# Patient Record
Sex: MALE | Employment: OTHER | URBAN - METROPOLITAN AREA
[De-identification: names, ages, dates, MRNs, and addresses within clinical notes are randomized per-mention and may not be internally consistent; named-entity substitution may affect disease eponyms.]

---

## 2017-01-26 RX ORDER — PANTOPRAZOLE SODIUM 40 MG/1
40 TABLET, DELAYED RELEASE ORAL DAILY
Qty: 30 TAB | Refills: 5 | Status: SHIPPED | OUTPATIENT
Start: 2017-01-26 | End: 2017-01-27 | Stop reason: SDUPTHER

## 2017-01-26 NOTE — TELEPHONE ENCOUNTER
Requested Prescriptions     Pending Prescriptions Disp Refills    pantoprazole (PROTONIX) 40 mg tablet 30 Tab 5     Sig: Take 1 Tab by mouth daily.

## 2017-01-27 RX ORDER — PANTOPRAZOLE SODIUM 40 MG/1
TABLET, DELAYED RELEASE ORAL
Qty: 30 TAB | Refills: 3 | Status: SHIPPED | OUTPATIENT
Start: 2017-01-27 | End: 2017-05-30 | Stop reason: SDUPTHER

## 2017-01-31 RX ORDER — ALPRAZOLAM 0.25 MG/1
TABLET ORAL
Qty: 60 TAB | Refills: 2 | Status: SHIPPED | OUTPATIENT
Start: 2017-01-31 | End: 2017-02-07 | Stop reason: SDUPTHER

## 2017-02-01 RX ORDER — ALPRAZOLAM 0.25 MG/1
TABLET ORAL
Qty: 60 TAB | Refills: 2 | Status: CANCELLED | OUTPATIENT
Start: 2017-02-01

## 2017-02-01 NOTE — TELEPHONE ENCOUNTER
Requested Prescriptions     Pending Prescriptions Disp Refills    ALPRAZolam (XANAX) 0.25 mg tablet 60 Tab 2

## 2017-02-07 RX ORDER — ALPRAZOLAM 0.25 MG/1
TABLET ORAL
Qty: 60 TAB | Refills: 2 | Status: SHIPPED | OUTPATIENT
Start: 2017-02-07 | End: 2017-09-23 | Stop reason: SDUPTHER

## 2017-02-13 RX ORDER — MEMANTINE HYDROCHLORIDE 5 MG/1
TABLET ORAL
Qty: 60 TAB | Refills: 5 | Status: SHIPPED | OUTPATIENT
Start: 2017-02-13 | End: 2017-10-30 | Stop reason: SDUPTHER

## 2017-03-29 RX ORDER — PROPRANOLOL HYDROCHLORIDE 10 MG/1
TABLET ORAL
Qty: 60 TAB | Refills: 5 | Status: SHIPPED | OUTPATIENT
Start: 2017-03-29 | End: 2017-09-24 | Stop reason: SDUPTHER

## 2017-03-29 NOTE — TELEPHONE ENCOUNTER
Requested Prescriptions     Pending Prescriptions Disp Refills    propranolol (INDERAL) 10 mg tablet 60 Tab 5

## 2017-05-30 RX ORDER — PANTOPRAZOLE SODIUM 40 MG/1
TABLET, DELAYED RELEASE ORAL
Qty: 30 TAB | Refills: 3 | Status: SHIPPED | OUTPATIENT
Start: 2017-05-30 | End: 2018-02-26 | Stop reason: SDUPTHER

## 2017-06-26 RX ORDER — RISPERIDONE 0.5 MG/1
TABLET, FILM COATED ORAL
Qty: 30 TAB | Refills: 3 | Status: SHIPPED | OUTPATIENT
Start: 2017-06-26 | End: 2017-12-23 | Stop reason: SDUPTHER

## 2017-09-20 ENCOUNTER — TELEPHONE (OUTPATIENT)
Dept: INTERNAL MEDICINE CLINIC | Age: 82
End: 2017-09-20

## 2017-09-20 NOTE — TELEPHONE ENCOUNTER
Ms Hetal Candelaria contacted office and received authorization information and expressed understanding

## 2017-09-20 NOTE — TELEPHONE ENCOUNTER
Prior Auth request received from Pharmacy for Protonix. PA initiated on [email or web address]. Approval received from insurance. Pharmacy faxed insurance decision. I have attempted to contact this patient by phone with the following results: left message to return my call on answering machine.

## 2017-09-23 RX ORDER — ALPRAZOLAM 0.25 MG/1
TABLET ORAL
Qty: 60 TAB | Refills: 2 | Status: SHIPPED | OUTPATIENT
Start: 2017-09-23 | End: 2018-03-20 | Stop reason: SDUPTHER

## 2017-09-24 RX ORDER — PROPRANOLOL HYDROCHLORIDE 10 MG/1
TABLET ORAL
Qty: 60 TAB | Refills: 5 | Status: SHIPPED | OUTPATIENT
Start: 2017-09-24 | End: 2019-02-27 | Stop reason: SDUPTHER

## 2017-10-17 ENCOUNTER — CLINICAL SUPPORT (OUTPATIENT)
Dept: INTERNAL MEDICINE CLINIC | Age: 82
End: 2017-10-17

## 2017-10-17 DIAGNOSIS — Z23 ENCOUNTER FOR IMMUNIZATION: ICD-10-CM

## 2017-10-17 NOTE — PROGRESS NOTES
Patient presented to office for flu shot. Allergies noted. Patient well and consenting to injection. Vaccine given intramuscular in right deltoid. Patient tolerated injection well and left office ambulatory.

## 2017-10-31 RX ORDER — MEMANTINE HYDROCHLORIDE 5 MG/1
TABLET ORAL
Qty: 60 TAB | Refills: 5 | Status: SHIPPED | OUTPATIENT
Start: 2017-10-31 | End: 2018-05-03 | Stop reason: SDUPTHER

## 2017-12-26 RX ORDER — RISPERIDONE 0.5 MG/1
TABLET, FILM COATED ORAL
Qty: 30 TAB | Refills: 3 | Status: SHIPPED | OUTPATIENT
Start: 2017-12-26 | End: 2018-04-03 | Stop reason: SDUPTHER

## 2018-02-13 RX ORDER — PROPRANOLOL HYDROCHLORIDE 10 MG/1
TABLET ORAL
Qty: 60 TAB | Refills: 5 | Status: SHIPPED | OUTPATIENT
Start: 2018-02-13 | End: 2018-07-27 | Stop reason: SDUPTHER

## 2018-02-13 NOTE — TELEPHONE ENCOUNTER
Requested Prescriptions     Pending Prescriptions Disp Refills    propranolol (INDERAL) 10 mg tablet 60 Tab 5     Sig: take 1 tablet by mouth twice a day

## 2018-02-26 RX ORDER — PANTOPRAZOLE SODIUM 40 MG/1
TABLET, DELAYED RELEASE ORAL
Qty: 30 TAB | Refills: 3 | Status: SHIPPED | OUTPATIENT
Start: 2018-02-26 | End: 2018-06-26 | Stop reason: SDUPTHER

## 2018-03-20 DIAGNOSIS — F41.1 ANXIETY STATES: Primary | ICD-10-CM

## 2018-03-22 RX ORDER — ALPRAZOLAM 0.25 MG/1
TABLET ORAL
Qty: 60 TAB | Refills: 2 | Status: SHIPPED | OUTPATIENT
Start: 2018-03-22 | End: 2018-07-04 | Stop reason: SDUPTHER

## 2018-03-23 ENCOUNTER — TELEPHONE (OUTPATIENT)
Dept: INTERNAL MEDICINE CLINIC | Age: 83
End: 2018-03-23

## 2018-04-03 RX ORDER — RISPERIDONE 0.5 MG/1
TABLET, FILM COATED ORAL
Qty: 30 TAB | Refills: 3 | Status: SHIPPED | OUTPATIENT
Start: 2018-04-03 | End: 2018-08-28 | Stop reason: SDUPTHER

## 2018-05-03 RX ORDER — MEMANTINE HYDROCHLORIDE 5 MG/1
TABLET ORAL
Qty: 60 TAB | Refills: 5 | Status: SHIPPED | OUTPATIENT
Start: 2018-05-03 | End: 2018-11-06 | Stop reason: SDUPTHER

## 2018-06-26 RX ORDER — PANTOPRAZOLE SODIUM 40 MG/1
TABLET, DELAYED RELEASE ORAL
Qty: 30 TAB | Refills: 3 | Status: SHIPPED | OUTPATIENT
Start: 2018-06-26 | End: 2018-11-07 | Stop reason: SDUPTHER

## 2018-07-04 DIAGNOSIS — F41.1 ANXIETY STATES: ICD-10-CM

## 2018-07-05 RX ORDER — ALPRAZOLAM 0.25 MG/1
TABLET ORAL
Qty: 60 TAB | Refills: 2 | Status: SHIPPED | OUTPATIENT
Start: 2018-07-05 | End: 2018-10-10 | Stop reason: SDUPTHER

## 2018-07-27 RX ORDER — PROPRANOLOL HYDROCHLORIDE 10 MG/1
TABLET ORAL
Qty: 60 TAB | Refills: 5 | Status: SHIPPED | OUTPATIENT
Start: 2018-07-27 | End: 2019-08-30 | Stop reason: SDUPTHER

## 2018-08-28 RX ORDER — RISPERIDONE 0.5 MG/1
TABLET, FILM COATED ORAL
Qty: 30 TAB | Refills: 3 | Status: SHIPPED | OUTPATIENT
Start: 2018-08-28 | End: 2019-04-22 | Stop reason: SDUPTHER

## 2018-08-28 RX ORDER — RISPERIDONE 0.5 MG/1
TABLET, FILM COATED ORAL
Qty: 30 TAB | Refills: 3 | Status: SHIPPED | OUTPATIENT
Start: 2018-08-28 | End: 2018-10-03 | Stop reason: SDUPTHER

## 2018-08-28 NOTE — TELEPHONE ENCOUNTER
Requested Prescriptions     Pending Prescriptions Disp Refills    risperiDONE (RISPERDAL) 0.5 mg tablet 30 Tab 3

## 2018-09-16 RX ORDER — PROPRANOLOL HYDROCHLORIDE 10 MG/1
TABLET ORAL
Qty: 60 TAB | Refills: 5 | Status: SHIPPED | OUTPATIENT
Start: 2018-09-16 | End: 2018-10-03 | Stop reason: SDUPTHER

## 2018-10-03 ENCOUNTER — HOSPITAL ENCOUNTER (OUTPATIENT)
Dept: LAB | Age: 83
Discharge: HOME | End: 2018-10-03
Payer: MEDICARE

## 2018-10-03 ENCOUNTER — OFFICE VISIT (OUTPATIENT)
Dept: INTERNAL MEDICINE CLINIC | Age: 83
End: 2018-10-03

## 2018-10-03 ENCOUNTER — HOME HEALTH ADMISSION (OUTPATIENT)
Dept: HOME HEALTH SERVICES | Facility: HOME HEALTH | Age: 83
End: 2018-10-03
Payer: MEDICARE

## 2018-10-03 VITALS
DIASTOLIC BLOOD PRESSURE: 70 MMHG | TEMPERATURE: 98.4 F | OXYGEN SATURATION: 99 % | SYSTOLIC BLOOD PRESSURE: 140 MMHG | HEART RATE: 73 BPM | HEIGHT: 70 IN

## 2018-10-03 DIAGNOSIS — R23.1 PALE: ICD-10-CM

## 2018-10-03 DIAGNOSIS — Z23 ENCOUNTER FOR IMMUNIZATION: ICD-10-CM

## 2018-10-03 DIAGNOSIS — R41.3 MEMORY LOSS: ICD-10-CM

## 2018-10-03 DIAGNOSIS — M15.9 PRIMARY OSTEOARTHRITIS INVOLVING MULTIPLE JOINTS: ICD-10-CM

## 2018-10-03 DIAGNOSIS — G20 PARKINSON DISEASE (HCC): Primary | ICD-10-CM

## 2018-10-03 LAB
ALBUMIN SERPL-MCNC: 3.5 G/DL (ref 3.4–5)
ALBUMIN/GLOB SERPL: 0.8 {RATIO} (ref 0.8–1.7)
ALP SERPL-CCNC: 112 U/L (ref 45–117)
ALT SERPL-CCNC: 19 U/L (ref 16–61)
ANION GAP SERPL CALC-SCNC: 10 MMOL/L (ref 3–18)
AST SERPL-CCNC: 21 U/L (ref 15–37)
BASOPHILS # BLD: 0 K/UL (ref 0–0.1)
BASOPHILS NFR BLD: 1 % (ref 0–2)
BILIRUB SERPL-MCNC: 0.6 MG/DL (ref 0.2–1)
BUN SERPL-MCNC: 27 MG/DL (ref 7–18)
BUN/CREAT SERPL: 22 (ref 12–20)
CALCIUM SERPL-MCNC: 8.4 MG/DL (ref 8.5–10.1)
CHLORIDE SERPL-SCNC: 105 MMOL/L (ref 100–108)
CO2 SERPL-SCNC: 25 MMOL/L (ref 21–32)
CREAT SERPL-MCNC: 1.23 MG/DL (ref 0.6–1.3)
DIFFERENTIAL METHOD BLD: ABNORMAL
EOSINOPHIL # BLD: 0.3 K/UL (ref 0–0.4)
EOSINOPHIL NFR BLD: 5 % (ref 0–5)
ERYTHROCYTE [DISTWIDTH] IN BLOOD BY AUTOMATED COUNT: 14 % (ref 11.6–14.5)
GLOBULIN SER CALC-MCNC: 4.2 G/DL (ref 2–4)
GLUCOSE SERPL-MCNC: 107 MG/DL (ref 74–99)
HCT VFR BLD AUTO: 36.4 % (ref 36–48)
HGB BLD-MCNC: 11.7 G/DL (ref 13–16)
LYMPHOCYTES # BLD: 1.4 K/UL (ref 0.9–3.6)
LYMPHOCYTES NFR BLD: 23 % (ref 21–52)
MCH RBC QN AUTO: 30.2 PG (ref 24–34)
MCHC RBC AUTO-ENTMCNC: 32.1 G/DL (ref 31–37)
MCV RBC AUTO: 94.1 FL (ref 74–97)
MONOCYTES # BLD: 0.5 K/UL (ref 0.05–1.2)
MONOCYTES NFR BLD: 8 % (ref 3–10)
NEUTS SEG # BLD: 3.8 K/UL (ref 1.8–8)
NEUTS SEG NFR BLD: 63 % (ref 40–73)
PLATELET # BLD AUTO: 234 K/UL (ref 135–420)
PMV BLD AUTO: 11.9 FL (ref 9.2–11.8)
POTASSIUM SERPL-SCNC: 4.5 MMOL/L (ref 3.5–5.5)
PROT SERPL-MCNC: 7.7 G/DL (ref 6.4–8.2)
RBC # BLD AUTO: 3.87 M/UL (ref 4.7–5.5)
SODIUM SERPL-SCNC: 140 MMOL/L (ref 136–145)
WBC # BLD AUTO: 5.9 K/UL (ref 4.6–13.2)

## 2018-10-03 PROCEDURE — 36415 COLL VENOUS BLD VENIPUNCTURE: CPT | Performed by: INTERNAL MEDICINE

## 2018-10-03 PROCEDURE — 83921 ORGANIC ACID SINGLE QUANT: CPT | Performed by: INTERNAL MEDICINE

## 2018-10-03 PROCEDURE — 85025 COMPLETE CBC W/AUTO DIFF WBC: CPT | Performed by: INTERNAL MEDICINE

## 2018-10-03 PROCEDURE — 80053 COMPREHEN METABOLIC PANEL: CPT | Performed by: INTERNAL MEDICINE

## 2018-10-03 NOTE — PROGRESS NOTES
Savana Mott presents today for   Chief Complaint   Patient presents with    Well Male       Savana Mott preferred language for health care discussion is english/other. Is someone accompanying this pt? Yes wife    Is the patient using any DME equipment during [address]? Yes wheelchair    Depression Screening:  PHQ over the last two weeks 10/3/2018   Little interest or pleasure in doing things Not at all   Feeling down, depressed, irritable, or hopeless Not at all   Total Score PHQ 2 0       Learning Assessment:  No flowsheet data found. Abuse Screening:  Abuse Screening Questionnaire 10/3/2018   Do you ever feel afraid of your partner? N   Are you in a relationship with someone who physically or mentally threatens you? N   Is it safe for you to go home? Y       Fall Risk  No flowsheet data found. Health Maintenance reviewed and discussed and ordered per Provider. Health Maintenance Due   Topic Date Due    DTaP/Tdap/Td series (1 - Tdap) 09/16/1943    Shingrix Vaccine Age 50> (1 of 2) 09/16/1972    GLAUCOMA SCREENING Q2Y  09/16/1987    MEDICARE YEARLY EXAM  03/14/2018    Influenza Age 9 to Adult  08/01/2018   . Savana Mix is updated on all     Pt currently taking Antiplatelet therapy? no    Coordination of Care:  1. Have you been to the ER, urgent care clinic since your last visit? no Hospitalized since your last visit? no    2. Have you seen or consulted any other health care providers outside of the [address] [place] [address] since your last visit? no Include any pap smears or colon screening. no          Patient presented to office for Influenza 0.5 ml injection. Allergies noted. Patient well and consenting to injection. Injection given intramuscular in left deltoid. Patient tolerated Influenza injection well and left office ambulatory.

## 2018-10-04 NOTE — PROGRESS NOTES
The patient presents to the office today with the chief complaint of weakness    HPI    The patient was brought to the office today by his long time  for a flu shot. Upon arrival she stated that she was concerned about the patient's health and that he needed medical attention. The patient was promptly brought back to an exam room for me to evaluate. The patient has memoy loss but he conversed with me today. His complaint was of weakness and difficulty walking. The patient's  stated that he is unable to ambulate without her assistance. She is elderly and now she finds that it is quite difficult to aid him. He has not showered in several months because she cannot help help to the bath. The patient has been prescribed medications for memory loss and Parkinson's Disease. The patient's  states that the patient is taking some medications but she is unsure as to which medications. The patient continues with stiffness in multiple joints - worse in the hands. Review of Systems   Respiratory: Negative for shortness of breath. Cardiovascular: Negative for chest pain and leg swelling. Musculoskeletal: Positive for joint pain. Neurological: Positive for weakness.        Allergies   Allergen Reactions    Sulfa (Sulfonamide Antibiotics) Other (comments)     Dizziness/ passed out       Current Outpatient Prescriptions   Medication Sig Dispense Refill    risperiDONE (RISPERDAL) 0.5 mg tablet take 1 tablet by mouth every evening 30 Tab 3    propranolol (INDERAL) 10 mg tablet take 1 tablet by mouth twice a day 60 Tab 5    ALPRAZolam (XANAX) 0.25 mg tablet take 1 tablet by mouth twice a day if needed 60 Tab 2    pantoprazole (PROTONIX) 40 mg tablet take 1 tablet by mouth once daily 30 Tab 3    memantine (NAMENDA) 5 mg tablet take 1 tablet by mouth twice a day 60 Tab 5    propranolol (INDERAL) 10 mg tablet take 1 tablet by mouth twice a day 60 Tab 5    pantoprazole (PROTONIX) 40 mg tablet take 1 tablet by mouth once daily 30 Tab 3    traZODone (DESYREL) 100 mg tablet take 1/2 to 1 tablet by mouth at bedtime 30 Tab 0    ascorbic acid (VITAMIN C) 250 mg tablet Take 1 tablet by mouth daily. 30 tablet 0    psyllium husk-aspartame (METAMUCIL FIBER) 3.4 gram pwpk packet Take 1 packet by mouth daily (after dinner). 27 packet 0       Past Medical History:   Diagnosis Date    Allergy     Anxiety states     Arthritis     Benign essential hypertension     Depressive disorder     Diverticulitis     Dizziness and giddiness     Elevated prostate specific antigen (PSA)     Glaucoma     Gross hematuria     Hypertrophy of prostate with urinary obstruction and other lower urinary tract symptoms (LUTS)     Nocturia     Osteoarthrosis involving multiple sites     Tremor        Past Surgical History:   Procedure Laterality Date    HX APPENDECTOMY      HX HERNIA REPAIR      HX OTHER SURGICAL      rectal fissure surgery    HX OTHER SURGICAL Bilateral     eye surgery       Social History     Social History    Marital status: SINGLE     Spouse name: N/A    Number of children: N/A    Years of education: N/A     Occupational History    Not on file. Social History Main Topics    Smoking status: Former Smoker    Smokeless tobacco: Never Used    Alcohol use Yes      Comment: occasional    Drug use: Not on file    Sexual activity: Not on file     Other Topics Concern    Not on file     Social History Narrative       Patient does not have an advanced directive on file    Visit Vitals    /70 (BP 1 Location: Left arm, BP Patient Position: Sitting)    Pulse 73    Temp 98.4 °F (36.9 °C) (Tympanic)    Ht 5' 10\" (1.778 m)    SpO2 99%       Physical Exam   Constitutional:   Patient appears frail, pale and somewhat ill kept   Neck: Carotid bruit is not present. Cardiovascular: Normal rate and regular rhythm. Exam reveals no gallop. No murmur heard.   Pulmonary/Chest: He has no wheezes. He has no rales. Abdominal: Soft. He exhibits no distension. There is no tenderness. Musculoskeletal: He exhibits no edema. Osteoarthritis of both hands and knees   Neurological:   Slight pill rolling tremor of hands  Bradykinesia with cogwheel rigidity   Skin:   Dry and crusting with thickened dirt deposits over his scalp           Hospital Outpatient Visit on 10/03/2018   Component Date Value Ref Range Status    WBC 10/03/2018 5.9  4.6 - 13.2 K/uL Final    RBC 10/03/2018 3.87* 4.70 - 5.50 M/uL Final    HGB 10/03/2018 11.7* 13.0 - 16.0 g/dL Final    HCT 10/03/2018 36.4  36.0 - 48.0 % Final    MCV 10/03/2018 94.1  74.0 - 97.0 FL Final    MCH 10/03/2018 30.2  24.0 - 34.0 PG Final    MCHC 10/03/2018 32.1  31.0 - 37.0 g/dL Final    RDW 10/03/2018 14.0  11.6 - 14.5 % Final    PLATELET 50/32/2568 983  135 - 420 K/uL Final    MPV 10/03/2018 11.9* 9.2 - 11.8 FL Final    NEUTROPHILS 10/03/2018 63  40 - 73 % Final    LYMPHOCYTES 10/03/2018 23  21 - 52 % Final    MONOCYTES 10/03/2018 8  3 - 10 % Final    EOSINOPHILS 10/03/2018 5  0 - 5 % Final    BASOPHILS 10/03/2018 1  0 - 2 % Final    ABS. NEUTROPHILS 10/03/2018 3.8  1.8 - 8.0 K/UL Final    ABS. LYMPHOCYTES 10/03/2018 1.4  0.9 - 3.6 K/UL Final    ABS. MONOCYTES 10/03/2018 0.5  0.05 - 1.2 K/UL Final    ABS. EOSINOPHILS 10/03/2018 0.3  0.0 - 0.4 K/UL Final    ABS.  BASOPHILS 10/03/2018 0.0  0.0 - 0.1 K/UL Final    DF 10/03/2018 AUTOMATED    Final       .  Results for orders placed or performed during the hospital encounter of 10/03/18   CBC WITH AUTOMATED DIFF   Result Value Ref Range    WBC 5.9 4.6 - 13.2 K/uL    RBC 3.87 (L) 4.70 - 5.50 M/uL    HGB 11.7 (L) 13.0 - 16.0 g/dL    HCT 36.4 36.0 - 48.0 %    MCV 94.1 74.0 - 97.0 FL    MCH 30.2 24.0 - 34.0 PG    MCHC 32.1 31.0 - 37.0 g/dL    RDW 14.0 11.6 - 14.5 %    PLATELET 767 085 - 734 K/uL    MPV 11.9 (H) 9.2 - 11.8 FL    NEUTROPHILS 63 40 - 73 %    LYMPHOCYTES 23 21 - 52 %    MONOCYTES 8 3 - 10 %    EOSINOPHILS 5 0 - 5 %    BASOPHILS 1 0 - 2 %    ABS. NEUTROPHILS 3.8 1.8 - 8.0 K/UL    ABS. LYMPHOCYTES 1.4 0.9 - 3.6 K/UL    ABS. MONOCYTES 0.5 0.05 - 1.2 K/UL    ABS. EOSINOPHILS 0.3 0.0 - 0.4 K/UL    ABS. BASOPHILS 0.0 0.0 - 0.1 K/UL    DF AUTOMATED         Assessment / Plan      ICD-10-CM ICD-9-CM    1. Parkinson disease ([facility] [address] G20 332.0 REFERRAL TO HOME HEALTH   2. Primary osteoarthritis involving multiple joints M15.0 715.09 CBC WITH AUTOMATED DIFF      METABOLIC PANEL, COMPREHENSIVE      REFERRAL TO HOME HEALTH   3. Pale R23.1 782.61 CBC WITH AUTOMATED DIFF      REFERRAL TO HOME HEALTH   4. Memory loss R41.3 780.93 METHYLMALONIC ACID      REFERRAL TO HOME HEALTH   5. Encounter for immunization Z23 V03.89 ADMIN INFLUENZA VIRUS VAC      INFLUENZA VACCINE INACTIVATED (IIV), SUBUNIT, ADJUVANTED, IM      REFERRAL TO [address] [place] [address] drawn  Flu shot given  Home Health was contacted for nursing care to aid with med education and compliance, physical therapy, occupational therapy, bath aid, social service  Medications will be reassessed once the home nurse can review with the patient    Follow-up Disposition:  Return in about 6 months (around 4/3/2019). I asked the patient and his  for any questions and I answered their questions.   They state that they understand the treatment plan and agree with the treatment plan

## 2018-10-05 ENCOUNTER — HOME CARE VISIT (OUTPATIENT)
Dept: SCHEDULING | Facility: HOME HEALTH | Age: 83
End: 2018-10-05
Payer: MEDICARE

## 2018-10-05 VITALS
TEMPERATURE: 98.8 F | RESPIRATION RATE: 16 BRPM | DIASTOLIC BLOOD PRESSURE: 76 MMHG | OXYGEN SATURATION: 98 % | SYSTOLIC BLOOD PRESSURE: 159 MMHG | HEART RATE: 58 BPM

## 2018-10-05 PROCEDURE — 400013 HH SOC

## 2018-10-05 PROCEDURE — 3331090001 HH PPS REVENUE CREDIT

## 2018-10-05 PROCEDURE — G0299 HHS/HOSPICE OF RN EA 15 MIN: HCPCS

## 2018-10-05 PROCEDURE — 3331090002 HH PPS REVENUE DEBIT

## 2018-10-06 PROCEDURE — 3331090001 HH PPS REVENUE CREDIT

## 2018-10-06 PROCEDURE — 3331090002 HH PPS REVENUE DEBIT

## 2018-10-07 PROCEDURE — 3331090002 HH PPS REVENUE DEBIT

## 2018-10-07 PROCEDURE — 3331090001 HH PPS REVENUE CREDIT

## 2018-10-08 ENCOUNTER — HOME CARE VISIT (OUTPATIENT)
Dept: HOME HEALTH SERVICES | Facility: HOME HEALTH | Age: 83
End: 2018-10-08
Payer: MEDICARE

## 2018-10-08 LAB
Lab: NORMAL
METHYLMALONATE SERPL-SCNC: 203 NMOL/L (ref 0–378)

## 2018-10-08 PROCEDURE — 3331090002 HH PPS REVENUE DEBIT

## 2018-10-08 PROCEDURE — 3331090001 HH PPS REVENUE CREDIT

## 2018-10-09 ENCOUNTER — HOME CARE VISIT (OUTPATIENT)
Dept: SCHEDULING | Facility: HOME HEALTH | Age: 83
End: 2018-10-09
Payer: MEDICARE

## 2018-10-09 PROCEDURE — 3331090001 HH PPS REVENUE CREDIT

## 2018-10-09 PROCEDURE — G0300 HHS/HOSPICE OF LPN EA 15 MIN: HCPCS

## 2018-10-09 PROCEDURE — 3331090002 HH PPS REVENUE DEBIT

## 2018-10-10 ENCOUNTER — HOME CARE VISIT (OUTPATIENT)
Dept: SCHEDULING | Facility: HOME HEALTH | Age: 83
End: 2018-10-10
Payer: MEDICARE

## 2018-10-10 VITALS
OXYGEN SATURATION: 98 % | TEMPERATURE: 99.3 F | HEART RATE: 66 BPM | DIASTOLIC BLOOD PRESSURE: 82 MMHG | SYSTOLIC BLOOD PRESSURE: 154 MMHG

## 2018-10-10 DIAGNOSIS — F41.1 ANXIETY STATES: ICD-10-CM

## 2018-10-10 PROCEDURE — 3331090002 HH PPS REVENUE DEBIT

## 2018-10-10 PROCEDURE — G0151 HHCP-SERV OF PT,EA 15 MIN: HCPCS

## 2018-10-10 PROCEDURE — 3331090001 HH PPS REVENUE CREDIT

## 2018-10-10 RX ORDER — ALPRAZOLAM 0.25 MG/1
TABLET ORAL
Qty: 60 TAB | Refills: 2 | Status: SHIPPED | OUTPATIENT
Start: 2018-10-10 | End: 2019-02-19 | Stop reason: SDUPTHER

## 2018-10-11 ENCOUNTER — HOME CARE VISIT (OUTPATIENT)
Dept: SCHEDULING | Facility: HOME HEALTH | Age: 83
End: 2018-10-11
Payer: MEDICARE

## 2018-10-11 ENCOUNTER — HOME CARE VISIT (OUTPATIENT)
Dept: HOME HEALTH SERVICES | Facility: HOME HEALTH | Age: 83
End: 2018-10-11
Payer: MEDICARE

## 2018-10-11 PROCEDURE — G0155 HHCP-SVS OF CSW,EA 15 MIN: HCPCS

## 2018-10-11 PROCEDURE — 3331090001 HH PPS REVENUE CREDIT

## 2018-10-11 PROCEDURE — G0300 HHS/HOSPICE OF LPN EA 15 MIN: HCPCS

## 2018-10-11 PROCEDURE — 3331090002 HH PPS REVENUE DEBIT

## 2018-10-12 PROCEDURE — 3331090001 HH PPS REVENUE CREDIT

## 2018-10-12 PROCEDURE — 3331090002 HH PPS REVENUE DEBIT

## 2018-10-13 ENCOUNTER — HOME CARE VISIT (OUTPATIENT)
Dept: SCHEDULING | Facility: HOME HEALTH | Age: 83
End: 2018-10-13
Payer: MEDICARE

## 2018-10-13 VITALS
SYSTOLIC BLOOD PRESSURE: 100 MMHG | HEART RATE: 63 BPM | OXYGEN SATURATION: 98 % | DIASTOLIC BLOOD PRESSURE: 74 MMHG | TEMPERATURE: 97.8 F

## 2018-10-13 PROCEDURE — 3331090002 HH PPS REVENUE DEBIT

## 2018-10-13 PROCEDURE — G0157 HHC PT ASSISTANT EA 15: HCPCS

## 2018-10-13 PROCEDURE — 3331090001 HH PPS REVENUE CREDIT

## 2018-10-14 PROCEDURE — 3331090001 HH PPS REVENUE CREDIT

## 2018-10-14 PROCEDURE — 3331090002 HH PPS REVENUE DEBIT

## 2018-10-15 ENCOUNTER — HOME CARE VISIT (OUTPATIENT)
Dept: HOME HEALTH SERVICES | Facility: HOME HEALTH | Age: 83
End: 2018-10-15
Payer: MEDICARE

## 2018-10-15 ENCOUNTER — HOME CARE VISIT (OUTPATIENT)
Dept: SCHEDULING | Facility: HOME HEALTH | Age: 83
End: 2018-10-15
Payer: MEDICARE

## 2018-10-15 VITALS
DIASTOLIC BLOOD PRESSURE: 65 MMHG | HEART RATE: 63 BPM | RESPIRATION RATE: 16 BRPM | HEART RATE: 67 BPM | SYSTOLIC BLOOD PRESSURE: 142 MMHG | DIASTOLIC BLOOD PRESSURE: 77 MMHG | OXYGEN SATURATION: 98 % | RESPIRATION RATE: 18 BRPM | SYSTOLIC BLOOD PRESSURE: 146 MMHG | OXYGEN SATURATION: 98 %

## 2018-10-15 PROCEDURE — G0157 HHC PT ASSISTANT EA 15: HCPCS

## 2018-10-15 PROCEDURE — 3331090002 HH PPS REVENUE DEBIT

## 2018-10-15 PROCEDURE — 3331090001 HH PPS REVENUE CREDIT

## 2018-10-16 ENCOUNTER — HOME CARE VISIT (OUTPATIENT)
Dept: HOME HEALTH SERVICES | Facility: HOME HEALTH | Age: 83
End: 2018-10-16
Payer: MEDICARE

## 2018-10-16 VITALS
RESPIRATION RATE: 18 BRPM | TEMPERATURE: 97.5 F | OXYGEN SATURATION: 97 % | HEART RATE: 61 BPM | SYSTOLIC BLOOD PRESSURE: 119 MMHG | DIASTOLIC BLOOD PRESSURE: 80 MMHG

## 2018-10-16 PROCEDURE — 3331090001 HH PPS REVENUE CREDIT

## 2018-10-16 PROCEDURE — 3331090002 HH PPS REVENUE DEBIT

## 2018-10-17 ENCOUNTER — HOME CARE VISIT (OUTPATIENT)
Dept: SCHEDULING | Facility: HOME HEALTH | Age: 83
End: 2018-10-17
Payer: MEDICARE

## 2018-10-17 VITALS
RESPIRATION RATE: 18 BRPM | HEART RATE: 74 BPM | TEMPERATURE: 97.7 F | OXYGEN SATURATION: 98 % | SYSTOLIC BLOOD PRESSURE: 119 MMHG | DIASTOLIC BLOOD PRESSURE: 78 MMHG

## 2018-10-17 PROCEDURE — 3331090001 HH PPS REVENUE CREDIT

## 2018-10-17 PROCEDURE — 3331090002 HH PPS REVENUE DEBIT

## 2018-10-17 PROCEDURE — G0157 HHC PT ASSISTANT EA 15: HCPCS

## 2018-10-18 ENCOUNTER — HOME CARE VISIT (OUTPATIENT)
Dept: SCHEDULING | Facility: HOME HEALTH | Age: 83
End: 2018-10-18
Payer: MEDICARE

## 2018-10-18 PROCEDURE — G0157 HHC PT ASSISTANT EA 15: HCPCS

## 2018-10-18 PROCEDURE — 3331090002 HH PPS REVENUE DEBIT

## 2018-10-18 PROCEDURE — 3331090001 HH PPS REVENUE CREDIT

## 2018-10-19 PROCEDURE — 3331090001 HH PPS REVENUE CREDIT

## 2018-10-19 PROCEDURE — 3331090002 HH PPS REVENUE DEBIT

## 2018-10-20 PROCEDURE — 3331090002 HH PPS REVENUE DEBIT

## 2018-10-20 PROCEDURE — 3331090001 HH PPS REVENUE CREDIT

## 2018-10-21 PROCEDURE — 3331090002 HH PPS REVENUE DEBIT

## 2018-10-21 PROCEDURE — 3331090001 HH PPS REVENUE CREDIT

## 2018-10-22 ENCOUNTER — HOME CARE VISIT (OUTPATIENT)
Dept: SCHEDULING | Facility: HOME HEALTH | Age: 83
End: 2018-10-22
Payer: MEDICARE

## 2018-10-22 VITALS
HEART RATE: 60 BPM | DIASTOLIC BLOOD PRESSURE: 60 MMHG | TEMPERATURE: 97.9 F | OXYGEN SATURATION: 98 % | RESPIRATION RATE: 16 BRPM | RESPIRATION RATE: 17 BRPM | HEART RATE: 98 BPM | SYSTOLIC BLOOD PRESSURE: 124 MMHG | OXYGEN SATURATION: 98 % | DIASTOLIC BLOOD PRESSURE: 64 MMHG | SYSTOLIC BLOOD PRESSURE: 116 MMHG | TEMPERATURE: 98.5 F

## 2018-10-22 PROCEDURE — 3331090001 HH PPS REVENUE CREDIT

## 2018-10-22 PROCEDURE — 3331090002 HH PPS REVENUE DEBIT

## 2018-10-22 PROCEDURE — G0157 HHC PT ASSISTANT EA 15: HCPCS

## 2018-10-23 PROCEDURE — 3331090002 HH PPS REVENUE DEBIT

## 2018-10-23 PROCEDURE — 3331090001 HH PPS REVENUE CREDIT

## 2018-10-24 ENCOUNTER — HOME CARE VISIT (OUTPATIENT)
Dept: SCHEDULING | Facility: HOME HEALTH | Age: 83
End: 2018-10-24
Payer: MEDICARE

## 2018-10-24 PROCEDURE — 3331090001 HH PPS REVENUE CREDIT

## 2018-10-24 PROCEDURE — 3331090002 HH PPS REVENUE DEBIT

## 2018-10-24 PROCEDURE — G0151 HHCP-SERV OF PT,EA 15 MIN: HCPCS

## 2018-10-25 VITALS
TEMPERATURE: 99.3 F | HEART RATE: 61 BPM | DIASTOLIC BLOOD PRESSURE: 55 MMHG | SYSTOLIC BLOOD PRESSURE: 147 MMHG | OXYGEN SATURATION: 98 %

## 2018-10-25 PROCEDURE — 3331090002 HH PPS REVENUE DEBIT

## 2018-10-25 PROCEDURE — 3331090001 HH PPS REVENUE CREDIT

## 2018-10-26 ENCOUNTER — HOME CARE VISIT (OUTPATIENT)
Dept: SCHEDULING | Facility: HOME HEALTH | Age: 83
End: 2018-10-26
Payer: MEDICARE

## 2018-10-26 PROCEDURE — 3331090002 HH PPS REVENUE DEBIT

## 2018-10-26 PROCEDURE — 3331090001 HH PPS REVENUE CREDIT

## 2018-10-26 PROCEDURE — G0157 HHC PT ASSISTANT EA 15: HCPCS

## 2018-10-27 PROCEDURE — 3331090002 HH PPS REVENUE DEBIT

## 2018-10-27 PROCEDURE — 3331090001 HH PPS REVENUE CREDIT

## 2018-10-28 VITALS
RESPIRATION RATE: 18 BRPM | TEMPERATURE: 97 F | OXYGEN SATURATION: 97 % | DIASTOLIC BLOOD PRESSURE: 70 MMHG | HEART RATE: 70 BPM | SYSTOLIC BLOOD PRESSURE: 126 MMHG

## 2018-10-28 PROCEDURE — 3331090002 HH PPS REVENUE DEBIT

## 2018-10-28 PROCEDURE — 3331090001 HH PPS REVENUE CREDIT

## 2018-10-29 ENCOUNTER — HOME CARE VISIT (OUTPATIENT)
Dept: SCHEDULING | Facility: HOME HEALTH | Age: 83
End: 2018-10-29
Payer: MEDICARE

## 2018-10-29 PROCEDURE — G0157 HHC PT ASSISTANT EA 15: HCPCS

## 2018-10-29 PROCEDURE — 3331090001 HH PPS REVENUE CREDIT

## 2018-10-29 PROCEDURE — 3331090002 HH PPS REVENUE DEBIT

## 2018-10-30 PROCEDURE — 3331090001 HH PPS REVENUE CREDIT

## 2018-10-30 PROCEDURE — 3331090002 HH PPS REVENUE DEBIT

## 2018-10-31 ENCOUNTER — HOME CARE VISIT (OUTPATIENT)
Dept: SCHEDULING | Facility: HOME HEALTH | Age: 83
End: 2018-10-31
Payer: MEDICARE

## 2018-10-31 VITALS
SYSTOLIC BLOOD PRESSURE: 131 MMHG | OXYGEN SATURATION: 98 % | HEART RATE: 65 BPM | RESPIRATION RATE: 18 BRPM | DIASTOLIC BLOOD PRESSURE: 81 MMHG | TEMPERATURE: 98.1 F

## 2018-10-31 VITALS
DIASTOLIC BLOOD PRESSURE: 75 MMHG | HEART RATE: 67 BPM | RESPIRATION RATE: 17 BRPM | TEMPERATURE: 98.3 F | SYSTOLIC BLOOD PRESSURE: 124 MMHG | OXYGEN SATURATION: 97 %

## 2018-10-31 PROCEDURE — 3331090001 HH PPS REVENUE CREDIT

## 2018-10-31 PROCEDURE — 3331090002 HH PPS REVENUE DEBIT

## 2018-10-31 PROCEDURE — G0157 HHC PT ASSISTANT EA 15: HCPCS

## 2018-11-01 PROCEDURE — 3331090002 HH PPS REVENUE DEBIT

## 2018-11-01 PROCEDURE — 3331090001 HH PPS REVENUE CREDIT

## 2018-11-02 ENCOUNTER — HOME CARE VISIT (OUTPATIENT)
Dept: SCHEDULING | Facility: HOME HEALTH | Age: 83
End: 2018-11-02
Payer: MEDICARE

## 2018-11-02 PROCEDURE — 3331090001 HH PPS REVENUE CREDIT

## 2018-11-02 PROCEDURE — G0157 HHC PT ASSISTANT EA 15: HCPCS

## 2018-11-02 PROCEDURE — 3331090002 HH PPS REVENUE DEBIT

## 2018-11-03 PROCEDURE — 3331090002 HH PPS REVENUE DEBIT

## 2018-11-03 PROCEDURE — 3331090001 HH PPS REVENUE CREDIT

## 2018-11-04 PROCEDURE — 3331090002 HH PPS REVENUE DEBIT

## 2018-11-04 PROCEDURE — 3331090001 HH PPS REVENUE CREDIT

## 2018-11-05 PROCEDURE — 3331090001 HH PPS REVENUE CREDIT

## 2018-11-05 PROCEDURE — 3331090002 HH PPS REVENUE DEBIT

## 2018-11-06 VITALS
OXYGEN SATURATION: 98 % | HEART RATE: 60 BPM | SYSTOLIC BLOOD PRESSURE: 120 MMHG | RESPIRATION RATE: 19 BRPM | DIASTOLIC BLOOD PRESSURE: 61 MMHG | TEMPERATURE: 97.8 F

## 2018-11-06 PROCEDURE — 3331090001 HH PPS REVENUE CREDIT

## 2018-11-06 PROCEDURE — 3331090002 HH PPS REVENUE DEBIT

## 2018-11-07 ENCOUNTER — HOME CARE VISIT (OUTPATIENT)
Dept: SCHEDULING | Facility: HOME HEALTH | Age: 83
End: 2018-11-07
Payer: MEDICARE

## 2018-11-07 PROCEDURE — G0157 HHC PT ASSISTANT EA 15: HCPCS

## 2018-11-07 PROCEDURE — 3331090002 HH PPS REVENUE DEBIT

## 2018-11-07 PROCEDURE — 3331090001 HH PPS REVENUE CREDIT

## 2018-11-07 RX ORDER — PANTOPRAZOLE SODIUM 40 MG/1
TABLET, DELAYED RELEASE ORAL
Qty: 30 TAB | Refills: 3 | Status: SHIPPED | OUTPATIENT
Start: 2018-11-07 | End: 2019-04-11 | Stop reason: SDUPTHER

## 2018-11-07 RX ORDER — MEMANTINE HYDROCHLORIDE 5 MG/1
TABLET ORAL
Qty: 60 TAB | Refills: 5 | Status: SHIPPED | OUTPATIENT
Start: 2018-11-07 | End: 2019-01-23 | Stop reason: SDUPTHER

## 2018-11-07 RX ORDER — PANTOPRAZOLE SODIUM 40 MG/1
TABLET, DELAYED RELEASE ORAL
Qty: 30 TAB | Refills: 3 | Status: SHIPPED | OUTPATIENT
Start: 2018-11-07 | End: 2020-05-15 | Stop reason: SDUPTHER

## 2018-11-07 RX ORDER — MEMANTINE HYDROCHLORIDE 5 MG/1
TABLET ORAL
Qty: 60 TAB | Refills: 5 | Status: SHIPPED | OUTPATIENT
Start: 2018-11-07 | End: 2018-11-07 | Stop reason: SDUPTHER

## 2018-11-07 NOTE — TELEPHONE ENCOUNTER
Requested Prescriptions     Pending Prescriptions Disp Refills    memantine (NAMENDA) 5 mg tablet 60 Tab 5    pantoprazole (PROTONIX) 40 mg tablet 30 Tab 3     Sig: take 1 tablet by mouth once daily

## 2018-11-08 ENCOUNTER — HOME CARE VISIT (OUTPATIENT)
Dept: SCHEDULING | Facility: HOME HEALTH | Age: 83
End: 2018-11-08
Payer: MEDICARE

## 2018-11-08 PROCEDURE — G0151 HHCP-SERV OF PT,EA 15 MIN: HCPCS

## 2018-11-08 PROCEDURE — 3331090002 HH PPS REVENUE DEBIT

## 2018-11-08 PROCEDURE — 3331090001 HH PPS REVENUE CREDIT

## 2018-11-08 PROCEDURE — 3331090003 HH PPS REVENUE ADJ

## 2018-11-09 VITALS
OXYGEN SATURATION: 97 % | RESPIRATION RATE: 19 BRPM | TEMPERATURE: 97.8 F | SYSTOLIC BLOOD PRESSURE: 124 MMHG | HEART RATE: 67 BPM | DIASTOLIC BLOOD PRESSURE: 80 MMHG

## 2018-11-09 VITALS
DIASTOLIC BLOOD PRESSURE: 61 MMHG | OXYGEN SATURATION: 96 % | SYSTOLIC BLOOD PRESSURE: 144 MMHG | TEMPERATURE: 99.2 F | HEART RATE: 57 BPM

## 2018-11-09 PROCEDURE — 3331090002 HH PPS REVENUE DEBIT

## 2018-11-09 PROCEDURE — 3331090001 HH PPS REVENUE CREDIT

## 2018-11-10 PROCEDURE — 3331090001 HH PPS REVENUE CREDIT

## 2018-11-10 PROCEDURE — 3331090002 HH PPS REVENUE DEBIT

## 2018-11-11 PROCEDURE — 3331090002 HH PPS REVENUE DEBIT

## 2018-11-11 PROCEDURE — 3331090001 HH PPS REVENUE CREDIT

## 2019-01-23 RX ORDER — MEMANTINE HYDROCHLORIDE 5 MG/1
TABLET ORAL
Qty: 60 TAB | Refills: 5 | Status: SHIPPED | OUTPATIENT
Start: 2019-01-23 | End: 2021-01-01

## 2019-01-23 NOTE — TELEPHONE ENCOUNTER
Requested Prescriptions     Pending Prescriptions Disp Refills    memantine (NAMENDA) 5 mg tablet 60 Tab 5     Sig: take 1 tablet by mouth twice a day

## 2019-02-19 DIAGNOSIS — F41.1 ANXIETY STATES: ICD-10-CM

## 2019-02-19 RX ORDER — ALPRAZOLAM 0.25 MG/1
TABLET ORAL
Qty: 60 TAB | Refills: 2 | Status: SHIPPED | OUTPATIENT
Start: 2019-02-19 | End: 2019-02-26 | Stop reason: SDUPTHER

## 2019-02-26 ENCOUNTER — TELEPHONE (OUTPATIENT)
Dept: INTERNAL MEDICINE CLINIC | Age: 84
End: 2019-02-26

## 2019-02-26 DIAGNOSIS — F41.1 ANXIETY STATES: ICD-10-CM

## 2019-02-26 RX ORDER — ALPRAZOLAM 0.25 MG/1
TABLET ORAL
Qty: 60 TAB | Refills: 2 | Status: SHIPPED | OUTPATIENT
Start: 2019-02-26 | End: 2019-05-28 | Stop reason: SDUPTHER

## 2019-02-26 NOTE — TELEPHONE ENCOUNTER
[facility] called and stated that the Propranolol 10 mg is on back order. They can do 20 mg and patient can cut in half. Please call in to pharmacy or send in new prescription to [facility].

## 2019-02-27 RX ORDER — PROPRANOLOL HYDROCHLORIDE 20 MG/1
TABLET ORAL
Qty: 30 TAB | Refills: 0 | Status: SHIPPED | OUTPATIENT
Start: 2019-02-27 | End: 2020-05-16 | Stop reason: DRUGHIGH

## 2019-02-27 NOTE — TELEPHONE ENCOUNTER
Prescription called to pharmacy--xanax--  [id number] - [address] [place] [address] Drive, [facility] (Pharmacy) [phone]

## 2019-04-11 RX ORDER — PANTOPRAZOLE SODIUM 40 MG/1
TABLET, DELAYED RELEASE ORAL
Qty: 30 TAB | Refills: 3 | Status: SHIPPED | OUTPATIENT
Start: 2019-04-11 | End: 2019-08-30 | Stop reason: SDUPTHER

## 2019-04-22 RX ORDER — RISPERIDONE 0.5 MG/1
TABLET, FILM COATED ORAL
Qty: 30 TAB | Refills: 3 | Status: SHIPPED | OUTPATIENT
Start: 2019-04-22 | End: 2019-08-30 | Stop reason: SDUPTHER

## 2019-05-28 DIAGNOSIS — F41.1 ANXIETY STATES: ICD-10-CM

## 2019-05-29 RX ORDER — ALPRAZOLAM 0.25 MG/1
TABLET ORAL
Qty: 60 TAB | Refills: 2 | Status: SHIPPED | OUTPATIENT
Start: 2019-05-29 | End: 2019-05-31 | Stop reason: SDUPTHER

## 2019-05-31 DIAGNOSIS — F41.1 ANXIETY STATES: ICD-10-CM

## 2019-05-31 RX ORDER — ALPRAZOLAM 0.25 MG/1
TABLET ORAL
Qty: 60 TAB | Refills: 2 | Status: SHIPPED | OUTPATIENT
Start: 2019-05-31 | End: 2019-06-05 | Stop reason: SDUPTHER

## 2019-06-03 DIAGNOSIS — F41.1 ANXIETY STATES: ICD-10-CM

## 2019-06-05 DIAGNOSIS — F41.1 ANXIETY STATES: ICD-10-CM

## 2019-06-05 RX ORDER — ALPRAZOLAM 0.25 MG/1
TABLET ORAL
Qty: 60 TAB | Refills: 2 | Status: CANCELLED | OUTPATIENT
Start: 2019-06-05

## 2019-06-05 RX ORDER — ALPRAZOLAM 0.25 MG/1
TABLET ORAL
Qty: 60 TAB | Refills: 2 | Status: SHIPPED | OUTPATIENT
Start: 2019-06-05 | End: 2021-01-01

## 2019-08-30 RX ORDER — PANTOPRAZOLE SODIUM 40 MG/1
TABLET, DELAYED RELEASE ORAL
Qty: 30 TAB | Refills: 3 | Status: SHIPPED | OUTPATIENT
Start: 2019-08-30 | End: 2019-09-21 | Stop reason: SDUPTHER

## 2019-08-30 RX ORDER — PROPRANOLOL HYDROCHLORIDE 10 MG/1
TABLET ORAL
Qty: 60 TAB | Refills: 5 | Status: SHIPPED | OUTPATIENT
Start: 2019-08-30 | End: 2020-05-15 | Stop reason: SDUPTHER

## 2019-08-30 RX ORDER — RISPERIDONE 0.5 MG/1
TABLET, FILM COATED ORAL
Qty: 30 TAB | Refills: 3 | Status: SHIPPED | OUTPATIENT
Start: 2019-08-30 | End: 2020-05-16

## 2019-09-22 RX ORDER — PANTOPRAZOLE SODIUM 40 MG/1
TABLET, DELAYED RELEASE ORAL
Qty: 120 TAB | Refills: 0 | Status: SHIPPED | OUTPATIENT
Start: 2019-09-22 | End: 2020-05-16

## 2019-12-30 ENCOUNTER — DOCUMENTATION ONLY (OUTPATIENT)
Dept: FAMILY MEDICINE CLINIC | Facility: CLINIC | Age: 84
End: 2019-12-30

## 2019-12-31 ENCOUNTER — DOCUMENTATION ONLY (OUTPATIENT)
Dept: FAMILY MEDICINE CLINIC | Facility: CLINIC | Age: 84
End: 2019-12-31

## 2019-12-31 NOTE — PROGRESS NOTES
Approval letter for the patients pantoprazole was received and faxed to the [facility] aid and to scan. Good from 11/30/19 to 12/29/2020.

## 2020-05-12 NOTE — PROGRESS NOTES
Consent: Nany Thomson, who was seen by synchronous (real-time) audio-video technology, and/or his healthcare decision maker, is aware that this patient-initiated, Telehealth encounter on 5/13/2020 is a billable service, with coverage as determined by his insurance carrier. He is aware that he may receive a bill and has provided verbal consent to proceed: Yes. The patient was at his residence and I was at the [place] office,and family members participated in the service. The encounter diagnosis was Anxiety states. MDM The patient presents with {DIFF DX:86758} The patient presents with {DIFF DX:63070} {vaavitamincdeaids (Optional):68160} 1. Anxiety states *** ASSESSMENT and PLAN 
{ASSESSMENT/PLAN:75737} Health Maintenance Due Topic Date Due  
 DTaP/Tdap/Td series (1 - Tdap) 09/16/1943  Shingrix Vaccine Age 50> (1 of 2) 09/16/1972  GLAUCOMA SCREENING Q2Y  09/16/1987  Medicare Yearly Exam  03/14/2018 {Time-based coding - will disappear if no selections made (Optional):57143::\"I spent at least 15 minutes with this established patient, and >50% of the time was spent counseling and/or coordinating care regarding ***\":0} 
712 Subjective:  
Nany Thomson is a 80 y.o. male who was seen for *** 
 
Prior to Admission medications Medication Sig Start Date End Date Taking?  Authorizing Provider  
pantoprazole (PROTONIX) 40 mg tablet take 1 tablet by mouth once daily 9/22/19   Adwoa Richards MD  
propranolol (INDERAL) 10 mg tablet take 1 tablet by mouth twice a day 8/30/19   Adwoa Richards MD  
risperiDONE (RISPERDAL) 0.5 mg tablet take 1 tablet by mouth every evening 8/30/19   Adwoa Richards MD  
ALPRAZolam [facility]) 0.25 mg tablet take 1 tablet twice a day if needed for STRESS 6/5/19   Adwoa Richards MD  
propranolol (INDERAL) 20 mg tablet take 1/2 (half) tablet by mouth twice a day 2/27/19   Adwoa Richards MD  
 memantine (NAMENDA) 5 mg tablet take 1 tablet by mouth twice a day 1/23/19   Jes Salgado MD  
pantoprazole (PROTONIX) 40 mg tablet take 1 tablet by mouth once daily 11/7/18   Jes Salgado MD  
ubidecarenone (NATURAL CO-Q10 PO) Take 120 mg by mouth daily. Provider, Historical  
psyllium seed, with dextrose, (FIBER PO) Take 1 Tab by mouth daily. Provider, Historical  
traZODone (DESYREL) 100 mg tablet take 1/2 to 1 tablet by mouth at bedtime 1/3/16   Jes Salgado MD  
ascorbic acid (VITAMIN C) 250 mg tablet Take 1 tablet by mouth daily. 10/2/14   Laura Kelly MD  
psyllium husk-aspartame (METAMUCIL FIBER) 3.4 gram pwpk packet Take 1 packet by mouth daily (after dinner). 10/1/14   Laura Kelly MD  
 
Allergies Allergen Reactions  Sulfa (Sulfonamide Antibiotics) Other (comments) Dizziness/ passed out  
 
has Hypertrophy of prostate with urinary obstruction and other lower urinary tract symptoms (LUTS), Elevated prostate specific antigen (PSA), Tremor, Dizziness and giddiness, Benign essential hypertension, Anxiety states, Osteoarthrosis involving multiple sites, Depressive disorder, and Memory loss on their problem list. 
Past Surgical History:  
Procedure Laterality Date  HX APPENDECTOMY  HX HERNIA REPAIR    
 HX OTHER SURGICAL    
 rectal fissure surgery  HX OTHER SURGICAL Bilateral   
 eye surgery Raissa Billing {History SmartLink choices - disappears if left unselected (Optional):38573} ROS Objective: There were no vitals taken for this visit. General: alert, cooperative, no distress Mental  status: normal mood, behavior, speech, dress, motor activity, and thought processes, able to follow commands HENT: NCAT Neck: no visualized mass Musculoskeletal:   
Resp: no respiratory distress Neuro: no gross deficits Skin: no discoloration or lesions of concern on visible areas Psychiatric: normal affect, consistent with stated mood, no evidence of hallucinations Additional exam findings:  
Results for orders placed or performed during the hospital encounter of 10/03/18 CBC WITH AUTOMATED DIFF Result Value Ref Range WBC 5.9 4.6 - 13.2 K/uL  
 RBC 3.87 (L) 4.70 - 5.50 M/uL  
 HGB 11.7 (L) 13.0 - 16.0 g/dL HCT 36.4 36.0 - 48.0 % MCV 94.1 74.0 - 97.0 FL  
 MCH 30.2 24.0 - 34.0 PG  
 MCHC 32.1 31.0 - 37.0 g/dL  
 RDW 14.0 11.6 - 14.5 % PLATELET 150 692 - 463 K/uL MPV 11.9 (H) 9.2 - 11.8 FL  
 NEUTROPHILS 63 40 - 73 % LYMPHOCYTES 23 21 - 52 % MONOCYTES 8 3 - 10 % EOSINOPHILS 5 0 - 5 % BASOPHILS 1 0 - 2 %  
 ABS. NEUTROPHILS 3.8 1.8 - 8.0 K/UL  
 ABS. LYMPHOCYTES 1.4 0.9 - 3.6 K/UL  
 ABS. MONOCYTES 0.5 0.05 - 1.2 K/UL  
 ABS. EOSINOPHILS 0.3 0.0 - 0.4 K/UL  
 ABS. BASOPHILS 0.0 0.0 - 0.1 K/UL  
 DF AUTOMATED METABOLIC PANEL, COMPREHENSIVE Result Value Ref Range Sodium 140 136 - 145 mmol/L Potassium 4.5 3.5 - 5.5 mmol/L Chloride 105 100 - 108 mmol/L  
 CO2 25 21 - 32 mmol/L Anion gap 10 3.0 - 18 mmol/L Glucose 107 (H) 74 - 99 mg/dL BUN 27 (H) 7.0 - 18 MG/DL Creatinine 1.23 0.6 - 1.3 MG/DL  
 BUN/Creatinine ratio 22 (H) 12 - 20 GFR est AA >60 >60 ml/min/1.73m2 GFR est non-AA 55 (L) >60 ml/min/1.73m2 Calcium 8.4 (L) 8.5 - 10.1 MG/DL Bilirubin, total 0.6 0.2 - 1.0 MG/DL  
 ALT (SGPT) 19 16 - 61 U/L  
 AST (SGOT) 21 15 - 37 U/L Alk. phosphatase 112 45 - 117 U/L Protein, total 7.7 6.4 - 8.2 g/dL Albumin 3.5 3.4 - 5.0 g/dL Globulin 4.2 (H) 2.0 - 4.0 g/dL A-G Ratio 0.8 0.8 - 1.7 METHYLMALONIC ACID Result Value Ref Range Methylmalonic acid 203 0 - 378 nmol/L Disclaimer Comment We discussed the expected course, resolution and complications of the diagnosis(es) in detail. Medication risks, benefits, costs, interactions, and alternatives were discussed as indicated.   I advised him to contact the office if his condition worsens, changes or fails to improve as anticipated. He expressed understanding with the diagnosis(es) and plan. Kamilla Maya is a 80 y.o. male being evaluated by a video visit encounter for concerns as above. A caregiver was present when appropriate. Due to this being a TeleHealth encounter ([facility]), evaluation of the following organ systems was limited: Vitals/Constitutional/EENT/Resp/CV/GI//MS/Neuro/Skin/Heme-Lymph-Imm. Pursuant to the emergency declaration under the [facility], [facility]5 waiver authority and the [employer] Act, this Virtual  Visit was conducted, with patient's (and/or legal guardian's) consent, to reduce the patient's risk of exposure to COVID-19 and provide necessary medical care. Irvin Messina MD 
 
 
This note was done with the assistance of dragon speech software. Some inadvertent errors or omissions may be present

## 2020-05-13 ENCOUNTER — VIRTUAL VISIT (OUTPATIENT)
Dept: FAMILY MEDICINE CLINIC | Facility: CLINIC | Age: 85
End: 2020-05-13

## 2020-05-13 DIAGNOSIS — Z23 ENCOUNTER FOR IMMUNIZATION: ICD-10-CM

## 2020-05-13 DIAGNOSIS — Z13.31 SCREENING FOR DEPRESSION: ICD-10-CM

## 2020-05-13 DIAGNOSIS — Z00.00 MEDICARE ANNUAL WELLNESS VISIT, SUBSEQUENT: Primary | ICD-10-CM

## 2020-05-13 DIAGNOSIS — Z13.39 SCREENING FOR ALCOHOLISM: ICD-10-CM

## 2020-05-13 DIAGNOSIS — F41.1 ANXIETY STATES: ICD-10-CM

## 2020-05-13 RX ORDER — ZOSTER VACCINE RECOMBINANT, ADJUVANTED 50 MCG/0.5
KIT INTRAMUSCULAR
Qty: 0.5 ML | Refills: 1 | Status: CANCELLED | OUTPATIENT
Start: 2020-05-13

## 2020-05-13 NOTE — PROGRESS NOTES
Consent: Kelly Tsai, who was seen by synchronous (real-time) audio-video technology, and/or his healthcare decision maker, is aware that this patient-initiated, Telehealth encounter on 5/13/2020 is a billable service, with coverage as determined by his insurance carrier. He is aware that he may receive a bill and has provided verbal consent to proceed: Yes. The patient was at his residence and I was at [address] [place] [address] participated in the service. ASSESSMENT and PLAN    ICD-10-CM ICD-9-CM    1. Medicare annual wellness visit, subsequent Z00.00 V70.0    2. Screening for alcoholism Z13.39 V79.1 [place] ANNUAL ALCOHOL SCREEN 15 MIN   3. Screening for depression Z13.31 V79.0 DEPRESSION SCREEN ANNUAL   4. Anxiety states F41.1 300.00     This is a chronic problem which is unchanged continue present medications. 5. Encounter for immunization Z23 V03.89 [address]    Patient decided to decline all immunizations. Follow-up and Dispositions    · Return in about 1 year (around 5/13/2021), or if symptoms worsen or fail to improve. Health Maintenance Due   Topic Date Due    DTaP/Tdap/Td series (1 - Tdap) 09/16/1943    Shingrix Vaccine Age 50> (1 of 2) 09/16/1972    GLAUCOMA SCREENING Q2Y  09/16/1987     Declines eye appointment. He is aware of the risks of not being screened for growth,  Declines all immunizations. He does not want me to send a prescription for them to be done. Osmani Barker, age  80, manages his bills. He is unsure but thinks he may have an advance care directive and will ask her when she wakes up. The home health aide states that if it is not present she will notify us and we can send in information about in care advance care directives. The patient has run out of meds and really needs refills.     I spent at least 15 minutes with this established patient, and >50% of the time was spent counseling and/or coordinating care regarding Anxiety, evaluation for dementia, discussing medications and the side effects, evaluate the patient for activities of daily living. We counseled him about the need for vaccination which she subsequently did not declined. 712  Subjective:   Alva Larry is a 80 y.o. male who was seen for establishment of care. When asked what he is being treated for by his prior PCP the patient stated he did not know. He did not admit to any headaches no vision changes, no shortness of breath, or chest pain. There was no history of abdominal pain or bowel movement changes. He moves around his residence with a shuffling gait but also uses a 4 pronged walker periodically. He states that he never leaves the house. He declines any follow-up with any specialist.  He declines any immunization catch-up's. The patient is a non-smoker and nondrinker. He formally smoked. The patient does not remember his prior occupation. He did know where he was but did not know the day or month. He did not know who the president was. The patient states that he is unaware of whether or not he has any power of  or advanced directive. He gives the name of a Ms. Lula Tsai as the person who pays all of his bills. She is apparently about his age, approximately 80years of age. He does not know if he has any written directives. He plans to call her when she wakens and ask her. I offered to send material over to the house by mail and he declines at this time. His previous PCP has retired. He had been going to the patient's home for evaluation and treatment. The last visit the patient had in the office was in November 2018 at that time he was seen for primary osteoarthritis there was generalized, unspecified glaucoma, major depressive disorder, single episode.   He was also seen for essential hypertension, Parkinson's disease, obstructive and reflux uropathy, personal history of nicotine dependence, anxiety disorder, history of amnesia, BPH        Prior to Admission medications    Medication Sig Start Date End Date Taking? Authorizing Provider   pantoprazole (PROTONIX) 40 mg tablet take 1 tablet by mouth once daily 9/22/19   Carlos Alberto Tubbs MD   propranolol (INDERAL) 10 mg tablet take 1 tablet by mouth twice a day 8/30/19   Carlos Alberto Tubbs MD   risperiDONE (RISPERDAL) 0.5 mg tablet take 1 tablet by mouth every evening 8/30/19   Carlos Alberto Tubbs MD   ALPRAZolam [place]) 0.25 mg tablet take 1 tablet twice a day if needed for STRESS 6/5/19   Carlos Alberto Tubbs MD   propranolol (INDERAL) 20 mg tablet take 1/2 (half) tablet by mouth twice a day 2/27/19   Carlos Alberto Tubbs MD   memantine [facility]) 5 mg tablet take 1 tablet by mouth twice a day 1/23/19   Carlos Alberto Tubbs MD   pantoprazole (PROTONIX) 40 mg tablet take 1 tablet by mouth once daily 11/7/18   Carlos Alberto Tubbs MD   ubidecarenone (NATURAL CO-Q10 PO) Take 120 mg by mouth daily. Provider, Historical   psyllium seed, with dextrose, (FIBER PO) Take 1 Tab by mouth daily. Provider, Historical   traZODone (DESYREL) 100 mg tablet take 1/2 to 1 tablet by mouth at bedtime 1/3/16   Carlos Alberto Tubbs MD   ascorbic acid (VITAMIN C) 250 mg tablet Take 1 tablet by mouth daily. 10/2/14   Prashant Esparza MD   psyllium husk-aspartame (METAMUCIL FIBER) 3.4 gram pwpk packet Take 1 packet by mouth daily (after dinner).  10/1/14   Prashant Esparza MD     Allergies   Allergen Reactions    Sulfa (Sulfonamide Antibiotics) Other (comments)     Dizziness/ passed out     has Hypertrophy of prostate with urinary obstruction and other lower urinary tract symptoms (LUTS), Elevated prostate specific antigen (PSA), Tremor, Dizziness and giddiness, Benign essential hypertension, Anxiety states, Osteoarthrosis involving multiple sites, Depressive disorder, and Memory loss on their problem list.  Past Surgical History:   Procedure Laterality Date    HX APPENDECTOMY      HX HERNIA REPAIR      HX OTHER SURGICAL rectal fissure surgery    HX OTHER SURGICAL Bilateral     eye surgery       . Review of Systems   Constitutional: Negative for fever. HENT: Positive for hearing loss (He states that he hears fairly well but has difficulty with an interpretation and understanding. ). Respiratory: Negative. Negative for shortness of breath and wheezing. Cardiovascular: Negative. Gastrointestinal: Negative for constipation, diarrhea and heartburn. Genitourinary: Negative for dysuria. History of BPH    Musculoskeletal: Positive for joint pain (Mild, not significant per patient). Negative for myalgias. Neurological: Negative for dizziness, sensory change, speech change and focal weakness. Psychiatric/Behavioral: Positive for memory loss. Negative for depression. The patient is nervous/anxious. Objective: There were no vitals taken for this visit. General: alert, cooperative, no distress   Mental  status:  Patient is seated on a couch. He has difficulty hearing well. He is thought processes are somewhat slow. The questions have to be repeated. He is cooperative. He is able to follow directions very well. HENT: NCAT. Pupils are equal.   Neck: no visualized mass   Musculoskeletal:  The patient is kyphotic. He is able to arise from a couch without assistance and take a few steps. His gait is broad-based and shuffling.    Resp: no respiratory distress   Neuro: no gross deficits   Skin: no discoloration or lesions of concern on visible areas   Psychiatric: normal affect, consistent with stated mood, no evidence of hallucinations        Results for orders placed or performed during the hospital encounter of 10/03/18   CBC WITH AUTOMATED DIFF   Result Value Ref Range    WBC 5.9 4.6 - 13.2 K/uL    RBC 3.87 (L) 4.70 - 5.50 M/uL    HGB 11.7 (L) 13.0 - 16.0 g/dL    HCT 36.4 36.0 - 48.0 %    MCV 94.1 74.0 - 97.0 FL    MCH 30.2 24.0 - 34.0 PG    MCHC 32.1 31.0 - 37.0 g/dL    RDW 14.0 11.6 - 14.5 % PLATELET 011 006 - 253 K/uL    MPV 11.9 (H) 9.2 - 11.8 FL    NEUTROPHILS 63 40 - 73 %    LYMPHOCYTES 23 21 - 52 %    MONOCYTES 8 3 - 10 %    EOSINOPHILS 5 0 - 5 %    BASOPHILS 1 0 - 2 %    ABS. NEUTROPHILS 3.8 1.8 - 8.0 K/UL    ABS. LYMPHOCYTES 1.4 0.9 - 3.6 K/UL    ABS. MONOCYTES 0.5 0.05 - 1.2 K/UL    ABS. EOSINOPHILS 0.3 0.0 - 0.4 K/UL    ABS. BASOPHILS 0.0 0.0 - 0.1 K/UL    DF AUTOMATED     METABOLIC PANEL, COMPREHENSIVE   Result Value Ref Range    Sodium 140 136 - 145 mmol/L    Potassium 4.5 3.5 - 5.5 mmol/L    Chloride 105 100 - 108 mmol/L    CO2 25 21 - 32 mmol/L    Anion gap 10 3.0 - 18 mmol/L    Glucose 107 (H) 74 - 99 mg/dL    BUN 27 (H) 7.0 - 18 MG/DL    Creatinine 1.23 0.6 - 1.3 MG/DL    BUN/Creatinine ratio 22 (H) 12 - 20      GFR est AA >60 >60 ml/min/1.73m2    GFR est non-AA 55 (L) >60 ml/min/1.73m2    Calcium 8.4 (L) 8.5 - 10.1 MG/DL    Bilirubin, total 0.6 0.2 - 1.0 MG/DL    ALT (SGPT) 19 16 - 61 U/L    AST (SGOT) 21 15 - 37 U/L    Alk. phosphatase 112 45 - 117 U/L    Protein, total 7.7 6.4 - 8.2 g/dL    Albumin 3.5 3.4 - 5.0 g/dL    Globulin 4.2 (H) 2.0 - 4.0 g/dL    A-G Ratio 0.8 0.8 - 1.7     METHYLMALONIC ACID   Result Value Ref Range    Methylmalonic acid 203 0 - 378 nmol/L    Disclaimer Comment           We discussed the expected course, resolution and complications of the diagnosis(es) in detail. Medication risks, benefits, costs, interactions, and alternatives were discussed as indicated. I advised him to contact the office if his condition worsens, changes or fails to improve as anticipated. He expressed understanding with the diagnosis(es) and plan. Severo Gavel is a 80 y.o. male being evaluated by a video visit encounter for concerns as above. A caregiver was present when appropriate.  Due to this being a TeleHealth encounter ([facility]), evaluation of the following organ systems was limited: Vitals/Constitutional/EENT/Resp/CV/GI//MS/Neuro/Skin/Heme-Lymph-Imm. Pursuant to the emergency declaration under the [facility], [facility] waiver authority and the [employer] Act, this Virtual  Visit was conducted, with patient's (and/or legal guardian's) consent, to reduce the patient's risk of exposure to COVID-19 and provide necessary medical care. Gladys Sandoval MD      This note was done with the assistance of dragon speech software.   Some inadvertent errors or omissions may be present

## 2020-05-13 NOTE — PATIENT INSTRUCTIONS
Medicare Wellness Visit, Male The best way to live healthy is to have a lifestyle where you eat a well-balanced diet, exercise regularly, limit alcohol use, and quit all forms of tobacco/nicotine, if applicable. Regular preventive services are another way to keep healthy. Preventive services (vaccines, screening tests, monitoring & exams) can help personalize your care plan, which helps you manage your own care. Screening tests can find health problems at the earliest stages, when they are easiest to treat. Sylwiajose armando follows the current, evidence-based guidelines published by the [facility] ([facility]) when recommending preventive services for our patients. Because we follow these guidelines, sometimes recommendations change over time as research supports it. (For example, a prostate screening blood test is no longer routinely recommended for men with no symptoms). Of course, you and your doctor may decide to screen more often for some diseases, based on your risk and co-morbidities (chronic disease you are already diagnosed with). Preventive services for you include: - Medicare offers their members a free annual wellness visit, which is time for you and your primary care provider to discuss and plan for your preventive service needs. Take advantage of this benefit every year! 
-All adults over age 72 should receive the recommended pneumonia vaccines. Current USPSTF guidelines recommend a series of two vaccines for the best pneumonia protection.  
-All adults should have a flu vaccine yearly and tetanus vaccine every 10 years. 
-All adults age 48 and older should receive the shingles vaccines (series of two vaccines).       
-All adults age 38-68 who are overweight should have a diabetes screening test once every three years.  
-Other screening tests & preventive services for persons with diabetes include: an eye exam to screen for diabetic retinopathy, a kidney function test, a foot exam, and stricter control over your cholesterol.  
-Cardiovascular screening for adults with routine risk involves an electrocardiogram (ECG) at intervals determined by the provider.  
-Colorectal cancer screening should be done for adults age 54-65 with no increased risk factors for colorectal cancer. There are a number of acceptable methods of screening for this type of cancer. Each test has its own benefits and drawbacks. Discuss with your provider what is most appropriate for you during your annual wellness visit. The different tests include: colonoscopy (considered the best screening method), a fecal occult blood test, a fecal DNA test, and sigmoidoscopy. 
-All adults born between [facility] should be screened once for Hepatitis C. 
-An Abdominal Aortic Aneurysm (AAA) Screening is recommended for men age 73-68 who has ever smoked in their lifetime. Here is a list of your current Health Maintenance items (your personalized list of preventive services) with a due date: 
Health Maintenance Due Topic Date Due  
 DTaP/Tdap/Td  (1 - Tdap) 09/16/1943  Shingles Vaccine (1 of 2) 09/16/1972  Glaucoma Screening   09/16/1987 [facility] Annual Well Visit  03/14/2018 Medicare Wellness Visit, Male The best way to live healthy is to have a lifestyle where you eat a well-balanced diet, exercise regularly, limit alcohol use, and quit all forms of tobacco/nicotine, if applicable. Regular preventive services are another way to keep healthy. Preventive services (vaccines, screening tests, monitoring & exams) can help personalize your care plan, which helps you manage your own care. Screening tests can find health problems at the earliest stages, when they are easiest to treat.   
[facility] follows the current, evidence-based guidelines published by the [employer] (USPSTF) when recommending preventive services for our patients. Because we follow these guidelines, sometimes recommendations change over time as research supports it. (For example, a prostate screening blood test is no longer routinely recommended for men with no symptoms). Of course, you and your doctor may decide to screen more often for some diseases, based on your risk and co-morbidities (chronic disease you are already diagnosed with). Preventive services for you include: - Medicare offers their members a free annual wellness visit, which is time for you and your primary care provider to discuss and plan for your preventive service needs. Take advantage of this benefit every year! 
-All adults over age 72 should receive the recommended pneumonia vaccines. Current USPSTF guidelines recommend a series of two vaccines for the best pneumonia protection.  
-All adults should have a flu vaccine yearly and tetanus vaccine every 10 years. 
-All adults age 48 and older should receive the shingles vaccines (series of two vaccines). -All adults age 38-68 who are overweight should have a diabetes screening test once every three years.  
-Other screening tests & preventive services for persons with diabetes include: an eye exam to screen for diabetic retinopathy, a kidney function test, a foot exam, and stricter control over your cholesterol.  
-Cardiovascular screening for adults with routine risk involves an electrocardiogram (ECG) at intervals determined by the provider.  
-Colorectal cancer screening should be done for adults age 54-65 with no increased risk factors for colorectal cancer. There are a number of acceptable methods of screening for this type of cancer. Each test has its own benefits and drawbacks. Discuss with your provider what is most appropriate for you during your annual wellness visit.  The different tests include: colonoscopy (considered the best screening method), a fecal occult blood test, a fecal DNA test, and sigmoidoscopy. 
-All adults born between [facility] should be screened once for Hepatitis C. 
-An Abdominal Aortic Aneurysm (AAA) Screening is recommended for men age 73-68 who has ever smoked in their lifetime. Here is a list of your current Health Maintenance items (your personalized list of preventive services) with a due date: 
Health Maintenance Due Topic Date Due  
 DTaP/Tdap/Td  (1 - Tdap) 09/16/1943  Shingles Vaccine (1 of 2) 09/16/1972  Glaucoma Screening   09/16/1987 [facility] Annual Well Visit  03/14/2018 Prostate Cancer Screening: Care Instructions Your Care Instructions The prostate gland is an organ found just below a man's bladder. It is the size and shape of a walnut. It surrounds the tube that carries urine from the bladder out of the body through the penis. This tube is called the urethra. Prostate cancer is the abnormal growth of cells in the prostate. It is the second most common type of cancer in men. (Skin cancer is the most common.) Most cases of prostate cancer occur in men older than 72. The disease runs in families. And it's more common in -American men. When it's found and treated early, prostate cancer may be cured. But it is not always treated. This is because prostate cancer may not shorten your life, especially if you are older and the cancer is growing slowly. Follow-up care is a key part of your treatment and safety. Be sure to make and go to all appointments, and call your doctor if you are having problems. It's also a good idea to know your test results and keep a list of the medicines you take. What are the screening tests for prostate cancer? The main screening test for prostate cancer is the prostate-specific antigen (PSA) test. This is a blood test that measures how much PSA is in your blood. A high level may mean that you have an enlargement, an infection, or cancer. Along with the PSA test, you may have a digital rectal exam. The digital (finger) rectal exam checks for anything abnormal in your prostate. To do the exam, the doctor puts a lubricated, gloved finger into your rectum. If these tests suggest cancer, you may need a prostate biopsy. How is prostate cancer diagnosed? In a biopsy, the doctor takes small tissue samples from your prostate gland. Another doctor then looks at the tissue under a microscope to see if there are cancer cells, signs of infection, or other problems. The results help diagnose prostate cancer. What are the pros and cons of screening? Neither a PSA test nor a digital rectal exam can tell you for sure that you do or do not have cancer. But they can help you decide if you need more tests, such as a prostate biopsy. Screening tests may be useful because most men with prostate cancer don't have symptoms. It can be hard to know if you have cancer until it is more advanced. And then it's harder to treat. But having a PSA test can also cause harm. The test may show high levels of PSA that aren't caused by cancer. So you could have a prostate biopsy you didn't need. Or the PSA test might be normal when there is cancer, so a cancer might not be found early. The test can also find cancers that would never have caused a problem during your lifetime. So you might have treatment that was not needed. Prostate cancer usually develops late in life and grows slowly. For many men, it does not shorten their lives. Some experts advise screening only for men who are at high risk. Talk with your doctor to see if screening is right for you. Where can you learn more? Go to http://gabriel-duane.info/ Enter R550 in the search box to learn more about \"Prostate Cancer Screening: Care Instructions. \" Current as of: August 21, 2019Content Version: 12.4 © 5924-9726 Healthwise, Incorporated. Care instructions adapted under license by [employer] (which disclaims liability or warranty for this information). If you have questions about a medical condition or this instruction, always ask your healthcare professional. [address] any warranty or liability for your use of this information. Colon Cancer Screening: Care Instructions Your Care Instructions Colorectal cancer occurs in the colon or rectum. That's the lower part of your digestive system. It is the second-leading cause of cancer deaths in the [place]. It often starts with small growths called polyps in the colon or rectum. Polyps are usually found with screening tests. Depending on the type of test, any polyps found may be removed during the tests. Colorectal cancer usually does not cause symptoms at first. But regular tests can help find it early, before it spreads and becomes harder to treat. Experts advise routine tests for colon cancer for people starting at age 48. And they advise people with a higher risk of colon cancer to get tested sooner. Talk with your doctor about when you should start testing. Discuss which tests you need. Follow-up care is a key part of your treatment and safety. Be sure to make and go to all appointments, and call your doctor if you are having problems. It's also a good idea to know your test results and keep a list of the medicines you take. What are the main screening tests for colon cancer? · Stool tests. These include the fecal immunochemical test (FIT) and the fecal occult blood test (FOBT). These tests check stool samples for signs of cancer. If your test is positive, you will need to have a colonoscopy. · Sigmoidoscopy. This test lets your doctor look at the lining of your rectum and the lowest part of your colon. Your doctor uses a lighted tube called a sigmoidoscope.  This test can't find cancers or polyps in the upper part of your colon. In some cases, polyps that are found can be removed. But if your doctor finds polyps, you will need to have a colonoscopy to check the upper part of your colon. · Colonoscopy. This test lets your doctor look at the lining of your rectum and your entire colon. The doctor uses a thin, flexible tool called a colonoscope. It can also be used to remove polyps or get a tissue sample (biopsy). What tests do you need? The following guidelines are for people age 48 and over who are not at high risk for colorectal cancer. You may have at least one of these tests as directed by your doctor. · Fecal immunochemical test (FIT) or fecal occult blood test (FOBT) every year · Sigmoidoscopy every 5 years · Colonoscopy every 10 years If you are age 68 to 80, you can work with your doctor to decide if screening is a good option. If you are age 80 or older, your doctor will likely advise that screening is not helpful. Talk with your doctor about when you need to be tested. And discuss which tests are right for you. Your doctor may recommend earlier or more frequent testing if you: 
· Have had colorectal cancer before. · Have had colon polyps. · Have symptoms of colorectal cancer. These include blood in your stool and changes in your bowel habits. · Have a parent, brother or sister, or child with colon polyps or colorectal cancer. · Have a bowel disease. This includes ulcerative colitis and Crohn's disease. · Have a rare polyp syndrome that runs in families, such as familial adenomatous polyposis (FAP). · Have had radiation treatments to the belly or pelvis. When should you call for help? Watch closely for changes in your health, and be sure to contact your doctor if: 
  · You have any changes in your bowel habits.  
  · You have any problems. Where can you learn more? Go to http://gabriel-duane.info/. Enter M541 in the search box to learn more about \"Colon Cancer Screening: Care Instructions. \" Current as of: March 28, 2018 Content Version: 11.8 © 1573-8938 [employer]. Care instructions adapted under license by [employer] (which disclaims liability or warranty for this information). If you have questions about a medical condition or this instruction, always ask your healthcare professional. [place] [address] any warranty or liability for your use of this information. Advance Care Planning: Care Instructions Your Care Instructions It can be hard to live with an illness that cannot be cured. But if your health is getting worse, you may want to make decisions about end-of-life care. Planning for the end of your life does not mean that you are giving up. It is a way to make sure that your wishes are met. Clearly stating your wishes can make it easier for your loved ones. Making plans while you are still able may also ease your mind and make your final days less stressful and more meaningful. Follow-up care is a key part of your treatment and safety. Be sure to make and go to all appointments, and call your doctor if you are having problems. It's also a good idea to know your test results and keep a list of the medicines you take. What can you do to plan for the end of life? · You can bring these issues up with your doctor. You do not need to wait until your doctor starts the conversation. You might start with \"I would not be willing to live with . Catrachito Confer Catrachito Confer Catrachito Confer \" When you complete this sentence it helps your doctor understand your wishes. · Talk openly and honestly with your doctor. This is the best way to understand the decisions you will need to make as your health changes. Know that you can always change your mind. · Ask your doctor about commonly used life-support measures. These include tube feedings, breathing machines, and fluids given through a vein (IV). Understanding these treatments will help you decide whether you want them. · You may choose to have these life-supporting treatments for a limited time. This allows a trial period to see whether they will help you. You may also decide that you want your doctor to take only certain measures to keep you alive. It is important to spell out these conditions so that your doctor and family understand them. · Talk to your doctor about how long you are likely to live. He or she may be able to give you an idea of what usually happens with your specific illness. · Think about preparing papers that state your wishes. This way there will not be any confusion about what you want. You can change your instructions at any time. Which papers should you prepare? Advance directives are legal papers that tell doctors how you want to be cared for at the end of your life. You do not need a  to write these papers. Ask your doctor or your state health department for information on how to write your advance directives. They may have the forms for each of these types of papers. Make sure your doctor has a copy of these on file, and give a copy to a family member or close friend. · Consider a do-not-resuscitate order (DNR). This order asks that no extra treatments be done if your heart stops or you stop breathing. Extra treatments may include cardiopulmonary resuscitation (CPR), electrical shock to restart your heart, or a machine to breathe for you. If you decide to have a DNR order, ask your doctor to explain and write it. Place the order in your home where everyone can easily see it. · Consider a living will. A living will explains your wishes about life support and other treatments at the end of your life if you become unable to speak for yourself. Living khoury tell doctors to use or not use treatments that would keep you alive. You must have one or two witnesses or a notary present when you sign this form. · Consider a durable power of  for health care. This allows you to name a person to make decisions about your care if you are not able to. Most people ask a close friend or family member. Talk to this person about the kinds of treatments you want and those that you do not want. Make sure this person understands your wishes. These legal papers are simple to change. Tell your doctor what you want to change, and ask him or her to make a note in your medical file. Give your family updated copies of the papers. Where can you learn more? Go to http://gabriel-duane.info/. Enter P184 in the search box to learn more about \"Advance Care Planning: Care Instructions. \" Current as of: November 17, 2016 Content Version: 11.3 © 0571-1959 [employer]. Care instructions adapted under license by [employer] (which disclaims liability or warranty for this information). If you have questions about a medical condition or this instruction, always ask your healthcare professional. [place] [address] any warranty or liability for your use of this information. [address] What is a living will? A living will is a legal form you use to write down the kind of care you want at the end of your life. It is used by the health professionals who will treat you if you aren't able to decide for yourself. If you put your wishes in writing, your loved ones and others will know what kind of care you want. They won't need to guess. This can ease your mind and be helpful to others. A living will is not the same as an estate or property will. An estate will explains what you want to happen with your money and property after you die. Is a living will a legal document? A living will is a legal document. Each state has its own laws about living khoury.  If you move to another state, make sure that your living will is legal in the state where you now live. Or you might use a universal form that has been approved by many states. This kind of form can sometimes be completed and stored online. Your electronic copy will then be available wherever you have a connection to the Internet. In most cases, doctors will respect your wishes even if you have a form from a different state. · You don't need an  to complete a living will. But legal advice can be helpful if your state's laws are unclear, your health history is complicated, or your family can't agree on what should be in your living will. · You can change your living will at any time. Some people find that their wishes about end-of-life care change as their health changes. · In addition to making a living will, think about completing a medical power of  form. This form lets you name the person you want to make end-of-life treatment decisions for you (your \"health care agent\") if you're not able to. Many hospitals and nursing homes will give you the forms you need to complete a living will and a medical power of . · Your living will is used only if you can't make or communicate decisions for yourself anymore. If you become able to make decisions again, you can accept or refuse any treatment, no matter what you wrote in your living will. · Your state may offer an online registry. This is a place where you can store your living will online so the doctors and nurses who need to treat you can find it right away. What should you think about when creating a living will? Talk about your end-of-life wishes with your family members and your doctor. Let them know what you want. That way the people making decisions for you won't be surprised by your choices. Think about these questions as you make your living will: · Do you know enough about life support methods that might be used?  If not, talk to your doctor so you know what might be done if you can't breathe on your own, your heart stops, or you're unable to swallow. · What things would you still want to be able to do after you receive life-support methods? Would you want to be able to walk? To speak? To eat on your own? To live without the help of machines? · If you have a choice, where do you want to be cared for? In your home? At a hospital or nursing home? · Do you want certain Uatsdin practices performed if you become very ill? · If you have a choice at the end of your life, where would you prefer to die? At home? In a hospital or nursing home? Somewhere else? · Would you prefer to be buried or cremated? · Do you want your organs to be donated after you die? What should you do with your living will? · Make sure that your family members and your health care agent have copies of your living will. · Give your doctor a copy of your living will to keep in your medical record. If you have more than one doctor, make sure that each one has a copy. · You may want to put a copy of your living will where it can be easily found. Where can you learn more? Go to http://gabriel-duane.info/. Enter R514 in the search box to learn more about \"Learning About Living Perroy. \" Current as of: August 8, 2016 Content Version: 11.3 © 7283-0587 [employer]. Care instructions adapted under license by [employer] (which disclaims liability or warranty for this information). If you have questions about a medical condition or this instruction, always ask your healthcare professional. [place] [address] any warranty or liability for your use of this information. Preventing Falls: Care Instructions Your Care Instructions Getting around your home safely can be a challenge if you have injuries or health problems that make it easy for you to fall.  Loose rugs and furniture in walkways are among the dangers for many older people who have problems walking or who have poor eyesight. People who have conditions such as arthritis, osteoporosis, or dementia also have to be careful not to fall. You can make your home safer with a few simple measures. Follow-up care is a key part of your treatment and safety. Be sure to make and go to all appointments, and call your doctor if you are having problems. It's also a good idea to know your test results and keep a list of the medicines you take. How can you care for yourself at home? Taking care of yourself · You may get dizzy if you do not drink enough water. To prevent dehydration, drink plenty of fluids, enough so that your urine is light yellow or clear like water. Choose water and other caffeine-free clear liquids. If you have kidney, heart, or liver disease and have to limit fluids, talk with your doctor before you increase the amount of fluids you drink. · Exercise regularly to improve your strength, muscle tone, and balance. Walk if you can. Swimming may be a good choice if you cannot walk easily. · Have your vision and hearing checked each year or any time you notice a change. If you have trouble seeing and hearing, you might not be able to avoid objects and could lose your balance. · Know the side effects of the medicines you take. Ask your doctor or pharmacist whether the medicines you take can affect your balance. Sleeping pills or sedatives can affect your balance. · Limit the amount of alcohol you drink. Alcohol can impair your balance and other senses. · Ask your doctor whether calluses or corns on your feet need to be removed. If you wear loose-fitting shoes because of calluses or corns, you can lose your balance and fall. · Talk to your doctor if you have numbness in your feet. Preventing falls at home · Remove raised doorway thresholds, throw rugs, and clutter. Repair loose carpet or raised areas in the floor. · Move furniture and electrical cords to keep them out of walking paths. · Use nonskid floor wax, and wipe up spills right away, especially on ceramic tile floors. · If you use a walker or cane, put rubber tips on it. If you use crutches, clean the bottoms of them regularly with an abrasive pad, such as steel wool. · Keep your house well lit, especially [place], and outside walkways. Use night-lights in areas such as hallways and bathrooms. Add extra light switches or use remote switches (such as switches that go on or off when you clap your hands) to make it easier to turn lights on if you have to get up during the night. · Install sturdy handrails on stairways. · Move items in your cabinets so that the things you use a lot are on the lower shelves (about waist level). · Keep a cordless phone and a flashlight with new batteries by your bed. If possible, put a phone in each of the main rooms of your house, or carry a cell phone in case you fall and cannot reach a phone. Or, you can wear a device around your neck or wrist. You push a button that sends a signal for help. · Wear low-heeled shoes that fit well and give your feet good support. Use footwear with nonskid soles. Check the heels and soles of your shoes for wear. Repair or replace worn heels or soles. · Do not wear socks without shoes on wood floors. · Walk on the grass when the sidewalks are slippery. If you live in an area that gets snow and ice in the winter, sprinkle salt on slippery steps and sidewalks. Preventing falls in the bath · Install grab bars and nonskid mats inside and outside your shower or tub and near the toilet and sinks. · Use shower chairs and bath benches. · Use a hand-held shower head that will allow you to sit while showering.  
· Get into a tub or shower by putting the weaker leg in first. Get out of a tub or shower with your strong side first. 
 · Repair loose toilet seats and consider installing a raised toilet seat to make getting on and off the toilet easier. · Keep your bathroom door unlocked while you are in the shower. Where can you learn more? Go to http://gabriel-duane.info/. Enter [id number] [id number] in the search box to learn more about \"Preventing Falls: Care Instructions. \" Current as of: March 16, 2018 Content Version: 11.8 © 6913-0279 [employer] Cab. Care instructions adapted under license by [email or web address] (which disclaims liability or warranty for this information). If you have questions about a medical condition or this instruction, always ask your healthcare professional. [place] [address] any warranty or liability for your use of this information. Medicare Wellness Visit, Male The best way to live healthy is to have a lifestyle where you eat a well-balanced diet, exercise regularly, limit alcohol use, and quit all forms of tobacco/nicotine, if applicable. Regular preventive services are another way to keep healthy. Preventive services (vaccines, screening tests, monitoring & exams) can help personalize your care plan, which helps you manage your own care. Screening tests can find health problems at the earliest stages, when they are easiest to treat. [facility] follows the current, evidence-based guidelines published by the [place] (USPSTF) when recommending preventive services for our patients. Because we follow these guidelines, sometimes recommendations change over time as research supports it. (For example, a prostate screening blood test is no longer routinely recommended for men with no symptoms). Of course, you and your doctor may decide to screen more often for some diseases, based on your risk and co-morbidities (chronic disease you are already diagnosed with). Preventive services for you include: - Medicare offers their members a free annual wellness visit, which is time for you and your primary care provider to discuss and plan for your preventive service needs. Take advantage of this benefit every year! 
-All adults over age 72 should receive the recommended pneumonia vaccines. Current USPSTF guidelines recommend a series of two vaccines for the best pneumonia protection.  
-All adults should have a flu vaccine yearly and tetanus vaccine every 10 years. 
-All adults age 48 and older should receive the shingles vaccines (series of two vaccines). -All adults age 38-68 who are overweight should have a diabetes screening test once every three years.  
-Other screening tests & preventive services for persons with diabetes include: an eye exam to screen for diabetic retinopathy, a kidney function test, a foot exam, and stricter control over your cholesterol.  
-Cardiovascular screening for adults with routine risk involves an electrocardiogram (ECG) at intervals determined by the provider.  
-Colorectal cancer screening should be done for adults age 54-65 with no increased risk factors for colorectal cancer. There are a number of acceptable methods of screening for this type of cancer. Each test has its own benefits and drawbacks. Discuss with your provider what is most appropriate for you during your annual wellness visit. The different tests include: colonoscopy (considered the best screening method), a fecal occult blood test, a fecal DNA test, and sigmoidoscopy. 
-All adults born between [facility] should be screened once for Hepatitis C. 
-An Abdominal Aortic Aneurysm (AAA) Screening is recommended for men age 73-68 who has ever smoked in their lifetime. Here is a list of your current Health Maintenance items (your personalized list of preventive services) with a due date: 
Health Maintenance Due Topic Date Due  
 DTaP/Tdap/Td  (1 - Tdap) 09[birth year]/1943  Shingles Vaccine (1 of 2) 09/16/1972  Glaucoma Screening   09/16/1987 Advance Care Planning: Care Instructions Your Care Instructions It can be hard to live with an illness that cannot be cured. But if your health is getting worse, you may want to make decisions about end-of-life care. Planning for the end of your life does not mean that you are giving up. It is a way to make sure that your wishes are met. Clearly stating your wishes can make it easier for your loved ones. Making plans while you are still able may also ease your mind and make your final days less stressful and more meaningful. Follow-up care is a key part of your treatment and safety. Be sure to make and go to all appointments, and call your doctor if you are having problems. It's also a good idea to know your test results and keep a list of the medicines you take. What can you do to plan for the end of life? · You can bring these issues up with your doctor. You do not need to wait until your doctor starts the conversation. You might start with \"I would not be willing to live with . [place] Sunny[place] Hilt [place] Hilt \" When you complete this sentence it helps your doctor understand your wishes. · Talk openly and honestly with your doctor. This is the best way to understand the decisions you will need to make as your health changes. Know that you can always change your mind. · Ask your doctor about commonly used life-support measures. These include tube feedings, breathing machines, and fluids given through a vein (IV). Understanding these treatments will help you decide whether you want them. · You may choose to have these life-supporting treatments for a limited time. This allows a trial period to see whether they will help you. You may also decide that you want your doctor to take only certain measures to keep you alive. It is important to spell out these conditions so that your doctor and family understand them. · Talk to your doctor about how long you are likely to live. He or she may be able to give you an idea of what usually happens with your specific illness. · Think about preparing papers that state your wishes. This way there will not be any confusion about what you want. You can change your instructions at any time. Which papers should you prepare? Advance directives are legal papers that tell doctors how you want to be cared for at the end of your life. You do not need a  to write these papers. Ask your doctor or your [facility] department for information on how to write your advance directives. They may have the forms for each of these types of papers. Make sure your doctor has a copy of these on file, and give a copy to a family member or close friend. · Consider a do-not-resuscitate order (DNR). This order asks that no extra treatments be done if your heart stops or you stop breathing. Extra treatments may include cardiopulmonary resuscitation (CPR), electrical shock to restart your heart, or a machine to breathe for you. If you decide to have a DNR order, ask your doctor to explain and write it. Place the order in your home where everyone can easily see it. · Consider a living will. A living will explains your wishes about life support and other treatments at the end of your life if you become unable to speak for yourself. Living khoury tell doctors to use or not use treatments that would keep you alive. You must have one or two witnesses or a notary present when you sign this form. · Consider a durable power of  for health care. This allows you to name a person to make decisions about your care if you are not able to. Most people ask a close friend or family member. Talk to this person about the kinds of treatments you want and those that you do not want. Make sure this person understands your wishes. These legal papers are simple to change.  Tell your doctor what you want to change, and ask him or her to make a note in your medical file. Give your family updated copies of the papers. Where can you learn more? Go to http://gabriel-duane.info/. Enter P184 in the search box to learn more about \"Advance Care Planning: Care Instructions. \" Current as of: November 17, 2016 Content Version: 11.3 © 9335-1403 FlixChip. Care instructions adapted under license by [employer] (which disclaims liability or warranty for this information). If you have questions about a medical condition or this instruction, always ask your healthcare professional. [place] [address] any warranty or liability for your use of this information. Kelly Clint Reyes [address] What is a living will? A living will is a legal form you use to write down the kind of care you want at the end of your life. It is used by the health professionals who will treat you if you aren't able to decide for yourself. If you put your wishes in writing, your loved ones and others will know what kind of care you want. They won't need to guess. This can ease your mind and be helpful to others. A living will is not the same as an estate or property will. An estate will explains what you want to happen with your money and property after you die. Is a living will a legal document? A living will is a legal document. Each state has its own laws about living khoury. If you move to another state, make sure that your living will is legal in the state where you now live. Or you might use a universal form that has been approved by many states. This kind of form can sometimes be completed and stored online. Your electronic copy will then be available wherever you have a connection to the Internet. In most cases, doctors will respect your wishes even if you have a form from a different state. · You don't need an  to complete a living will.  But legal advice can be helpful if your state's laws are unclear, your health history is complicated, or your family can't agree on what should be in your living will. · You can change your living will at any time. Some people find that their wishes about end-of-life care change as their health changes. · In addition to making a living will, think about completing a medical power of  form. This form lets you name the person you want to make end-of-life treatment decisions for you (your \"health care agent\") if you're not able to. Many hospitals and nursing homes will give you the forms you need to complete a living will and a medical power of . · Your living will is used only if you can't make or communicate decisions for yourself anymore. If you become able to make decisions again, you can accept or refuse any treatment, no matter what you wrote in your living will. · Your state may offer an online registry. This is a place where you can store your living will online so the doctors and nurses who need to treat you can find it right away. What should you think about when creating a living will? Talk about your end-of-life wishes with your family members and your doctor. Let them know what you want. That way the people making decisions for you won't be surprised by your choices. Think about these questions as you make your living will: · Do you know enough about life support methods that might be used? If not, talk to your doctor so you know what might be done if you can't breathe on your own, your heart stops, or you're unable to swallow. · What things would you still want to be able to do after you receive life-support methods? Would you want to be able to walk? To speak? To eat on your own? To live without the help of machines? · If you have a choice, where do you want to be cared for? In your home? At a hospital or nursing home? · Do you want certain Shinto practices performed if you become very ill? · If you have a choice at the end of your life, where would you prefer to die? At home? In a hospital or nursing home? Somewhere else? · Would you prefer to be buried or cremated? · Do you want your organs to be donated after you die? What should you do with your living will? · Make sure that your family members and your health care agent have copies of your living will. · Give your doctor a copy of your living will to keep in your medical record. If you have more than one doctor, make sure that each one has a copy. · You may want to put a copy of your living will where it can be easily found. Where can you learn more? Go to http://gabriel-duane.info/. Enter H060 in the search box to learn more about \"Learning About Living Perroy. \" Current as of: August 8, 2016 Content Version: 11.3 © 6883-4191 Literably. Care instructions adapted under license by [employer] (which disclaims liability or warranty for this information). If you have questions about a medical condition or this instruction, always ask your healthcare professional. [place] [address] any warranty or liability for your use of this information. Preventing Falls: Care Instructions Your Care Instructions Getting around your home safely can be a challenge if you have injuries or health problems that make it easy for you to fall. Loose rugs and furniture in walkways are among the dangers for many older people who have problems walking or who have poor eyesight. People who have conditions such as arthritis, osteoporosis, or dementia also have to be careful not to fall. You can make your home safer with a few simple measures. Follow-up care is a key part of your treatment and safety. Be sure to make and go to all appointments, and call your doctor if you are having problems. It's also a good idea to know your test results and keep a list of the medicines you take. How can you care for yourself at home? Taking care of yourself · You may get dizzy if you do not drink enough water. To prevent dehydration, drink plenty of fluids, enough so that your urine is light yellow or clear like water. Choose water and other caffeine-free clear liquids. If you have kidney, heart, or liver disease and have to limit fluids, talk with your doctor before you increase the amount of fluids you drink. · Exercise regularly to improve your strength, muscle tone, and balance. Walk if you can. Swimming may be a good choice if you cannot walk easily. · Have your vision and hearing checked each year or any time you notice a change. If you have trouble seeing and hearing, you might not be able to avoid objects and could lose your balance. · Know the side effects of the medicines you take. Ask your doctor or pharmacist whether the medicines you take can affect your balance. Sleeping pills or sedatives can affect your balance. · Limit the amount of alcohol you drink. Alcohol can impair your balance and other senses. · Ask your doctor whether calluses or corns on your feet need to be removed. If you wear loose-fitting shoes because of calluses or corns, you can lose your balance and fall. · Talk to your doctor if you have numbness in your feet. Preventing falls at home · Remove raised doorway thresholds, throw rugs, and clutter. Repair loose carpet or raised areas in the floor. · Move furniture and electrical cords to keep them out of walking paths. · Use nonskid floor wax, and wipe up spills right away, especially on ceramic tile floors. · If you use a walker or cane, put rubber tips on it. If you use crutches, clean the bottoms of them regularly with an abrasive pad, such as steel wool. · Keep your house well lit, especially [place], and outside walkways. Use night-lights in areas such as hallways and bathrooms.  Add extra light switches or use remote switches (such as switches that go on or off when you clap your hands) to make it easier to turn lights on if you have to get up during the night. · Install sturdy handrails on stairways. · Move items in your cabinets so that the things you use a lot are on the lower shelves (about waist level). · Keep a cordless phone and a flashlight with new batteries by your bed. If possible, put a phone in each of the main rooms of your house, or carry a cell phone in case you fall and cannot reach a phone. Or, you can wear a device around your neck or wrist. You push a button that sends a signal for help. · Wear low-heeled shoes that fit well and give your feet good support. Use footwear with nonskid soles. Check the heels and soles of your shoes for wear. Repair or replace worn heels or soles. · Do not wear socks without shoes on wood floors. · Walk on the grass when the sidewalks are slippery. If you live in an area that gets snow and ice in the winter, sprinkle salt on slippery steps and sidewalks. Preventing falls in the bath · Install grab bars and nonskid mats inside and outside your shower or tub and near the toilet and sinks. · Use shower chairs and bath benches. · Use a hand-held shower head that will allow you to sit while showering. · Get into a tub or shower by putting the weaker leg in first. Get out of a tub or shower with your strong side first. 
· Repair loose toilet seats and consider installing a raised toilet seat to make getting on and off the toilet easier. · Keep your bathroom door unlocked while you are in the shower. Where can you learn more? Go to http://gabriel-duane.info/. Enter [id number] in the search box to learn more about \"Preventing Falls: Care Instructions. \" Current as of: March 16, 2018 Content Version: 11.8 © 9438-8136 Healthwise, SKURA.  Care instructions adapted under license by [address] (which disclaims liability or warranty for this information). If you have questions about a medical condition or this instruction, always ask your healthcare professional. [address] any warranty or liability for your use of this information.

## 2020-05-13 NOTE — PROGRESS NOTES
Medicare Annual Wellness Visit Progress Note  Initial () Name: Pedro Pablo Ramirez EHOQRK Date: 2020 Ethnicity: PATIENT REFUSED Race: PATIENT REFUSED  MRN: [id number] Age: 80 y.o. 
: 1922 Sex: Male Pedro Pablo Ramirez is a 80y.o. year old male who presents today for No chief complaint on file. . 
 
Vitals/BMI There were no vitals taken for this visit. Health Risk Assessment How does the patient rate their overall health? {Health Ratin} Diet/Lifestyle How many servings of fruits and vegetable does the patient eat per day? {NUMBERS 2-13:04647} How many servings of fried or high fat foods does the patient eat per day? {NUMBERS 6-80:73931} Does the patient see the dentist regularly? {YES/NO (MULTI-RESPONSE):99567852} How many days a week does the patient exercise? {NUMBERS 5-64:57396} How intense is the exercise? {Physical Activity :54453} Patient reported tobacco use: reports that he has quit smoking. He has never used smokeless tobacco. 
Patient reported alcohol use:  reports current alcohol use. Alcohol Risk Factor Screening: 
During the past 3 months has the patient reported consuming more than 3 drinks containing alcohol? {YES/NO:71369924} Does the patient an average of more than 7 drinks per week? {YES/NO:28693939} Functional Ability and Level of Safety Patients home has rugs in the hallway or poor lighting: {Yes/No Caps:23647} Patients home has grab bars in bathroom or handrails on the stairs: {Yes/No Caps:72114} Patient wears a seat belt while in a motor vehicle: {Yes/No Caps:24196} Activities of Daily Living {ADL:60832::\"Self-care\"}. Patient has difficulties driving a car: {Yes/No Caps:81007} Patient can shop for groceries without help: {Yes/No Caps:99211} Patient prepares own meals: {Yes/No Caps:35917} Patient performs housework without help: {Yes/No Caps:20370} Patient can handle money without help: {Yes/No Caps:92050} Does patient take medicines as prescribed: {Yes/No Caps:01805} Patient has been given information on hazards in their home that may hurt them: {Yes/No Caps:85122} Patient has been given information on keeping track of medications: {Yes/No Caps:35446} Cognitive Function Screening Orientation: {ORIENTATION:63953::\"oriented to time, place, person and situation\"} Mood/Affect: {Exam; neuro mood:64332} Appearance: {APPEARANCE:69303::\"age appropriate\"} Attention/Concentration: {Attention/Concentration :07123} Memory: {GEHEVE:26079693} Intelligence: {Intelligence :31118} Family Member/Caregiver Input: *** Depression Risk Factor Screening 3 most recent PHQ Screens 10/3/2018 Little interest or pleasure in doing things Not at all Feeling down, depressed, irritable, or hopeless Not at all Total Score PHQ 2 0 Depression screening reviewed. Fall Risk Screening No flowsheet data found. Fall screening reviewed. Hearing and Vision Screening No exam data present Abuse Screen Patient reports: {Abuse QYCJGQ:73357::\"CKKMPWM is not abused\"} Patient Care Team  
Patient Care Team: 
Adwoa Richards MD as PCP - General (Internal Medicine) History Past Medical History:  
Diagnosis Date  Allergy  Anxiety states  Arthritis  Benign essential hypertension  Depressive disorder  Diverticulitis  Dizziness and giddiness  Elevated prostate specific antigen (PSA)  Glaucoma  Gross hematuria  Hypertrophy of prostate with urinary obstruction and other lower urinary tract symptoms (LUTS)  Nocturia  Osteoarthrosis involving multiple sites  Tremor Past Surgical History:  
Procedure Laterality Date  HX APPENDECTOMY  HX HERNIA REPAIR    
 HX OTHER SURGICAL    
 rectal fissure surgery  HX OTHER SURGICAL Bilateral   
 eye surgery Allergies Allergen Reactions  Sulfa (Sulfonamide Antibiotics) Other (comments) Dizziness/ passed out Family History Problem Relation Age of Onset  No Known Problems Mother  No Known Problems Father  Hypertension Maternal Grandfather  Stroke Maternal Grandfather Social History Tobacco Use  Smoking status: Former Smoker  Smokeless tobacco: Never Used Substance Use Topics  Alcohol use: Yes Comment: occasional  
 
 
Patient Active Problem List  
 Diagnosis Date Noted  Memory loss 11/08/2015  Tremor  Dizziness and giddiness  Benign essential hypertension  Anxiety states  Osteoarthrosis involving multiple sites  Depressive disorder  Hypertrophy of prostate with urinary obstruction and other lower urinary tract symptoms (LUTS)  Elevated prostate specific antigen (PSA) Current Medications Current Outpatient Medications Medication Sig Dispense Refill  pantoprazole (PROTONIX) 40 mg tablet take 1 tablet by mouth once daily 120 Tab 0  propranolol (INDERAL) 10 mg tablet take 1 tablet by mouth twice a day 60 Tab 5  
 risperiDONE (RISPERDAL) 0.5 mg tablet take 1 tablet by mouth every evening 30 Tab 3  ALPRAZolam (XANAX) 0.25 mg tablet take 1 tablet twice a day if needed for STRESS 60 Tab 2  propranolol (INDERAL) 20 mg tablet take 1/2 (half) tablet by mouth twice a day 30 Tab 0  
 memantine (NAMENDA) 5 mg tablet take 1 tablet by mouth twice a day 60 Tab 5  pantoprazole (PROTONIX) 40 mg tablet take 1 tablet by mouth once daily 30 Tab 3  
 ubidecarenone (NATURAL CO-Q10 PO) Take 120 mg by mouth daily.  psyllium seed, with dextrose, (FIBER PO) Take 1 Tab by mouth daily.  traZODone (DESYREL) 100 mg tablet take 1/2 to 1 tablet by mouth at bedtime 30 Tab 0  
 ascorbic acid (VITAMIN C) 250 mg tablet Take 1 tablet by mouth daily. 30 tablet 0  
 psyllium husk-aspartame (METAMUCIL FIBER) 3.4 gram pwpk packet Take 1 packet by mouth daily (after dinner).  30 packet 0  
 
 
 Prevention and Maintenance Last Lipids: 
No results found for: CHOL, CHOLPOCT, CHOLX, CHLST, CHOLV, HDL, HDLPOC, HDLP, LDL, LDLCPOC, LDLC, DLDLP, VLDLC, VLDL, TGLX, TRIGL, TRIGP, TGLPOCT, CHHD, CHHDX Last Glucose: 
Lab Results Component Value Date/Time Glucose 107 (H) 10/03/2018 03:44 PM  
 
 
Last PSA: 
Lab Results Component Value Date/Time  
 Prostate Specific Ag 9.9 (H) 11/25/2011 03:24 PM  
 Prostate Specific Ag 9.79 ng/mL 07/01/2011 Recent Vaccinations/Immunization History Most Recent Immunizations Administered Date(s) Administered  Influenza High Dose Vaccine PF 10/17/2017  Influenza Vaccine (Quad) PF 10/22/2015  Influenza Vaccine (Tri) Adjuvanted 10/03/2018  Pneumococcal Vaccine (Unspecified Type) 12/01/2006 Health Maintenance Topic Date Due  
 DTaP/Tdap/Td series (1 - Tdap) 09/16/1943  Shingrix Vaccine Age 50> (1 of 2) 09/16/1972  GLAUCOMA SCREENING Q2Y  09/16/1987  Medicare Yearly Exam  03/14/2018  Influenza Age 5 to Adult  08/01/2020  Pneumococcal 65+ years  Completed End of Life Planning/Advanced Directives {[facility] AMB END OF LIFE EYLGEMNJ:43735} Advice/Referrals/Counseling {Education List, choose as appropriate:78802::\"Are appropriate based on today's review and evaluation\"} Assessment/Plan  
{Assessment and DHEK:75886} Marily Clinton MD 
5/13/2020  10:18 AM 
This is an Initial Medicare Annual Wellness Exam (AWV) (Performed 12 months after IPPE or effective date of Medicare Part B enrollment, Once in a lifetime) Consent: Princess Kumar, who was seen by synchronous (real-time) {virtual platform audio-video vs audio only:03141::\"audio-video\"} technology, and/or his healthcare decision maker, is aware that this patient-initiated, Telehealth encounter on 5/13/2020 is a billable service.  While AWVs are fully covered by Medicare, any services rendered on this date that are not included in an AWV are subject to additional billing, with coverage as determined by his insurance carrier. He is aware that he may receive a bill for any such additional services and has provided verbal consent to proceed: {YES/NO/NA-Consent obtained within past 12 months:76204}. I have reviewed the patient's medical history in detail and updated the computerized patient record. History Patient Active Problem List  
Diagnosis Code  Hypertrophy of prostate with urinary obstruction and other lower urinary tract symptoms (LUTS) N40.1  Elevated prostate specific antigen (PSA) R97.20  Tremor R25.1  Dizziness and giddiness R42  Benign essential hypertension I10  
 Anxiety states F41.1  Osteoarthrosis involving multiple sites M15.9  Depressive disorder F32.9  Memory loss R41.3 Past Medical History:  
Diagnosis Date  Allergy  Anxiety states  Arthritis  Benign essential hypertension  Depressive disorder  Diverticulitis  Dizziness and giddiness  Elevated prostate specific antigen (PSA)  Glaucoma  Gross hematuria  Hypertrophy of prostate with urinary obstruction and other lower urinary tract symptoms (LUTS)  Nocturia  Osteoarthrosis involving multiple sites  Tremor Past Surgical History:  
Procedure Laterality Date  HX APPENDECTOMY  HX HERNIA REPAIR    
 HX OTHER SURGICAL    
 rectal fissure surgery  HX OTHER SURGICAL Bilateral   
 eye surgery Current Outpatient Medications Medication Sig Dispense Refill  pantoprazole (PROTONIX) 40 mg tablet take 1 tablet by mouth once daily 120 Tab 0  propranolol (INDERAL) 10 mg tablet take 1 tablet by mouth twice a day 60 Tab 5  
 risperiDONE (RISPERDAL) 0.5 mg tablet take 1 tablet by mouth every evening 30 Tab 3  ALPRAZolam (XANAX) 0.25 mg tablet take 1 tablet twice a day if needed for STRESS 60 Tab 2  propranolol (INDERAL) 20 mg tablet take 1/2 (half) tablet by mouth twice a day 30 Tab 0  
  memantine (NAMENDA) 5 mg tablet take 1 tablet by mouth twice a day 60 Tab 5  pantoprazole (PROTONIX) 40 mg tablet take 1 tablet by mouth once daily 30 Tab 3  
 ubidecarenone (NATURAL CO-Q10 PO) Take 120 mg by mouth daily.  psyllium seed, with dextrose, (FIBER PO) Take 1 Tab by mouth daily.  traZODone (DESYREL) 100 mg tablet take 1/2 to 1 tablet by mouth at bedtime 30 Tab 0  
 ascorbic acid (VITAMIN C) 250 mg tablet Take 1 tablet by mouth daily. 30 tablet 0  
 psyllium husk-aspartame (METAMUCIL FIBER) 3.4 gram pwpk packet Take 1 packet by mouth daily (after dinner). 30 packet 0 Allergies Allergen Reactions  Sulfa (Sulfonamide Antibiotics) Other (comments) Dizziness/ passed out Family History Problem Relation Age of Onset  No Known Problems Mother  No Known Problems Father  Hypertension Maternal Grandfather  Stroke Maternal Grandfather Social History Tobacco Use  Smoking status: Former Smoker  Smokeless tobacco: Never Used Substance Use Topics  Alcohol use: Yes Comment: occasional  
 
 
Depression Risk Factor Screening:  
 
3 most recent PHQ Screens 10/3/2018 Little interest or pleasure in doing things Not at all Feeling down, depressed, irritable, or hopeless Not at all Total Score PHQ 2 0 Alcohol Risk Factor Screening (MALE > 65): Do you average more 1 drink per night or more than 7 drinks a week: {Yes/No:566955::\"No\"} In the past three months have you have had more than 4 drinks containing alcohol on one occasion: {Yes/No:450304::\"No\"} Functional Ability and Level of Safety:  
Hearing: {Desc; hearing loss:11823::\"Hearing is good. \"} Activities of Daily Living: The home contains: {AWV Home LAGOQN:16791::\"TI safety equipment. \"} 
{Functional ADL's:73340::\"Patient does total self care\"} Ambulation: {Patient ambulates:79991::\"with no difficulty\"} Fall Risk: 
No flowsheet data found. Abuse Screen: {Abuse CVKOXZ:15419::\"HSCYNDI is not abused\"} Cognitive Screening Has your family/caregiver stated any concerns about your memory: {AWV no/yes:32542::\"no\"} {Cognitive Screenin} Patient Care Team  
Patient Care Team: 
Barbara Gotti MD as PCP - General (Internal Medicine) Assessment/Plan Education and counseling provided: 
{Education List, choose as appropriate:::\"Are appropriate based on today's review and evaluation\"} Diagnoses and all orders for this visit: 
 
1. Anxiety states 2. Screening for alcoholism Other orders 
-     varicella-zoster recombinant, PF, (Shingrix, PF,) 50 mcg/0.5 mL susr injection; 0.5mL by IntraMUSCular route once now and then repeat in 2-6 months 
-     tetanus-diphtheria toxids-td 5-2 Lf unit/0.5 mL injection; 0.5 mL by IntraMUSCular route once for 1 dose. Health Maintenance Due Topic Date Due  
 DTaP/Tdap/Td series (1 - Tdap) 1943  Shingrix Vaccine Age 50> (1 of 2) 1972  GLAUCOMA SCREENING Q2Y  1987  Medicare Yearly Exam  2018 Evert Knapp is a 80 y.o. male who was evaluated by an {virtual platform audio-video vs audio only:01254::\"audio-video\"} encounter for concerns as above. Patient identification was verified prior to start of the visit. A caregiver was present when appropriate. Due to this being a TeleHealth encounter (During [facility]-49 public health emergency), evaluation of the following organ systems was limited: Vitals/Constitutional/EENT/Resp/CV/GI//MS/Neuro/Skin/Heme-Lymph-Imm. Pursuant to the emergency declaration under the [facility], 1135 waiver authority and the [employer] and [employer] Act, this Virtual Visit was conducted, with patient's (and/or legal guardian's) consent, to reduce the patient's risk of exposure to COVID-19 and provide necessary medical care. Services were provided through a synchronous discussion virtually to substitute for in-person clinic visit. I was {location home office other:52052::\"at home\"}. The patient was {location home office other:49326::\"at home\"}.  
 
 
 
Gibran Jerome MD

## 2020-05-14 NOTE — PROGRESS NOTES
This is the Subsequent Medicare Annual Wellness Exam, performed 12 months or more after the Initial AWV or the last Subsequent AWV    Consent: Juliette Bethea, who was seen by synchronous (real-time) audio-video technology, and/or his healthcare decision maker, is aware that this patient-initiated, Telehealth encounter on 5/13/2020 is a billable service. While AWVs are fully covered by Medicare, any services rendered on this date that are not included in an AWV are subject to additional billing, with coverage as determined by his insurance carrier. He is aware that he may receive a bill for any such additional services and has provided verbal consent to proceed: Yes. I have reviewed the patient's medical history in detail and updated the computerized patient record.      History     Patient Active Problem List   Diagnosis Code    Hypertrophy of prostate with urinary obstruction and other lower urinary tract symptoms (LUTS) N40.1    Elevated prostate specific antigen (PSA) R97.20    Tremor R25.1    Dizziness and giddiness R42    Benign essential hypertension I10    Anxiety states F41.1    Osteoarthrosis involving multiple sites M15.9    Depressive disorder F32.9    Memory loss R41.3     Past Medical History:   Diagnosis Date    Allergy     Anxiety states     Arthritis     Benign essential hypertension     Depressive disorder     Diverticulitis     Dizziness and giddiness     Elevated prostate specific antigen (PSA)     Glaucoma     Gross hematuria     Hypertrophy of prostate with urinary obstruction and other lower urinary tract symptoms (LUTS)     Nocturia     Osteoarthrosis involving multiple sites     Tremor       Past Surgical History:   Procedure Laterality Date    HX APPENDECTOMY      HX HERNIA REPAIR      HX OTHER SURGICAL      rectal fissure surgery    HX OTHER SURGICAL Bilateral     eye surgery     Current Outpatient Medications   Medication Sig Dispense Refill    pantoprazole (PROTONIX) 40 mg tablet take 1 tablet by mouth once daily 120 Tab 0    propranolol (INDERAL) 10 mg tablet take 1 tablet by mouth twice a day 60 Tab 5    risperiDONE (RISPERDAL) 0.5 mg tablet take 1 tablet by mouth every evening 30 Tab 3    ALPRAZolam (XANAX) 0.25 mg tablet take 1 tablet twice a day if needed for STRESS 60 Tab 2    propranolol (INDERAL) 20 mg tablet take 1/2 (half) tablet by mouth twice a day 30 Tab 0    memantine (NAMENDA) 5 mg tablet take 1 tablet by mouth twice a day 60 Tab 5    pantoprazole (PROTONIX) 40 mg tablet take 1 tablet by mouth once daily 30 Tab 3    ubidecarenone (NATURAL CO-Q10 PO) Take 120 mg by mouth daily.  psyllium seed, with dextrose, (FIBER PO) Take 1 Tab by mouth daily.  traZODone (DESYREL) 100 mg tablet take 1/2 to 1 tablet by mouth at bedtime 30 Tab 0    ascorbic acid (VITAMIN C) 250 mg tablet Take 1 tablet by mouth daily. 30 tablet 0    psyllium husk-aspartame (METAMUCIL FIBER) 3.4 gram pwpk packet Take 1 packet by mouth daily (after dinner). 30 packet 0     Allergies   Allergen Reactions    Sulfa (Sulfonamide Antibiotics) Other (comments)     Dizziness/ passed out       Family History   Problem Relation Age of Onset    No Known Problems Mother     No Known Problems Father     Hypertension Maternal Grandfather     Stroke Maternal Grandfather      Social History     Tobacco Use    Smoking status: Former Smoker    Smokeless tobacco: Never Used   Substance Use Topics    Alcohol use: Yes     Comment: occasional       Depression Risk Factor Screening:     3 most recent PHQ Screens 5/13/2020   Little interest or pleasure in doing things Not at all   Feeling down, depressed, irritable, or hopeless Not at all   Total Score PHQ 2 0       Alcohol Risk Factor Screening (MALE > 65):    Do you average more 1 drink per night or more than 7 drinks a week: No    In the past three months have you have had more than 4 drinks containing alcohol on one occasion: No      Functional Ability and Level of Safety:   Hearing: Hearing is good. The patient states that his hearing is good, however he has a reception problem and that he does not understand what is spoken at times. Activities of Daily Living: The home contains: no safety equipment. The patient occasionally uses a 4 pronged walker but is usually able to get around the house without it. There is a walk in shower. No other equipment is in the house. Patient does total self care there is a home health aide that assists him and minor problems. He is usually able to take care of himself. Ambulation: with no difficulty and does occasionally use a 4 pronged walker. He has had no recent falls. Fall Risk:  Fall Risk Assessment, last 12 mths 5/13/2020   Able to walk? Yes   Fall in past 12 months? No       Abuse Screen:  Patient is not abused    Cognitive Screening   Has your family/caregiver stated any concerns about your memory: yes -the patient has poor memory or insight as to what his illnesses are. He does not go out of the house. Cognitive Screening: See previous screen. Patient Care Team   Patient Care Team:  Marilee Camejo. Emerson Brooke MD as PCP - General (Internal Medicine)  He declines a sending him for glaucoma checking. Assessment/Plan   Education and counseling provided:  Are appropriate based on today's review and evaluation  The patient knows his name and where he is and the purpose for our visit. He does not know the date or the month. He did get the question about who the president is correctly. He has no memory of what his actual medical problems are or his medications. A significant other helps him with his medications and with paying bills. There is no advanced directive as far as he knows but he is unsure and will ask his significant other when she arises. Diagnoses and all orders for this visit:    1. Medicare annual wellness visit, subsequent    2.  Screening for alcoholism  - [place] ANNUAL ALCOHOL SCREEN 15 MIN    3. Screening for depression  -     DEPRESSION SCREEN ANNUAL    4. Anxiety states  Comments: This is a chronic problem which is unchanged continue present medications. 5. Encounter for immunization  Comments:  Patient decided to decline all immunizations. Orders:  -     [facility] Maintenance Due   Topic Date Due    DTaP/Tdap/Td series (1 - Tdap) 09/16/1943    Shingrix Vaccine Age 50> (1 of 2) 09/16/1972    GLAUCOMA SCREENING Q2Y  09/16/1987       Forest Ugalde is a 80 y.o. male who was evaluated by an audio-video encounter for concerns as above. Patient identification was verified prior to start of the visit. A caregiver was present when appropriate. Due to this being a TeleHealth encounter (During [facility]-98 public health emergency), evaluation of the following organ systems was limited: Vitals/Constitutional/EENT/Resp/CV/GI//MS/Neuro/Skin/Heme-Lymph-Imm. Pursuant to the emergency declaration under the [facility], 1135 waiver authority and the [employer] and [employer] Act, this Virtual Visit was conducted, with patient's (and/or legal guardian's) consent, to reduce the patient's risk of exposure to COVID-19 and provide necessary medical care. Services were provided through a synchronous discussion virtually to substitute for in-person clinic visit. I was at home. The patient was at home.       Tray Baptiste MD

## 2020-05-15 DIAGNOSIS — F41.1 ANXIETY STATES: ICD-10-CM

## 2020-05-15 NOTE — TELEPHONE ENCOUNTER
Last seen 05/13/20  Next appt  None    Requested Prescriptions     Pending Prescriptions Disp Refills    pantoprazole (PROTONIX) 40 mg tablet 30 Tab 3    propranoloL (INDERAL) 10 mg tablet 60 Tab 5    ALPRAZolam (XANAX) 0.25 mg tablet 60 Tab 2     Sig: take 1 tablet twice a day if needed for STRESS

## 2020-05-16 RX ORDER — PANTOPRAZOLE SODIUM 40 MG/1
TABLET, DELAYED RELEASE ORAL
Qty: 90 TAB | Refills: 3 | Status: SHIPPED | OUTPATIENT
Start: 2020-05-16 | End: 2020-05-16 | Stop reason: ALTCHOICE

## 2020-05-16 RX ORDER — PROPRANOLOL HYDROCHLORIDE 10 MG/1
TABLET ORAL
Qty: 180 TAB | Refills: 3 | Status: SHIPPED | OUTPATIENT
Start: 2020-05-16 | End: 2020-05-16 | Stop reason: ALTCHOICE

## 2020-05-16 RX ORDER — PANTOPRAZOLE SODIUM 40 MG/1
TABLET, DELAYED RELEASE ORAL
Qty: 120 TAB | Refills: 0 | Status: SHIPPED | OUTPATIENT
Start: 2020-05-16 | End: 2021-01-01

## 2020-05-16 RX ORDER — ALPRAZOLAM 0.25 MG/1
TABLET ORAL
Qty: 60 TAB | Refills: 2 | OUTPATIENT
Start: 2020-05-16

## 2020-05-16 RX ORDER — PROPRANOLOL HYDROCHLORIDE 10 MG/1
TABLET ORAL
Qty: 180 TAB | Refills: 3 | Status: SHIPPED | OUTPATIENT
Start: 2020-05-16 | End: 2021-01-01

## 2020-05-16 RX ORDER — RISPERIDONE 0.5 MG/1
TABLET, FILM COATED ORAL
Qty: 30 TAB | Refills: 3 | Status: SHIPPED | OUTPATIENT
Start: 2020-05-16 | End: 2020-08-15

## 2020-05-16 NOTE — TELEPHONE ENCOUNTER
The Prescription Monitoring Program registry was checked prior to the issuing of this prescription for a controlled substance  Approved and refill ordered  No components found for: Woodard Krabbe

## 2021-01-01 ENCOUNTER — IMMUNIZATION (OUTPATIENT)
Dept: INTERNAL MEDICINE CLINIC | Age: 86
End: 2021-01-01
Payer: MEDICARE

## 2021-01-01 ENCOUNTER — HOME CARE VISIT (OUTPATIENT)
Dept: SCHEDULING | Facility: HOME HEALTH | Age: 86
End: 2021-01-01
Payer: MEDICARE

## 2021-01-01 ENCOUNTER — HOSPITAL ENCOUNTER (EMERGENCY)
Age: 86
Discharge: HOME | End: 2021-09-11
Attending: STUDENT IN AN ORGANIZED HEALTH CARE EDUCATION/TRAINING PROGRAM
Payer: MEDICARE

## 2021-01-01 ENCOUNTER — DOCUMENTATION ONLY (OUTPATIENT)
Dept: INTERNAL MEDICINE CLINIC | Age: 86
End: 2021-01-01

## 2021-01-01 ENCOUNTER — HOSPICE ADMISSION (OUTPATIENT)
Dept: HOSPICE | Facility: HOSPICE | Age: 86
End: 2021-01-01
Payer: MEDICARE

## 2021-01-01 ENCOUNTER — HOME CARE VISIT (OUTPATIENT)
Dept: HOSPICE | Facility: HOSPICE | Age: 86
End: 2021-01-01
Payer: MEDICARE

## 2021-01-01 ENCOUNTER — HOME HEALTH ADMISSION (OUTPATIENT)
Dept: HOME HEALTH SERVICES | Facility: HOME HEALTH | Age: 86
End: 2021-01-01
Payer: MEDICARE

## 2021-01-01 ENCOUNTER — HOME CARE VISIT (OUTPATIENT)
Dept: HOME HEALTH SERVICES | Facility: HOME HEALTH | Age: 86
End: 2021-01-01
Payer: MEDICARE

## 2021-01-01 ENCOUNTER — DOCUMENTATION ONLY (OUTPATIENT)
Dept: FAMILY MEDICINE CLINIC | Age: 86
End: 2021-01-01

## 2021-01-01 ENCOUNTER — APPOINTMENT (OUTPATIENT)
Dept: CT IMAGING | Age: 86
DRG: 684 | End: 2021-01-01
Attending: STUDENT IN AN ORGANIZED HEALTH CARE EDUCATION/TRAINING PROGRAM
Payer: MEDICARE

## 2021-01-01 ENCOUNTER — APPOINTMENT (OUTPATIENT)
Dept: GENERAL RADIOLOGY | Age: 86
DRG: 699 | End: 2021-01-01
Attending: PHYSICIAN ASSISTANT
Payer: MEDICARE

## 2021-01-01 ENCOUNTER — HOSPITAL ENCOUNTER (INPATIENT)
Age: 86
LOS: 4 days | Discharge: HOME HEALTH | DRG: 699 | End: 2021-09-21
Attending: EMERGENCY MEDICINE | Admitting: INTERNAL MEDICINE
Payer: MEDICARE

## 2021-01-01 ENCOUNTER — VIRTUAL VISIT (OUTPATIENT)
Dept: FAMILY MEDICINE CLINIC | Age: 86
End: 2021-01-01
Payer: MEDICARE

## 2021-01-01 ENCOUNTER — APPOINTMENT (OUTPATIENT)
Dept: GENERAL RADIOLOGY | Age: 86
DRG: 699 | End: 2021-01-01
Attending: EMERGENCY MEDICINE
Payer: MEDICARE

## 2021-01-01 ENCOUNTER — HOSPITAL ENCOUNTER (INPATIENT)
Age: 86
LOS: 3 days | Discharge: HOSPICE | DRG: 699 | End: 2021-09-29
Attending: STUDENT IN AN ORGANIZED HEALTH CARE EDUCATION/TRAINING PROGRAM | Admitting: INTERNAL MEDICINE
Payer: MEDICARE

## 2021-01-01 ENCOUNTER — TELEPHONE (OUTPATIENT)
Dept: FAMILY MEDICINE CLINIC | Age: 86
End: 2021-01-01

## 2021-01-01 ENCOUNTER — APPOINTMENT (OUTPATIENT)
Dept: ULTRASOUND IMAGING | Age: 86
DRG: 684 | End: 2021-01-01
Attending: INTERNAL MEDICINE
Payer: MEDICARE

## 2021-01-01 ENCOUNTER — HOSPITAL ENCOUNTER (INPATIENT)
Age: 86
LOS: 3 days | Discharge: HOME HEALTH | DRG: 684 | End: 2021-08-30
Attending: STUDENT IN AN ORGANIZED HEALTH CARE EDUCATION/TRAINING PROGRAM | Admitting: INTERNAL MEDICINE
Payer: MEDICARE

## 2021-01-01 ENCOUNTER — APPOINTMENT (OUTPATIENT)
Dept: GENERAL RADIOLOGY | Age: 86
End: 2021-01-01
Attending: STUDENT IN AN ORGANIZED HEALTH CARE EDUCATION/TRAINING PROGRAM
Payer: MEDICARE

## 2021-01-01 ENCOUNTER — HOME CARE VISIT (OUTPATIENT)
Dept: HOME HEALTH SERVICES | Facility: HOME HEALTH | Age: 86
End: 2021-01-01

## 2021-01-01 ENCOUNTER — APPOINTMENT (OUTPATIENT)
Dept: GENERAL RADIOLOGY | Age: 86
DRG: 684 | End: 2021-01-01
Attending: STUDENT IN AN ORGANIZED HEALTH CARE EDUCATION/TRAINING PROGRAM
Payer: MEDICARE

## 2021-01-01 VITALS
SYSTOLIC BLOOD PRESSURE: 120 MMHG | RESPIRATION RATE: 14 BRPM | TEMPERATURE: 97.8 F | DIASTOLIC BLOOD PRESSURE: 70 MMHG | OXYGEN SATURATION: 92 % | HEART RATE: 94 BPM

## 2021-01-01 VITALS
TEMPERATURE: 97.7 F | DIASTOLIC BLOOD PRESSURE: 78 MMHG | SYSTOLIC BLOOD PRESSURE: 110 MMHG | OXYGEN SATURATION: 98 % | HEART RATE: 61 BPM | RESPIRATION RATE: 16 BRPM

## 2021-01-01 VITALS
TEMPERATURE: 97.7 F | SYSTOLIC BLOOD PRESSURE: 92 MMHG | OXYGEN SATURATION: 95 % | RESPIRATION RATE: 16 BRPM | HEART RATE: 64 BPM | WEIGHT: 147 LBS | DIASTOLIC BLOOD PRESSURE: 52 MMHG | BODY MASS INDEX: 21.05 KG/M2 | HEIGHT: 70 IN

## 2021-01-01 VITALS
DIASTOLIC BLOOD PRESSURE: 59 MMHG | RESPIRATION RATE: 16 BRPM | OXYGEN SATURATION: 96 % | HEART RATE: 61 BPM | TEMPERATURE: 98.5 F | SYSTOLIC BLOOD PRESSURE: 123 MMHG | BODY MASS INDEX: 20.09 KG/M2 | WEIGHT: 140 LBS

## 2021-01-01 VITALS
HEIGHT: 70 IN | OXYGEN SATURATION: 99 % | BODY MASS INDEX: 20.33 KG/M2 | DIASTOLIC BLOOD PRESSURE: 78 MMHG | RESPIRATION RATE: 19 BRPM | HEART RATE: 73 BPM | SYSTOLIC BLOOD PRESSURE: 152 MMHG | TEMPERATURE: 97.6 F | WEIGHT: 142 LBS

## 2021-01-01 VITALS — RESPIRATION RATE: 18 BRPM | TEMPERATURE: 98.6 F | HEART RATE: 60 BPM

## 2021-01-01 VITALS
RESPIRATION RATE: 15 BRPM | DIASTOLIC BLOOD PRESSURE: 70 MMHG | SYSTOLIC BLOOD PRESSURE: 100 MMHG | HEART RATE: 67 BPM | OXYGEN SATURATION: 94 % | TEMPERATURE: 98.5 F

## 2021-01-01 VITALS
RESPIRATION RATE: 16 BRPM | HEART RATE: 86 BPM | TEMPERATURE: 98.3 F | DIASTOLIC BLOOD PRESSURE: 64 MMHG | SYSTOLIC BLOOD PRESSURE: 106 MMHG | OXYGEN SATURATION: 85 %

## 2021-01-01 VITALS
OXYGEN SATURATION: 94 % | RESPIRATION RATE: 17 BRPM | TEMPERATURE: 99.2 F | HEART RATE: 70 BPM | DIASTOLIC BLOOD PRESSURE: 56 MMHG | SYSTOLIC BLOOD PRESSURE: 110 MMHG

## 2021-01-01 VITALS
DIASTOLIC BLOOD PRESSURE: 71 MMHG | SYSTOLIC BLOOD PRESSURE: 125 MMHG | RESPIRATION RATE: 17 BRPM | HEART RATE: 80 BPM | TEMPERATURE: 98.4 F | OXYGEN SATURATION: 92 %

## 2021-01-01 VITALS
HEART RATE: 68 BPM | TEMPERATURE: 97 F | OXYGEN SATURATION: 88 % | RESPIRATION RATE: 16 BRPM | SYSTOLIC BLOOD PRESSURE: 106 MMHG | DIASTOLIC BLOOD PRESSURE: 64 MMHG

## 2021-01-01 VITALS
HEART RATE: 63 BPM | RESPIRATION RATE: 17 BRPM | DIASTOLIC BLOOD PRESSURE: 65 MMHG | SYSTOLIC BLOOD PRESSURE: 99 MMHG | TEMPERATURE: 98.3 F | OXYGEN SATURATION: 94 %

## 2021-01-01 VITALS
DIASTOLIC BLOOD PRESSURE: 69 MMHG | HEIGHT: 70 IN | SYSTOLIC BLOOD PRESSURE: 124 MMHG | OXYGEN SATURATION: 97 % | HEART RATE: 87 BPM | RESPIRATION RATE: 16 BRPM | WEIGHT: 138.4 LBS | TEMPERATURE: 98.2 F | BODY MASS INDEX: 19.81 KG/M2

## 2021-01-01 VITALS
RESPIRATION RATE: 18 BRPM | DIASTOLIC BLOOD PRESSURE: 60 MMHG | SYSTOLIC BLOOD PRESSURE: 110 MMHG | HEART RATE: 68 BPM | OXYGEN SATURATION: 97 % | TEMPERATURE: 98 F

## 2021-01-01 VITALS
OXYGEN SATURATION: 96 % | SYSTOLIC BLOOD PRESSURE: 100 MMHG | HEART RATE: 60 BPM | TEMPERATURE: 97.3 F | DIASTOLIC BLOOD PRESSURE: 62 MMHG | RESPIRATION RATE: 24 BRPM

## 2021-01-01 VITALS
RESPIRATION RATE: 24 BRPM | HEART RATE: 65 BPM | SYSTOLIC BLOOD PRESSURE: 122 MMHG | TEMPERATURE: 97.3 F | OXYGEN SATURATION: 96 % | DIASTOLIC BLOOD PRESSURE: 62 MMHG

## 2021-01-01 VITALS
SYSTOLIC BLOOD PRESSURE: 123 MMHG | HEART RATE: 63 BPM | RESPIRATION RATE: 18 BRPM | TEMPERATURE: 98.8 F | DIASTOLIC BLOOD PRESSURE: 81 MMHG

## 2021-01-01 VITALS
RESPIRATION RATE: 18 BRPM | HEART RATE: 60 BPM | DIASTOLIC BLOOD PRESSURE: 56 MMHG | OXYGEN SATURATION: 95 % | TEMPERATURE: 98 F | SYSTOLIC BLOOD PRESSURE: 101 MMHG

## 2021-01-01 DIAGNOSIS — N39.0 URINARY TRACT INFECTION WITHOUT HEMATURIA, SITE UNSPECIFIED: Primary | ICD-10-CM

## 2021-01-01 DIAGNOSIS — R41.3 MEMORY LOSS: ICD-10-CM

## 2021-01-01 DIAGNOSIS — N17.9 AKI (ACUTE KIDNEY INJURY) (HCC): Primary | ICD-10-CM

## 2021-01-01 DIAGNOSIS — Z00.00 MEDICARE ANNUAL WELLNESS VISIT, SUBSEQUENT: Primary | ICD-10-CM

## 2021-01-01 DIAGNOSIS — Z51.5 ENCOUNTER FOR PALLIATIVE CARE: ICD-10-CM

## 2021-01-01 DIAGNOSIS — D64.9 ANEMIA, UNSPECIFIED TYPE: ICD-10-CM

## 2021-01-01 DIAGNOSIS — R33.9 URINARY RETENTION: ICD-10-CM

## 2021-01-01 DIAGNOSIS — R53.81 DEBILITY: ICD-10-CM

## 2021-01-01 DIAGNOSIS — R60.9 PERIPHERAL EDEMA: Primary | ICD-10-CM

## 2021-01-01 DIAGNOSIS — Z13.31 SCREENING FOR DEPRESSION: ICD-10-CM

## 2021-01-01 DIAGNOSIS — Z23 ENCOUNTER FOR IMMUNIZATION: Primary | ICD-10-CM

## 2021-01-01 DIAGNOSIS — F41.1 ANXIETY STATES: ICD-10-CM

## 2021-01-01 DIAGNOSIS — G20 PARKINSON'S DISEASE (HCC): ICD-10-CM

## 2021-01-01 DIAGNOSIS — Z71.89 GOALS OF CARE, COUNSELING/DISCUSSION: ICD-10-CM

## 2021-01-01 DIAGNOSIS — R25.1 TREMOR: ICD-10-CM

## 2021-01-01 DIAGNOSIS — Z51.5 HOSPICE CARE: ICD-10-CM

## 2021-01-01 DIAGNOSIS — N17.9 AKI (ACUTE KIDNEY INJURY) (HCC): ICD-10-CM

## 2021-01-01 DIAGNOSIS — N18.32 STAGE 3B CHRONIC KIDNEY DISEASE (HCC): ICD-10-CM

## 2021-01-01 DIAGNOSIS — I10 BENIGN ESSENTIAL HYPERTENSION: Primary | ICD-10-CM

## 2021-01-01 DIAGNOSIS — R19.7 DIARRHEA, UNSPECIFIED TYPE: Primary | ICD-10-CM

## 2021-01-01 DIAGNOSIS — I10 BENIGN ESSENTIAL HYPERTENSION: ICD-10-CM

## 2021-01-01 DIAGNOSIS — E43 SEVERE PROTEIN-CALORIE MALNUTRITION (HCC): ICD-10-CM

## 2021-01-01 DIAGNOSIS — R60.9 PERIPHERAL EDEMA: ICD-10-CM

## 2021-01-01 LAB
ALBUMIN SERPL-MCNC: 2 G/DL (ref 3.4–5)
ALBUMIN SERPL-MCNC: 2 G/DL (ref 3.4–5)
ALBUMIN SERPL-MCNC: 2.2 G/DL (ref 3.4–5)
ALBUMIN SERPL-MCNC: 3.3 G/DL (ref 3.4–5)
ALBUMIN/GLOB SERPL: 0.3 {RATIO} (ref 0.8–1.7)
ALBUMIN/GLOB SERPL: 0.4 {RATIO} (ref 0.8–1.7)
ALBUMIN/GLOB SERPL: 0.4 {RATIO} (ref 0.8–1.7)
ALBUMIN/GLOB SERPL: 0.7 {RATIO} (ref 0.8–1.7)
ALP SERPL-CCNC: 100 U/L (ref 45–117)
ALP SERPL-CCNC: 104 U/L (ref 45–117)
ALP SERPL-CCNC: 79 U/L (ref 45–117)
ALP SERPL-CCNC: 95 U/L (ref 45–117)
ALT SERPL-CCNC: 15 U/L (ref 16–61)
ALT SERPL-CCNC: 34 U/L (ref 16–61)
ALT SERPL-CCNC: 64 U/L (ref 16–61)
ALT SERPL-CCNC: 76 U/L (ref 16–61)
AMORPH CRY URNS QL MICRO: ABNORMAL
ANION GAP SERPL CALC-SCNC: 5 MMOL/L (ref 3–18)
ANION GAP SERPL CALC-SCNC: 6 MMOL/L (ref 3–18)
ANION GAP SERPL CALC-SCNC: 7 MMOL/L (ref 3–18)
ANION GAP SERPL CALC-SCNC: 8 MMOL/L (ref 3–18)
APPEARANCE UR: ABNORMAL
AST SERPL-CCNC: 111 U/L (ref 10–38)
AST SERPL-CCNC: 20 U/L (ref 10–38)
AST SERPL-CCNC: 35 U/L (ref 10–38)
AST SERPL-CCNC: 73 U/L (ref 10–38)
ATRIAL RATE: 69 BPM
ATRIAL RATE: 94 BPM
ATRIAL RATE: 96 BPM
BACTERIA SPEC CULT: ABNORMAL
BACTERIA SPEC CULT: ABNORMAL
BACTERIA SPEC CULT: NORMAL
BACTERIA URNS QL MICRO: ABNORMAL /HPF
BASOPHILS # BLD: 0 K/UL (ref 0–0.1)
BASOPHILS # BLD: 0.1 K/UL (ref 0–0.1)
BASOPHILS # BLD: 0.1 K/UL (ref 0–0.1)
BASOPHILS NFR BLD: 0 % (ref 0–2)
BASOPHILS NFR BLD: 1 % (ref 0–2)
BASOPHILS NFR BLD: 1 % (ref 0–2)
BILIRUB SERPL-MCNC: 0.3 MG/DL (ref 0.2–1)
BILIRUB SERPL-MCNC: 0.4 MG/DL (ref 0.2–1)
BILIRUB SERPL-MCNC: 0.6 MG/DL (ref 0.2–1)
BILIRUB SERPL-MCNC: 0.7 MG/DL (ref 0.2–1)
BILIRUB UR QL: NEGATIVE
BUN SERPL-MCNC: 29 MG/DL (ref 7–18)
BUN SERPL-MCNC: 29 MG/DL (ref 7–18)
BUN SERPL-MCNC: 31 MG/DL (ref 7–18)
BUN SERPL-MCNC: 31 MG/DL (ref 7–18)
BUN SERPL-MCNC: 39 MG/DL (ref 7–18)
BUN SERPL-MCNC: 40 MG/DL (ref 7–18)
BUN SERPL-MCNC: 43 MG/DL (ref 7–18)
BUN SERPL-MCNC: 43 MG/DL (ref 7–18)
BUN SERPL-MCNC: 53 MG/DL (ref 7–18)
BUN SERPL-MCNC: 56 MG/DL (ref 7–18)
BUN SERPL-MCNC: 58 MG/DL (ref 7–18)
BUN/CREAT SERPL: 14 (ref 12–20)
BUN/CREAT SERPL: 14 (ref 12–20)
BUN/CREAT SERPL: 18 (ref 12–20)
BUN/CREAT SERPL: 21 (ref 12–20)
BUN/CREAT SERPL: 22 (ref 12–20)
BUN/CREAT SERPL: 23 (ref 12–20)
BUN/CREAT SERPL: 23 (ref 12–20)
BUN/CREAT SERPL: 24 (ref 12–20)
BUN/CREAT SERPL: 24 (ref 12–20)
BUN/CREAT SERPL: 25 (ref 12–20)
BUN/CREAT SERPL: 25 (ref 12–20)
C DIFF MOLECULAR, CDTTNP: POSITIVE
CALCIUM SERPL-MCNC: 7.3 MG/DL (ref 8.5–10.1)
CALCIUM SERPL-MCNC: 7.4 MG/DL (ref 8.5–10.1)
CALCIUM SERPL-MCNC: 7.4 MG/DL (ref 8.5–10.1)
CALCIUM SERPL-MCNC: 7.6 MG/DL (ref 8.5–10.1)
CALCIUM SERPL-MCNC: 7.6 MG/DL (ref 8.5–10.1)
CALCIUM SERPL-MCNC: 7.7 MG/DL (ref 8.5–10.1)
CALCIUM SERPL-MCNC: 7.7 MG/DL (ref 8.5–10.1)
CALCIUM SERPL-MCNC: 7.8 MG/DL (ref 8.5–10.1)
CALCIUM SERPL-MCNC: 7.8 MG/DL (ref 8.5–10.1)
CALCIUM SERPL-MCNC: 8.3 MG/DL (ref 8.5–10.1)
CALCIUM SERPL-MCNC: 8.4 MG/DL (ref 8.5–10.1)
CALCULATED P AXIS, ECG09: 44 DEGREES
CALCULATED P AXIS, ECG09: 55 DEGREES
CALCULATED P AXIS, ECG09: 65 DEGREES
CALCULATED R AXIS, ECG10: -33 DEGREES
CALCULATED R AXIS, ECG10: -36 DEGREES
CALCULATED R AXIS, ECG10: -46 DEGREES
CALCULATED T AXIS, ECG11: 11 DEGREES
CALCULATED T AXIS, ECG11: 46 DEGREES
CALCULATED T AXIS, ECG11: 81 DEGREES
CC UR VC: ABNORMAL
CHLORIDE SERPL-SCNC: 104 MMOL/L (ref 100–111)
CHLORIDE SERPL-SCNC: 104 MMOL/L (ref 100–111)
CHLORIDE SERPL-SCNC: 105 MMOL/L (ref 100–111)
CHLORIDE SERPL-SCNC: 106 MMOL/L (ref 100–111)
CHLORIDE SERPL-SCNC: 107 MMOL/L (ref 100–111)
CHLORIDE SERPL-SCNC: 108 MMOL/L (ref 100–111)
CHLORIDE SERPL-SCNC: 109 MMOL/L (ref 100–111)
CHLORIDE SERPL-SCNC: 109 MMOL/L (ref 100–111)
CHLORIDE SERPL-SCNC: 114 MMOL/L (ref 100–111)
CK MB CFR SERPL CALC: 1.5 % (ref 0–4)
CK MB SERPL-MCNC: 2 NG/ML (ref 5–25)
CK SERPL-CCNC: 132 U/L (ref 39–308)
CO2 SERPL-SCNC: 22 MMOL/L (ref 21–32)
CO2 SERPL-SCNC: 23 MMOL/L (ref 21–32)
CO2 SERPL-SCNC: 24 MMOL/L (ref 21–32)
CO2 SERPL-SCNC: 24 MMOL/L (ref 21–32)
CO2 SERPL-SCNC: 25 MMOL/L (ref 21–32)
CO2 SERPL-SCNC: 25 MMOL/L (ref 21–32)
CO2 SERPL-SCNC: 26 MMOL/L (ref 21–32)
CO2 SERPL-SCNC: 27 MMOL/L (ref 21–32)
COLOR UR: ABNORMAL
COLOR UR: ABNORMAL
COLOR UR: YELLOW
COVID-19 RAPID TEST, COVR: NOT DETECTED
CREAT SERPL-MCNC: 1.2 MG/DL (ref 0.6–1.3)
CREAT SERPL-MCNC: 1.63 MG/DL (ref 0.6–1.3)
CREAT SERPL-MCNC: 1.65 MG/DL (ref 0.6–1.3)
CREAT SERPL-MCNC: 1.72 MG/DL (ref 0.6–1.3)
CREAT SERPL-MCNC: 1.75 MG/DL (ref 0.6–1.3)
CREAT SERPL-MCNC: 1.84 MG/DL (ref 0.6–1.3)
CREAT SERPL-MCNC: 2.16 MG/DL (ref 0.6–1.3)
CREAT SERPL-MCNC: 2.29 MG/DL (ref 0.6–1.3)
CREAT SERPL-MCNC: 2.32 MG/DL (ref 0.6–1.3)
CREAT SERPL-MCNC: 2.49 MG/DL (ref 0.6–1.3)
CREAT SERPL-MCNC: 2.54 MG/DL (ref 0.6–1.3)
DIAGNOSIS, 93000: NORMAL
DIFFERENTIAL METHOD BLD: ABNORMAL
EOSINOPHIL # BLD: 0.1 K/UL (ref 0–0.4)
EOSINOPHIL # BLD: 0.2 K/UL (ref 0–0.4)
EOSINOPHIL # BLD: 0.4 K/UL (ref 0–0.4)
EOSINOPHIL # BLD: 0.4 K/UL (ref 0–0.4)
EOSINOPHIL NFR BLD: 0 % (ref 0–5)
EOSINOPHIL NFR BLD: 1 % (ref 0–5)
EOSINOPHIL NFR BLD: 2 % (ref 0–5)
EOSINOPHIL NFR BLD: 3 % (ref 0–5)
EPITH CASTS URNS QL MICRO: ABNORMAL /LPF (ref 0–5)
EPITH CASTS URNS QL MICRO: NEGATIVE /LPF (ref 0–5)
ERYTHROCYTE [DISTWIDTH] IN BLOOD BY AUTOMATED COUNT: 13.5 % (ref 11.6–14.5)
ERYTHROCYTE [DISTWIDTH] IN BLOOD BY AUTOMATED COUNT: 13.6 % (ref 11.6–14.5)
ERYTHROCYTE [DISTWIDTH] IN BLOOD BY AUTOMATED COUNT: 13.6 % (ref 11.6–14.5)
ERYTHROCYTE [DISTWIDTH] IN BLOOD BY AUTOMATED COUNT: 13.8 % (ref 11.6–14.5)
ERYTHROCYTE [DISTWIDTH] IN BLOOD BY AUTOMATED COUNT: 13.9 % (ref 11.6–14.5)
ERYTHROCYTE [DISTWIDTH] IN BLOOD BY AUTOMATED COUNT: 14 % (ref 11.6–14.5)
ERYTHROCYTE [DISTWIDTH] IN BLOOD BY AUTOMATED COUNT: 14.2 % (ref 11.6–14.5)
ERYTHROCYTE [DISTWIDTH] IN BLOOD BY AUTOMATED COUNT: 14.4 % (ref 11.6–14.5)
ERYTHROCYTE [DISTWIDTH] IN BLOOD BY AUTOMATED COUNT: 14.5 % (ref 11.6–14.5)
ERYTHROCYTE [DISTWIDTH] IN BLOOD BY AUTOMATED COUNT: 14.6 % (ref 11.6–14.5)
GLOBULIN SER CALC-MCNC: 4.7 G/DL (ref 2–4)
GLOBULIN SER CALC-MCNC: 5.2 G/DL (ref 2–4)
GLOBULIN SER CALC-MCNC: 5.3 G/DL (ref 2–4)
GLOBULIN SER CALC-MCNC: 6.4 G/DL (ref 2–4)
GLUCOSE SERPL-MCNC: 105 MG/DL (ref 74–99)
GLUCOSE SERPL-MCNC: 64 MG/DL (ref 74–99)
GLUCOSE SERPL-MCNC: 79 MG/DL (ref 74–99)
GLUCOSE SERPL-MCNC: 85 MG/DL (ref 74–99)
GLUCOSE SERPL-MCNC: 88 MG/DL (ref 74–99)
GLUCOSE SERPL-MCNC: 91 MG/DL (ref 74–99)
GLUCOSE SERPL-MCNC: 93 MG/DL (ref 74–99)
GLUCOSE SERPL-MCNC: 95 MG/DL (ref 74–99)
GLUCOSE SERPL-MCNC: 96 MG/DL (ref 74–99)
GLUCOSE SERPL-MCNC: 96 MG/DL (ref 74–99)
GLUCOSE SERPL-MCNC: 99 MG/DL (ref 74–99)
GLUCOSE UR STRIP.AUTO-MCNC: NEGATIVE MG/DL
GRAM STN SPEC: ABNORMAL
GRAM STN SPEC: ABNORMAL
HCT VFR BLD AUTO: 26.4 % (ref 36–48)
HCT VFR BLD AUTO: 26.5 % (ref 36–48)
HCT VFR BLD AUTO: 26.8 % (ref 36–48)
HCT VFR BLD AUTO: 26.8 % (ref 36–48)
HCT VFR BLD AUTO: 28.8 % (ref 36–48)
HCT VFR BLD AUTO: 29.1 % (ref 36–48)
HCT VFR BLD AUTO: 29.1 % (ref 36–48)
HCT VFR BLD AUTO: 30.5 % (ref 36–48)
HCT VFR BLD AUTO: 32.2 % (ref 36–48)
HCT VFR BLD AUTO: 32.8 % (ref 36–48)
HGB BLD-MCNC: 10.5 G/DL (ref 13–16)
HGB BLD-MCNC: 11 G/DL (ref 13–16)
HGB BLD-MCNC: 8.4 G/DL (ref 13–16)
HGB BLD-MCNC: 8.4 G/DL (ref 13–16)
HGB BLD-MCNC: 8.5 G/DL (ref 13–16)
HGB BLD-MCNC: 8.6 G/DL (ref 13–16)
HGB BLD-MCNC: 9.3 G/DL (ref 13–16)
HGB BLD-MCNC: 9.4 G/DL (ref 13–16)
HGB BLD-MCNC: 9.5 G/DL (ref 13–16)
HGB BLD-MCNC: 9.6 G/DL (ref 13–16)
HGB UR QL STRIP: ABNORMAL
INTERPRETATION: ABNORMAL
KETONES UR QL STRIP.AUTO: ABNORMAL MG/DL
KETONES UR QL STRIP.AUTO: NEGATIVE MG/DL
KETONES UR QL STRIP.AUTO: NEGATIVE MG/DL
LACTATE BLD-SCNC: 0.99 MMOL/L (ref 0.4–2)
LACTATE BLD-SCNC: 1.18 MMOL/L (ref 0.4–2)
LACTATE BLD-SCNC: 1.56 MMOL/L (ref 0.4–2)
LACTATE SERPL-SCNC: 0.8 MMOL/L (ref 0.4–2)
LEUKOCYTE ESTERASE UR QL STRIP.AUTO: ABNORMAL
LYMPHOCYTES # BLD: 0.9 K/UL (ref 0.9–3.6)
LYMPHOCYTES # BLD: 1 K/UL (ref 0.9–3.6)
LYMPHOCYTES # BLD: 1.1 K/UL (ref 0.9–3.6)
LYMPHOCYTES # BLD: 1.1 K/UL (ref 0.9–3.6)
LYMPHOCYTES # BLD: 1.2 K/UL (ref 0.9–3.6)
LYMPHOCYTES # BLD: 1.3 K/UL (ref 0.9–3.6)
LYMPHOCYTES # BLD: 1.4 K/UL (ref 0.9–3.6)
LYMPHOCYTES NFR BLD: 12 % (ref 21–52)
LYMPHOCYTES NFR BLD: 7 % (ref 21–52)
LYMPHOCYTES NFR BLD: 7 % (ref 21–52)
LYMPHOCYTES NFR BLD: 8 % (ref 21–52)
LYMPHOCYTES NFR BLD: 9 % (ref 21–52)
MAGNESIUM SERPL-MCNC: 2 MG/DL (ref 1.6–2.6)
MAGNESIUM SERPL-MCNC: 2 MG/DL (ref 1.6–2.6)
MAGNESIUM SERPL-MCNC: 2.2 MG/DL (ref 1.6–2.6)
MAGNESIUM SERPL-MCNC: 2.2 MG/DL (ref 1.6–2.6)
MAGNESIUM SERPL-MCNC: 2.3 MG/DL (ref 1.6–2.6)
MAGNESIUM SERPL-MCNC: 2.3 MG/DL (ref 1.6–2.6)
MAGNESIUM SERPL-MCNC: 2.4 MG/DL (ref 1.6–2.6)
MCH RBC QN AUTO: 28.8 PG (ref 24–34)
MCH RBC QN AUTO: 28.9 PG (ref 24–34)
MCH RBC QN AUTO: 28.9 PG (ref 24–34)
MCH RBC QN AUTO: 29.2 PG (ref 24–34)
MCH RBC QN AUTO: 29.3 PG (ref 24–34)
MCH RBC QN AUTO: 29.5 PG (ref 24–34)
MCH RBC QN AUTO: 30.1 PG (ref 24–34)
MCH RBC QN AUTO: 30.2 PG (ref 24–34)
MCH RBC QN AUTO: 30.3 PG (ref 24–34)
MCH RBC QN AUTO: 30.6 PG (ref 24–34)
MCHC RBC AUTO-ENTMCNC: 31.3 G/DL (ref 31–37)
MCHC RBC AUTO-ENTMCNC: 31.5 G/DL (ref 31–37)
MCHC RBC AUTO-ENTMCNC: 31.8 G/DL (ref 31–37)
MCHC RBC AUTO-ENTMCNC: 32.1 G/DL (ref 31–37)
MCHC RBC AUTO-ENTMCNC: 32.1 G/DL (ref 31–37)
MCHC RBC AUTO-ENTMCNC: 32.3 G/DL (ref 31–37)
MCHC RBC AUTO-ENTMCNC: 32.3 G/DL (ref 31–37)
MCHC RBC AUTO-ENTMCNC: 32.6 G/DL (ref 31–37)
MCHC RBC AUTO-ENTMCNC: 32.6 G/DL (ref 31–37)
MCHC RBC AUTO-ENTMCNC: 33.5 G/DL (ref 31–37)
MCV RBC AUTO: 88.7 FL (ref 78–100)
MCV RBC AUTO: 90.1 FL (ref 78–100)
MCV RBC AUTO: 91.4 FL (ref 78–100)
MCV RBC AUTO: 91.6 FL (ref 78–100)
MCV RBC AUTO: 91.7 FL (ref 78–100)
MCV RBC AUTO: 91.8 FL (ref 78–100)
MCV RBC AUTO: 92.1 FL (ref 78–100)
MCV RBC AUTO: 93.4 FL (ref 78–100)
MCV RBC AUTO: 93.6 FL (ref 78–100)
MCV RBC AUTO: 93.8 FL (ref 78–100)
MONOCYTES # BLD: 0.7 K/UL (ref 0.05–1.2)
MONOCYTES # BLD: 0.7 K/UL (ref 0.05–1.2)
MONOCYTES # BLD: 0.8 K/UL (ref 0.05–1.2)
MONOCYTES # BLD: 0.9 K/UL (ref 0.05–1.2)
MONOCYTES # BLD: 1.3 K/UL (ref 0.05–1.2)
MONOCYTES NFR BLD: 10 % (ref 3–10)
MONOCYTES NFR BLD: 4 % (ref 3–10)
MONOCYTES NFR BLD: 6 % (ref 3–10)
MONOCYTES NFR BLD: 7 % (ref 3–10)
MONOCYTES NFR BLD: 7 % (ref 3–10)
MONOCYTES NFR BLD: 8 % (ref 3–10)
MONOCYTES NFR BLD: 8 % (ref 3–10)
NEUTS SEG # BLD: 12.1 K/UL (ref 1.8–8)
NEUTS SEG # BLD: 12.5 K/UL (ref 1.8–8)
NEUTS SEG # BLD: 13.6 K/UL (ref 1.8–8)
NEUTS SEG # BLD: 7.2 K/UL (ref 1.8–8)
NEUTS SEG # BLD: 7.5 K/UL (ref 1.8–8)
NEUTS SEG # BLD: 8.6 K/UL (ref 1.8–8)
NEUTS SEG # BLD: 9.5 K/UL (ref 1.8–8)
NEUTS SEG NFR BLD: 76 % (ref 40–73)
NEUTS SEG NFR BLD: 78 % (ref 40–73)
NEUTS SEG NFR BLD: 78 % (ref 40–73)
NEUTS SEG NFR BLD: 79 % (ref 40–73)
NEUTS SEG NFR BLD: 84 % (ref 40–73)
NEUTS SEG NFR BLD: 87 % (ref 40–73)
NEUTS SEG NFR BLD: 88 % (ref 40–73)
NITRITE UR QL STRIP.AUTO: NEGATIVE
P-R INTERVAL, ECG05: 162 MS
P-R INTERVAL, ECG05: 208 MS
P-R INTERVAL, ECG05: 232 MS
PCR REFLEX: ABNORMAL
PH UR STRIP: 5 [PH] (ref 5–8)
PH UR STRIP: 5.5 [PH] (ref 5–8)
PH UR STRIP: 5.5 [PH] (ref 5–8)
PLATELET # BLD AUTO: 219 K/UL (ref 135–420)
PLATELET # BLD AUTO: 228 K/UL (ref 135–420)
PLATELET # BLD AUTO: 256 K/UL (ref 135–420)
PLATELET # BLD AUTO: 256 K/UL (ref 135–420)
PLATELET # BLD AUTO: 318 K/UL (ref 135–420)
PLATELET # BLD AUTO: 391 K/UL (ref 135–420)
PLATELET # BLD AUTO: 393 K/UL (ref 135–420)
PLATELET # BLD AUTO: 400 K/UL (ref 135–420)
PLATELET # BLD AUTO: 420 K/UL (ref 135–420)
PLATELET # BLD AUTO: 444 K/UL (ref 135–420)
PMV BLD AUTO: 10 FL (ref 9.2–11.8)
PMV BLD AUTO: 10 FL (ref 9.2–11.8)
PMV BLD AUTO: 10.2 FL (ref 9.2–11.8)
PMV BLD AUTO: 10.3 FL (ref 9.2–11.8)
PMV BLD AUTO: 10.6 FL (ref 9.2–11.8)
PMV BLD AUTO: 11.2 FL (ref 9.2–11.8)
PMV BLD AUTO: 11.2 FL (ref 9.2–11.8)
PMV BLD AUTO: 11.5 FL (ref 9.2–11.8)
PMV BLD AUTO: 11.6 FL (ref 9.2–11.8)
PMV BLD AUTO: 11.9 FL (ref 9.2–11.8)
POTASSIUM SERPL-SCNC: 3.5 MMOL/L (ref 3.5–5.5)
POTASSIUM SERPL-SCNC: 3.5 MMOL/L (ref 3.5–5.5)
POTASSIUM SERPL-SCNC: 3.7 MMOL/L (ref 3.5–5.5)
POTASSIUM SERPL-SCNC: 3.7 MMOL/L (ref 3.5–5.5)
POTASSIUM SERPL-SCNC: 3.8 MMOL/L (ref 3.5–5.5)
POTASSIUM SERPL-SCNC: 3.9 MMOL/L (ref 3.5–5.5)
POTASSIUM SERPL-SCNC: 4.1 MMOL/L (ref 3.5–5.5)
POTASSIUM SERPL-SCNC: 4.2 MMOL/L (ref 3.5–5.5)
POTASSIUM SERPL-SCNC: 4.5 MMOL/L (ref 3.5–5.5)
POTASSIUM SERPL-SCNC: 4.6 MMOL/L (ref 3.5–5.5)
POTASSIUM SERPL-SCNC: 5 MMOL/L (ref 3.5–5.5)
PROCALCITONIN SERPL-MCNC: 7.4 NG/ML
PROT SERPL-MCNC: 7.2 G/DL (ref 6.4–8.2)
PROT SERPL-MCNC: 7.3 G/DL (ref 6.4–8.2)
PROT SERPL-MCNC: 8 G/DL (ref 6.4–8.2)
PROT SERPL-MCNC: 8.6 G/DL (ref 6.4–8.2)
PROT UR STRIP-MCNC: 100 MG/DL
PROT UR STRIP-MCNC: 30 MG/DL
PROT UR STRIP-MCNC: 30 MG/DL
Q-T INTERVAL, ECG07: 370 MS
Q-T INTERVAL, ECG07: 374 MS
Q-T INTERVAL, ECG07: 430 MS
QRS DURATION, ECG06: 66 MS
QRS DURATION, ECG06: 84 MS
QRS DURATION, ECG06: 88 MS
QTC CALCULATION (BEZET), ECG08: 460 MS
QTC CALCULATION (BEZET), ECG08: 462 MS
QTC CALCULATION (BEZET), ECG08: 472 MS
RBC # BLD AUTO: 2.87 M/UL (ref 4.35–5.65)
RBC # BLD AUTO: 2.88 M/UL (ref 4.35–5.65)
RBC # BLD AUTO: 2.92 M/UL (ref 4.35–5.65)
RBC # BLD AUTO: 2.94 M/UL (ref 4.35–5.65)
RBC # BLD AUTO: 3.07 M/UL (ref 4.35–5.65)
RBC # BLD AUTO: 3.11 M/UL (ref 4.35–5.65)
RBC # BLD AUTO: 3.16 M/UL (ref 4.35–5.65)
RBC # BLD AUTO: 3.33 M/UL (ref 4.35–5.65)
RBC # BLD AUTO: 3.59 M/UL (ref 4.35–5.65)
RBC # BLD AUTO: 3.63 M/UL (ref 4.35–5.65)
RBC #/AREA URNS HPF: ABNORMAL /HPF (ref 0–5)
SERVICE CMNT-IMP: ABNORMAL
SERVICE CMNT-IMP: ABNORMAL
SERVICE CMNT-IMP: NORMAL
SODIUM SERPL-SCNC: 134 MMOL/L (ref 136–145)
SODIUM SERPL-SCNC: 135 MMOL/L (ref 136–145)
SODIUM SERPL-SCNC: 136 MMOL/L (ref 136–145)
SODIUM SERPL-SCNC: 136 MMOL/L (ref 136–145)
SODIUM SERPL-SCNC: 137 MMOL/L (ref 136–145)
SODIUM SERPL-SCNC: 137 MMOL/L (ref 136–145)
SODIUM SERPL-SCNC: 138 MMOL/L (ref 136–145)
SODIUM SERPL-SCNC: 139 MMOL/L (ref 136–145)
SODIUM SERPL-SCNC: 139 MMOL/L (ref 136–145)
SODIUM SERPL-SCNC: 142 MMOL/L (ref 136–145)
SODIUM SERPL-SCNC: 145 MMOL/L (ref 136–145)
SOURCE, COVRS: NORMAL
SP GR UR REFRACTOMETRY: 1.01 (ref 1–1.03)
SP GR UR REFRACTOMETRY: 1.01 (ref 1–1.03)
SP GR UR REFRACTOMETRY: 1.02 (ref 1–1.03)
TROPONIN I SERPL-MCNC: 0.07 NG/ML (ref 0–0.04)
URATE CRY URNS QL MICRO: ABNORMAL
UROBILINOGEN UR QL STRIP.AUTO: 0.2 EU/DL (ref 0.2–1)
UROBILINOGEN UR QL STRIP.AUTO: 0.2 EU/DL (ref 0.2–1)
UROBILINOGEN UR QL STRIP.AUTO: 1 EU/DL (ref 0.2–1)
VANCOMYCIN SERPL-MCNC: 1.9 UG/ML (ref 5–40)
VANCOMYCIN SERPL-MCNC: 16.4 UG/ML (ref 5–40)
VANCOMYCIN SERPL-MCNC: 19.4 UG/ML (ref 5–40)
VENTRICULAR RATE, ECG03: 69 BPM
VENTRICULAR RATE, ECG03: 94 BPM
VENTRICULAR RATE, ECG03: 96 BPM
WBC # BLD AUTO: 11.3 K/UL (ref 4.6–13.2)
WBC # BLD AUTO: 11.4 K/UL (ref 4.6–13.2)
WBC # BLD AUTO: 11.8 K/UL (ref 4.6–13.2)
WBC # BLD AUTO: 14.4 K/UL (ref 4.6–13.2)
WBC # BLD AUTO: 15.3 K/UL (ref 4.6–13.2)
WBC # BLD AUTO: 15.5 K/UL (ref 4.6–13.2)
WBC # BLD AUTO: 15.6 K/UL (ref 4.6–13.2)
WBC # BLD AUTO: 9.3 K/UL (ref 4.6–13.2)
WBC # BLD AUTO: 9.5 K/UL (ref 4.6–13.2)
WBC # BLD AUTO: 9.7 K/UL (ref 4.6–13.2)
WBC URNS QL MICRO: ABNORMAL /HPF (ref 0–4)
WBC URNS QL MICRO: ABNORMAL /HPF (ref 0–5)
YEAST URNS QL MICRO: ABNORMAL

## 2021-01-01 PROCEDURE — 83735 ASSAY OF MAGNESIUM: CPT

## 2021-01-01 PROCEDURE — 83605 ASSAY OF LACTIC ACID: CPT

## 2021-01-01 PROCEDURE — 99239 HOSP IP/OBS DSCHRG MGMT >30: CPT | Performed by: INTERNAL MEDICINE

## 2021-01-01 PROCEDURE — 74011250636 HC RX REV CODE- 250/636: Performed by: PHYSICIAN ASSISTANT

## 2021-01-01 PROCEDURE — 91301 COVID-19, MRNA, LNP-S, PF, 100MCG/0.5ML DOSE(MODERNA): CPT | Performed by: FAMILY MEDICINE

## 2021-01-01 PROCEDURE — 85025 COMPLETE CBC W/AUTO DIFF WBC: CPT

## 2021-01-01 PROCEDURE — 74011250636 HC RX REV CODE- 250/636: Performed by: INTERNAL MEDICINE

## 2021-01-01 PROCEDURE — 80202 ASSAY OF VANCOMYCIN: CPT

## 2021-01-01 PROCEDURE — 36415 COLL VENOUS BLD VENIPUNCTURE: CPT

## 2021-01-01 PROCEDURE — 99285 EMERGENCY DEPT VISIT HI MDM: CPT

## 2021-01-01 PROCEDURE — 74011000250 HC RX REV CODE- 250: Performed by: INTERNAL MEDICINE

## 2021-01-01 PROCEDURE — 71045 X-RAY EXAM CHEST 1 VIEW: CPT

## 2021-01-01 PROCEDURE — G0299 HHS/HOSPICE OF RN EA 15 MIN: HCPCS

## 2021-01-01 PROCEDURE — 87635 SARS-COV-2 COVID-19 AMP PRB: CPT

## 2021-01-01 PROCEDURE — 65660000000 HC RM CCU STEPDOWN

## 2021-01-01 PROCEDURE — 74011250637 HC RX REV CODE- 250/637: Performed by: INTERNAL MEDICINE

## 2021-01-01 PROCEDURE — 96375 TX/PRO/DX INJ NEW DRUG ADDON: CPT

## 2021-01-01 PROCEDURE — 3331090001 HH PPS REVENUE CREDIT

## 2021-01-01 PROCEDURE — C9113 INJ PANTOPRAZOLE SODIUM, VIA: HCPCS | Performed by: INTERNAL MEDICINE

## 2021-01-01 PROCEDURE — G8420 CALC BMI NORM PARAMETERS: HCPCS | Performed by: EMERGENCY MEDICINE

## 2021-01-01 PROCEDURE — 0651 HSPC ROUTINE HOME CARE

## 2021-01-01 PROCEDURE — G9717 DOC PT DX DEP/BP F/U NT REQ: HCPCS | Performed by: EMERGENCY MEDICINE

## 2021-01-01 PROCEDURE — 87106 FUNGI IDENTIFICATION YEAST: CPT

## 2021-01-01 PROCEDURE — 99213 OFFICE O/P EST LOW 20 MIN: CPT | Performed by: EMERGENCY MEDICINE

## 2021-01-01 PROCEDURE — 400018 HH-NO PAY CLAIM PROCEDURE

## 2021-01-01 PROCEDURE — 2709999900 HC NON-CHARGEABLE SUPPLY

## 2021-01-01 PROCEDURE — 99232 SBSQ HOSP IP/OBS MODERATE 35: CPT | Performed by: INTERNAL MEDICINE

## 2021-01-01 PROCEDURE — 3331090002 HH PPS REVENUE DEBIT

## 2021-01-01 PROCEDURE — 74011000258 HC RX REV CODE- 258: Performed by: INTERNAL MEDICINE

## 2021-01-01 PROCEDURE — 74011000250 HC RX REV CODE- 250: Performed by: EMERGENCY MEDICINE

## 2021-01-01 PROCEDURE — 96372 THER/PROPH/DIAG INJ SC/IM: CPT

## 2021-01-01 PROCEDURE — 99218 HC RM OBSERVATION: CPT

## 2021-01-01 PROCEDURE — 80053 COMPREHEN METABOLIC PANEL: CPT

## 2021-01-01 PROCEDURE — G0157 HHC PT ASSISTANT EA 15: HCPCS

## 2021-01-01 PROCEDURE — 77030040393 HC DRSG OPTIFOAM GENT MDII -B

## 2021-01-01 PROCEDURE — 74011250637 HC RX REV CODE- 250/637: Performed by: NURSE PRACTITIONER

## 2021-01-01 PROCEDURE — 0011A COVID-19, MRNA, LNP-S, PF, 100MCG/0.5ML DOSE(MODERNA): CPT | Performed by: FAMILY MEDICINE

## 2021-01-01 PROCEDURE — G0300 HHS/HOSPICE OF LPN EA 15 MIN: HCPCS

## 2021-01-01 PROCEDURE — 65270000029 HC RM PRIVATE

## 2021-01-01 PROCEDURE — 92526 ORAL FUNCTION THERAPY: CPT

## 2021-01-01 PROCEDURE — G8536 NO DOC ELDER MAL SCRN: HCPCS | Performed by: EMERGENCY MEDICINE

## 2021-01-01 PROCEDURE — 93005 ELECTROCARDIOGRAM TRACING: CPT

## 2021-01-01 PROCEDURE — 80048 BASIC METABOLIC PNL TOTAL CA: CPT

## 2021-01-01 PROCEDURE — 96374 THER/PROPH/DIAG INJ IV PUSH: CPT

## 2021-01-01 PROCEDURE — 99284 EMERGENCY DEPT VISIT MOD MDM: CPT

## 2021-01-01 PROCEDURE — 96376 TX/PRO/DX INJ SAME DRUG ADON: CPT

## 2021-01-01 PROCEDURE — 74011000250 HC RX REV CODE- 250: Performed by: STUDENT IN AN ORGANIZED HEALTH CARE EDUCATION/TRAINING PROGRAM

## 2021-01-01 PROCEDURE — 92610 EVALUATE SWALLOWING FUNCTION: CPT

## 2021-01-01 PROCEDURE — 87324 CLOSTRIDIUM AG IA: CPT

## 2021-01-01 PROCEDURE — 85027 COMPLETE CBC AUTOMATED: CPT

## 2021-01-01 PROCEDURE — 87086 URINE CULTURE/COLONY COUNT: CPT

## 2021-01-01 PROCEDURE — 87040 BLOOD CULTURE FOR BACTERIA: CPT

## 2021-01-01 PROCEDURE — 51702 INSERT TEMP BLADDER CATH: CPT

## 2021-01-01 PROCEDURE — 84145 PROCALCITONIN (PCT): CPT

## 2021-01-01 PROCEDURE — G0152 HHCP-SERV OF OT,EA 15 MIN: HCPCS

## 2021-01-01 PROCEDURE — 97161 PT EVAL LOW COMPLEX 20 MIN: CPT

## 2021-01-01 PROCEDURE — 99221 1ST HOSP IP/OBS SF/LOW 40: CPT | Performed by: NURSE PRACTITIONER

## 2021-01-01 PROCEDURE — 76450000000

## 2021-01-01 PROCEDURE — 99219 PR INITIAL OBSERVATION CARE/DAY 50 MINUTES: CPT | Performed by: INTERNAL MEDICINE

## 2021-01-01 PROCEDURE — 0TJB8ZZ INSPECTION OF BLADDER, VIA NATURAL OR ARTIFICIAL OPENING ENDOSCOPIC: ICD-10-PCS | Performed by: UROLOGY

## 2021-01-01 PROCEDURE — 0012A COVID-19, MRNA, LNP-S, PF, 100MCG/0.5ML DOSE(MODERNA): CPT | Performed by: FAMILY MEDICINE

## 2021-01-01 PROCEDURE — 1101F PT FALLS ASSESS-DOCD LE1/YR: CPT | Performed by: EMERGENCY MEDICINE

## 2021-01-01 PROCEDURE — 99443 PR PHYS/QHP TELEPHONE EVALUATION 21-30 MIN: CPT | Performed by: EMERGENCY MEDICINE

## 2021-01-01 PROCEDURE — 3336500001 HSPC ELECTION

## 2021-01-01 PROCEDURE — 77030040392 HC DRSG OPTIFOAM MDII -A

## 2021-01-01 PROCEDURE — 73502 X-RAY EXAM HIP UNI 2-3 VIEWS: CPT

## 2021-01-01 PROCEDURE — G8427 DOCREV CUR MEDS BY ELIG CLIN: HCPCS | Performed by: EMERGENCY MEDICINE

## 2021-01-01 PROCEDURE — 81001 URINALYSIS AUTO W/SCOPE: CPT

## 2021-01-01 PROCEDURE — G0439 PPPS, SUBSEQ VISIT: HCPCS | Performed by: EMERGENCY MEDICINE

## 2021-01-01 PROCEDURE — 97165 OT EVAL LOW COMPLEX 30 MIN: CPT

## 2021-01-01 PROCEDURE — HOSPICE MEDICATION HC HH HOSPICE MEDICATION

## 2021-01-01 PROCEDURE — 3331090004 HSPC SERVICE INTENSITY ADD-ON

## 2021-01-01 PROCEDURE — 74011250636 HC RX REV CODE- 250/636: Performed by: EMERGENCY MEDICINE

## 2021-01-01 PROCEDURE — 99222 1ST HOSP IP/OBS MODERATE 55: CPT | Performed by: INTERNAL MEDICINE

## 2021-01-01 PROCEDURE — 74011000250 HC RX REV CODE- 250: Performed by: PHYSICIAN ASSISTANT

## 2021-01-01 PROCEDURE — 97530 THERAPEUTIC ACTIVITIES: CPT

## 2021-01-01 PROCEDURE — 87493 C DIFF AMPLIFIED PROBE: CPT

## 2021-01-01 PROCEDURE — 99223 1ST HOSP IP/OBS HIGH 75: CPT | Performed by: INTERNAL MEDICINE

## 2021-01-01 PROCEDURE — 74011250637 HC RX REV CODE- 250/637: Performed by: PHYSICIAN ASSISTANT

## 2021-01-01 PROCEDURE — 99214 OFFICE O/P EST MOD 30 MIN: CPT | Performed by: EMERGENCY MEDICINE

## 2021-01-01 PROCEDURE — 74011250636 HC RX REV CODE- 250/636: Performed by: STUDENT IN AN ORGANIZED HEALTH CARE EDUCATION/TRAINING PROGRAM

## 2021-01-01 PROCEDURE — 74177 CT ABD & PELVIS W/CONTRAST: CPT

## 2021-01-01 PROCEDURE — 74018 RADEX ABDOMEN 1 VIEW: CPT

## 2021-01-01 PROCEDURE — 1111F DSCHRG MED/CURRENT MED MERGE: CPT | Performed by: EMERGENCY MEDICINE

## 2021-01-01 PROCEDURE — G0151 HHCP-SERV OF PT,EA 15 MIN: HCPCS

## 2021-01-01 PROCEDURE — 82553 CREATINE MB FRACTION: CPT

## 2021-01-01 PROCEDURE — 96365 THER/PROPH/DIAG IV INF INIT: CPT

## 2021-01-01 PROCEDURE — 74011000636 HC RX REV CODE- 636: Performed by: EMERGENCY MEDICINE

## 2021-01-01 PROCEDURE — 77030041075 HC DRSG AG OPTIFRM MDII -B

## 2021-01-01 PROCEDURE — 97166 OT EVAL MOD COMPLEX 45 MIN: CPT

## 2021-01-01 PROCEDURE — 400013 HH SOC

## 2021-01-01 PROCEDURE — 51798 US URINE CAPACITY MEASURE: CPT

## 2021-01-01 PROCEDURE — 99222 1ST HOSP IP/OBS MODERATE 55: CPT | Performed by: NURSE PRACTITIONER

## 2021-01-01 RX ORDER — SODIUM CHLORIDE 0.9 % (FLUSH) 0.9 %
5-40 SYRINGE (ML) INJECTION EVERY 8 HOURS
Status: DISCONTINUED | OUTPATIENT
Start: 2021-01-01 | End: 2021-01-01 | Stop reason: HOSPADM

## 2021-01-01 RX ORDER — ENOXAPARIN SODIUM 100 MG/ML
30 INJECTION SUBCUTANEOUS DAILY
Status: DISCONTINUED | OUTPATIENT
Start: 2021-01-01 | End: 2021-01-01 | Stop reason: HOSPADM

## 2021-01-01 RX ORDER — POLYETHYLENE GLYCOL 3350 17 G/17G
17 POWDER, FOR SOLUTION ORAL DAILY PRN
Status: DISCONTINUED | OUTPATIENT
Start: 2021-01-01 | End: 2021-01-01 | Stop reason: HOSPADM

## 2021-01-01 RX ORDER — ACETAMINOPHEN 325 MG/1
650 TABLET ORAL
Status: DISCONTINUED | OUTPATIENT
Start: 2021-01-01 | End: 2021-01-01 | Stop reason: HOSPADM

## 2021-01-01 RX ORDER — ACETAMINOPHEN 650 MG/1
650 SUPPOSITORY RECTAL
Status: DISCONTINUED | OUTPATIENT
Start: 2021-01-01 | End: 2021-01-01 | Stop reason: HOSPADM

## 2021-01-01 RX ORDER — FINASTERIDE 5 MG/1
5 TABLET, FILM COATED ORAL DAILY
Qty: 30 TABLET | Refills: 0 | Status: SHIPPED | OUTPATIENT
Start: 2021-01-01 | End: 2021-01-01

## 2021-01-01 RX ORDER — LORAZEPAM 2 MG/ML
1 CONCENTRATE ORAL
Status: DISCONTINUED | OUTPATIENT
Start: 2021-01-01 | End: 2021-01-01 | Stop reason: HOSPADM

## 2021-01-01 RX ORDER — HEPARIN SODIUM 5000 [USP'U]/ML
5000 INJECTION, SOLUTION INTRAVENOUS; SUBCUTANEOUS EVERY 8 HOURS
Status: DISCONTINUED | OUTPATIENT
Start: 2021-01-01 | End: 2021-01-01

## 2021-01-01 RX ORDER — ERYTHROMYCIN 5 MG/G
OINTMENT OPHTHALMIC
COMMUNITY
Start: 2021-01-01 | End: 2021-01-01

## 2021-01-01 RX ORDER — PROPRANOLOL HYDROCHLORIDE 10 MG/1
10 TABLET ORAL 2 TIMES DAILY
Status: DISCONTINUED | OUTPATIENT
Start: 2021-01-01 | End: 2021-01-01 | Stop reason: HOSPADM

## 2021-01-01 RX ORDER — LEVOFLOXACIN 5 MG/ML
500 INJECTION, SOLUTION INTRAVENOUS
Status: DISCONTINUED | OUTPATIENT
Start: 2021-01-01 | End: 2021-01-01

## 2021-01-01 RX ORDER — HEPARIN SODIUM 5000 [USP'U]/ML
5000 INJECTION, SOLUTION INTRAVENOUS; SUBCUTANEOUS EVERY 8 HOURS
Status: DISCONTINUED | OUTPATIENT
Start: 2021-01-01 | End: 2021-01-01 | Stop reason: HOSPADM

## 2021-01-01 RX ORDER — SODIUM CHLORIDE 0.9 % (FLUSH) 0.9 %
5-10 SYRINGE (ML) INJECTION AS NEEDED
Status: DISCONTINUED | OUTPATIENT
Start: 2021-01-01 | End: 2021-01-01 | Stop reason: HOSPADM

## 2021-01-01 RX ORDER — IPRATROPIUM BROMIDE AND ALBUTEROL SULFATE 2.5; .5 MG/3ML; MG/3ML
3 SOLUTION RESPIRATORY (INHALATION)
Status: DISCONTINUED | OUTPATIENT
Start: 2021-01-01 | End: 2021-01-01

## 2021-01-01 RX ORDER — FINASTERIDE 5 MG/1
5 TABLET, FILM COATED ORAL DAILY
Status: DISCONTINUED | OUTPATIENT
Start: 2021-01-01 | End: 2021-01-01 | Stop reason: HOSPADM

## 2021-01-01 RX ORDER — ONDANSETRON 4 MG/1
4 TABLET, ORALLY DISINTEGRATING ORAL
Status: DISCONTINUED | OUTPATIENT
Start: 2021-01-01 | End: 2021-01-01 | Stop reason: HOSPADM

## 2021-01-01 RX ORDER — RISPERIDONE 0.25 MG/1
0.5 TABLET, FILM COATED ORAL EVERY EVENING
Status: DISCONTINUED | OUTPATIENT
Start: 2021-01-01 | End: 2021-01-01 | Stop reason: HOSPADM

## 2021-01-01 RX ORDER — MIDAZOLAM HYDROCHLORIDE 1 MG/ML
2 INJECTION, SOLUTION INTRAMUSCULAR; INTRAVENOUS ONCE
Status: COMPLETED | OUTPATIENT
Start: 2021-01-01 | End: 2021-01-01

## 2021-01-01 RX ORDER — RISPERIDONE 0.5 MG/1
0.5 TABLET, FILM COATED ORAL EVERY EVENING
Status: DISCONTINUED | OUTPATIENT
Start: 2021-01-01 | End: 2021-01-01 | Stop reason: HOSPADM

## 2021-01-01 RX ORDER — SODIUM CHLORIDE 0.9 % (FLUSH) 0.9 %
5-40 SYRINGE (ML) INJECTION AS NEEDED
Status: DISCONTINUED | OUTPATIENT
Start: 2021-01-01 | End: 2021-01-01 | Stop reason: HOSPADM

## 2021-01-01 RX ORDER — MORPHINE SULFATE 20 MG/ML
5 SOLUTION ORAL
Status: DISCONTINUED | OUTPATIENT
Start: 2021-01-01 | End: 2021-01-01

## 2021-01-01 RX ORDER — LORAZEPAM 2 MG/ML
1 INJECTION INTRAMUSCULAR
Status: DISCONTINUED | OUTPATIENT
Start: 2021-01-01 | End: 2021-01-01 | Stop reason: HOSPADM

## 2021-01-01 RX ORDER — METRONIDAZOLE 500 MG/1
500 TABLET ORAL 3 TIMES DAILY
Qty: 30 TABLET | Refills: 0 | Status: SHIPPED | OUTPATIENT
Start: 2021-01-01 | End: 2021-01-01

## 2021-01-01 RX ORDER — TAMSULOSIN HYDROCHLORIDE 0.4 MG/1
0.4 CAPSULE ORAL DAILY
Status: DISCONTINUED | OUTPATIENT
Start: 2021-01-01 | End: 2021-01-01 | Stop reason: HOSPADM

## 2021-01-01 RX ORDER — TAMSULOSIN HYDROCHLORIDE 0.4 MG/1
0.4 CAPSULE ORAL DAILY
Qty: 30 CAPSULE | Refills: 0 | Status: SHIPPED | OUTPATIENT
Start: 2021-01-01 | End: 2021-01-01

## 2021-01-01 RX ORDER — VANCOMYCIN 2 GRAM/500 ML IN 0.9 % SODIUM CHLORIDE INTRAVENOUS
2000 ONCE
Status: COMPLETED | OUTPATIENT
Start: 2021-01-01 | End: 2021-01-01

## 2021-01-01 RX ORDER — SODIUM CHLORIDE 9 MG/ML
100 INJECTION, SOLUTION INTRAVENOUS CONTINUOUS
Status: DISPENSED | OUTPATIENT
Start: 2021-01-01 | End: 2021-01-01

## 2021-01-01 RX ORDER — ZOSTER VACCINE RECOMBINANT, ADJUVANTED 50 MCG/0.5
KIT INTRAMUSCULAR
Qty: 0.5 ML | Refills: 1 | Status: SHIPPED | OUTPATIENT
Start: 2021-01-01 | End: 2021-01-01

## 2021-01-01 RX ORDER — RISPERIDONE 0.5 MG/1
TABLET, FILM COATED ORAL
Qty: 120 TABLET | Refills: 3 | Status: SHIPPED | OUTPATIENT
Start: 2021-01-01 | End: 2021-01-01

## 2021-01-01 RX ORDER — FLUCONAZOLE 200 MG/1
200 TABLET ORAL DAILY
Qty: 7 TABLET | Refills: 0 | Status: SHIPPED
Start: 2021-01-01 | End: 2021-10-06

## 2021-01-01 RX ORDER — MORPHINE SULFATE 20 MG/ML
10 SOLUTION ORAL
Status: DISCONTINUED | OUTPATIENT
Start: 2021-01-01 | End: 2021-01-01 | Stop reason: HOSPADM

## 2021-01-01 RX ORDER — ONDANSETRON 2 MG/ML
4 INJECTION INTRAMUSCULAR; INTRAVENOUS
Status: DISCONTINUED | OUTPATIENT
Start: 2021-01-01 | End: 2021-01-01 | Stop reason: HOSPADM

## 2021-01-01 RX ORDER — DEXTROSE MONOHYDRATE AND SODIUM CHLORIDE 5; .9 G/100ML; G/100ML
75 INJECTION, SOLUTION INTRAVENOUS CONTINUOUS
Status: DISCONTINUED | OUTPATIENT
Start: 2021-01-01 | End: 2021-01-01 | Stop reason: HOSPADM

## 2021-01-01 RX ORDER — LEVOFLOXACIN 5 MG/ML
750 INJECTION, SOLUTION INTRAVENOUS ONCE
Status: COMPLETED | OUTPATIENT
Start: 2021-01-01 | End: 2021-01-01

## 2021-01-01 RX ORDER — MORPHINE SULFATE 2 MG/ML
1 INJECTION, SOLUTION INTRAMUSCULAR; INTRAVENOUS
Status: DISCONTINUED | OUTPATIENT
Start: 2021-01-01 | End: 2021-01-01 | Stop reason: HOSPADM

## 2021-01-01 RX ORDER — CEFPODOXIME PROXETIL 100 MG/1
100 TABLET, FILM COATED ORAL 2 TIMES DAILY
Qty: 10 TABLET | Refills: 0 | Status: SHIPPED | OUTPATIENT
Start: 2021-01-01 | End: 2021-01-01

## 2021-01-01 RX ORDER — PANTOPRAZOLE SODIUM 40 MG/1
40 TABLET, DELAYED RELEASE ORAL
Status: DISCONTINUED | OUTPATIENT
Start: 2021-01-01 | End: 2021-01-01 | Stop reason: HOSPADM

## 2021-01-01 RX ORDER — FLUCONAZOLE 200 MG/1
200 TABLET ORAL DAILY
Status: DISCONTINUED | OUTPATIENT
Start: 2021-01-01 | End: 2021-01-01 | Stop reason: HOSPADM

## 2021-01-01 RX ORDER — ACETAMINOPHEN 500 MG
1000 TABLET ORAL
Status: COMPLETED | OUTPATIENT
Start: 2021-01-01 | End: 2021-01-01

## 2021-01-01 RX ORDER — GRANULES FOR ORAL 3 G/1
3 POWDER ORAL ONCE
Status: COMPLETED | OUTPATIENT
Start: 2021-01-01 | End: 2021-01-01

## 2021-01-01 RX ORDER — PROMETHAZINE HYDROCHLORIDE 12.5 MG/1
12.5 TABLET ORAL
Status: DISCONTINUED | OUTPATIENT
Start: 2021-01-01 | End: 2021-01-01 | Stop reason: HOSPADM

## 2021-01-01 RX ORDER — METRONIDAZOLE 500 MG/100ML
500 INJECTION, SOLUTION INTRAVENOUS ONCE
Status: COMPLETED | OUTPATIENT
Start: 2021-01-01 | End: 2021-01-01

## 2021-01-01 RX ORDER — LEVOFLOXACIN 5 MG/ML
750 INJECTION, SOLUTION INTRAVENOUS EVERY 24 HOURS
Status: DISCONTINUED | OUTPATIENT
Start: 2021-01-01 | End: 2021-01-01 | Stop reason: DRUGHIGH

## 2021-01-01 RX ORDER — LORAZEPAM 2 MG/ML
1 CONCENTRATE ORAL
Qty: 30 ML | Refills: 0 | Status: SHIPPED | OUTPATIENT
Start: 2021-01-01 | End: 2021-10-03 | Stop reason: SDUPTHER

## 2021-01-01 RX ORDER — PROPRANOLOL HYDROCHLORIDE 20 MG/1
10 TABLET ORAL 2 TIMES DAILY
Status: DISCONTINUED | OUTPATIENT
Start: 2021-01-01 | End: 2021-01-01 | Stop reason: HOSPADM

## 2021-01-01 RX ORDER — PANTOPRAZOLE SODIUM 40 MG/1
40 TABLET, DELAYED RELEASE ORAL
Status: DISCONTINUED | OUTPATIENT
Start: 2021-01-01 | End: 2021-01-01

## 2021-01-01 RX ORDER — ACETAMINOPHEN 500 MG
1000 TABLET ORAL EVERY 8 HOURS
Qty: 90 TABLET | Refills: 0 | Status: SHIPPED | OUTPATIENT
Start: 2021-01-01

## 2021-01-01 RX ORDER — MORPHINE SULFATE 20 MG/ML
10 SOLUTION ORAL
Qty: 30 ML | Refills: 0 | Status: SHIPPED | OUTPATIENT
Start: 2021-01-01 | End: 2021-10-03 | Stop reason: SDUPTHER

## 2021-01-01 RX ORDER — IPRATROPIUM BROMIDE AND ALBUTEROL SULFATE 2.5; .5 MG/3ML; MG/3ML
3 SOLUTION RESPIRATORY (INHALATION)
Status: DISCONTINUED | OUTPATIENT
Start: 2021-01-01 | End: 2021-01-01 | Stop reason: HOSPADM

## 2021-01-01 RX ADMIN — Medication 10 ML: at 22:15

## 2021-01-01 RX ADMIN — TAMSULOSIN HYDROCHLORIDE 0.4 MG: 0.4 CAPSULE ORAL at 09:54

## 2021-01-01 RX ADMIN — GRANULES FOR ORAL SOLUTION 3 G: 3 POWDER ORAL at 17:26

## 2021-01-01 RX ADMIN — MIDAZOLAM 2 MG: 1 INJECTION INTRAMUSCULAR; INTRAVENOUS at 08:56

## 2021-01-01 RX ADMIN — FINASTERIDE 5 MG: 5 TABLET, FILM COATED ORAL at 09:07

## 2021-01-01 RX ADMIN — PANTOPRAZOLE SODIUM 40 MG: 40 TABLET, DELAYED RELEASE ORAL at 17:33

## 2021-01-01 RX ADMIN — Medication 10 ML: at 06:17

## 2021-01-01 RX ADMIN — WATER 1 G: 1 INJECTION INTRAMUSCULAR; INTRAVENOUS; SUBCUTANEOUS at 09:08

## 2021-01-01 RX ADMIN — ACETAMINOPHEN 650 MG: 325 TABLET ORAL at 13:04

## 2021-01-01 RX ADMIN — VANCOMYCIN HYDROCHLORIDE 125 MG: 1 INJECTION, POWDER, LYOPHILIZED, FOR SOLUTION INTRAVENOUS at 05:35

## 2021-01-01 RX ADMIN — METRONIDAZOLE 500 MG: 500 INJECTION, SOLUTION INTRAVENOUS at 23:15

## 2021-01-01 RX ADMIN — SODIUM CHLORIDE 100 ML/HR: 900 INJECTION, SOLUTION INTRAVENOUS at 18:25

## 2021-01-01 RX ADMIN — TAMSULOSIN HYDROCHLORIDE 0.4 MG: 0.4 CAPSULE ORAL at 11:07

## 2021-01-01 RX ADMIN — RISPERIDONE 0.5 MG: 0.25 TABLET ORAL at 18:51

## 2021-01-01 RX ADMIN — PANTOPRAZOLE 40 MG: 40 TABLET, DELAYED RELEASE ORAL at 22:00

## 2021-01-01 RX ADMIN — VANCOMYCIN HYDROCHLORIDE 125 MG: 1 INJECTION, POWDER, LYOPHILIZED, FOR SOLUTION INTRAVENOUS at 00:45

## 2021-01-01 RX ADMIN — FINASTERIDE 5 MG: 5 TABLET, FILM COATED ORAL at 09:54

## 2021-01-01 RX ADMIN — ACETAMINOPHEN 650 MG: 325 TABLET ORAL at 12:12

## 2021-01-01 RX ADMIN — ACETAMINOPHEN 650 MG: 325 TABLET ORAL at 21:16

## 2021-01-01 RX ADMIN — ACETAMINOPHEN 650 MG: 325 TABLET ORAL at 10:49

## 2021-01-01 RX ADMIN — PROPRANOLOL HYDROCHLORIDE 10 MG: 10 TABLET ORAL at 11:07

## 2021-01-01 RX ADMIN — TAMSULOSIN HYDROCHLORIDE 0.4 MG: 0.4 CAPSULE ORAL at 09:53

## 2021-01-01 RX ADMIN — Medication 10 ML: at 06:34

## 2021-01-01 RX ADMIN — PROPRANOLOL HYDROCHLORIDE 10 MG: 10 TABLET ORAL at 22:07

## 2021-01-01 RX ADMIN — PANTOPRAZOLE 40 MG: 40 TABLET, DELAYED RELEASE ORAL at 06:57

## 2021-01-01 RX ADMIN — SODIUM CHLORIDE 40 MG: 9 INJECTION, SOLUTION INTRAMUSCULAR; INTRAVENOUS; SUBCUTANEOUS at 19:59

## 2021-01-01 RX ADMIN — VANCOMYCIN HYDROCHLORIDE 125 MG: 1 INJECTION, POWDER, LYOPHILIZED, FOR SOLUTION INTRAVENOUS at 00:51

## 2021-01-01 RX ADMIN — Medication 10 ML: at 13:27

## 2021-01-01 RX ADMIN — VANCOMYCIN HYDROCHLORIDE 125 MG: 1 INJECTION, POWDER, LYOPHILIZED, FOR SOLUTION INTRAVENOUS at 13:34

## 2021-01-01 RX ADMIN — PROPRANOLOL HYDROCHLORIDE 10 MG: 20 TABLET ORAL at 17:21

## 2021-01-01 RX ADMIN — PANTOPRAZOLE SODIUM 40 MG: 40 TABLET, DELAYED RELEASE ORAL at 09:54

## 2021-01-01 RX ADMIN — DEXTROSE MONOHYDRATE AND SODIUM CHLORIDE 75 ML/HR: 5; .9 INJECTION, SOLUTION INTRAVENOUS at 06:41

## 2021-01-01 RX ADMIN — Medication 10 ML: at 22:02

## 2021-01-01 RX ADMIN — PANTOPRAZOLE SODIUM 40 MG: 40 TABLET, DELAYED RELEASE ORAL at 17:12

## 2021-01-01 RX ADMIN — PROPRANOLOL HYDROCHLORIDE 10 MG: 20 TABLET ORAL at 09:07

## 2021-01-01 RX ADMIN — ENOXAPARIN SODIUM 30 MG: 30 INJECTION SUBCUTANEOUS at 09:59

## 2021-01-01 RX ADMIN — TAMSULOSIN HYDROCHLORIDE 0.4 MG: 0.4 CAPSULE ORAL at 08:08

## 2021-01-01 RX ADMIN — TAMSULOSIN HYDROCHLORIDE 0.4 MG: 0.4 CAPSULE ORAL at 09:00

## 2021-01-01 RX ADMIN — TAMSULOSIN HYDROCHLORIDE 0.4 MG: 0.4 CAPSULE ORAL at 13:29

## 2021-01-01 RX ADMIN — Medication 1 CAPSULE: at 09:54

## 2021-01-01 RX ADMIN — ENOXAPARIN SODIUM 30 MG: 30 INJECTION SUBCUTANEOUS at 09:08

## 2021-01-01 RX ADMIN — HEPARIN SODIUM 5000 UNITS: 5000 INJECTION INTRAVENOUS; SUBCUTANEOUS at 00:04

## 2021-01-01 RX ADMIN — SODIUM CHLORIDE 40 MG: 9 INJECTION, SOLUTION INTRAMUSCULAR; INTRAVENOUS; SUBCUTANEOUS at 20:38

## 2021-01-01 RX ADMIN — PANTOPRAZOLE 40 MG: 40 TABLET, DELAYED RELEASE ORAL at 07:00

## 2021-01-01 RX ADMIN — ENOXAPARIN SODIUM 30 MG: 30 INJECTION SUBCUTANEOUS at 11:07

## 2021-01-01 RX ADMIN — SODIUM CHLORIDE 1000 ML: 900 INJECTION, SOLUTION INTRAVENOUS at 23:15

## 2021-01-01 RX ADMIN — HEPARIN SODIUM 5000 UNITS: 5000 INJECTION INTRAVENOUS; SUBCUTANEOUS at 10:18

## 2021-01-01 RX ADMIN — RISPERIDONE 0.5 MG: 0.5 TABLET ORAL at 17:33

## 2021-01-01 RX ADMIN — VANCOMYCIN HYDROCHLORIDE 125 MG: 1 INJECTION, POWDER, LYOPHILIZED, FOR SOLUTION INTRAVENOUS at 17:26

## 2021-01-01 RX ADMIN — FLUCONAZOLE 200 MG: 200 TABLET ORAL at 09:18

## 2021-01-01 RX ADMIN — Medication 10 ML: at 06:24

## 2021-01-01 RX ADMIN — WATER 1 G: 1 INJECTION INTRAMUSCULAR; INTRAVENOUS; SUBCUTANEOUS at 21:41

## 2021-01-01 RX ADMIN — Medication 1 CAPSULE: at 09:00

## 2021-01-01 RX ADMIN — ENOXAPARIN SODIUM 30 MG: 30 INJECTION SUBCUTANEOUS at 09:54

## 2021-01-01 RX ADMIN — MORPHINE SULFATE 1 MG: 2 INJECTION, SOLUTION INTRAMUSCULAR; INTRAVENOUS at 13:20

## 2021-01-01 RX ADMIN — PROPRANOLOL HYDROCHLORIDE 10 MG: 10 TABLET ORAL at 13:30

## 2021-01-01 RX ADMIN — MORPHINE SULFATE 1 MG: 2 INJECTION, SOLUTION INTRAMUSCULAR; INTRAVENOUS at 00:39

## 2021-01-01 RX ADMIN — PROPRANOLOL HYDROCHLORIDE 10 MG: 20 TABLET ORAL at 09:00

## 2021-01-01 RX ADMIN — VANCOMYCIN HYDROCHLORIDE 125 MG: 1 INJECTION, POWDER, LYOPHILIZED, FOR SOLUTION INTRAVENOUS at 12:00

## 2021-01-01 RX ADMIN — Medication 10 ML: at 22:00

## 2021-01-01 RX ADMIN — MORPHINE SULFATE 5 MG: 20 SOLUTION ORAL at 11:30

## 2021-01-01 RX ADMIN — PROPRANOLOL HYDROCHLORIDE 10 MG: 10 TABLET ORAL at 21:41

## 2021-01-01 RX ADMIN — FINASTERIDE 5 MG: 5 TABLET, FILM COATED ORAL at 11:07

## 2021-01-01 RX ADMIN — TAMSULOSIN HYDROCHLORIDE 0.4 MG: 0.4 CAPSULE ORAL at 10:50

## 2021-01-01 RX ADMIN — RISPERIDONE 0.5 MG: 0.5 TABLET ORAL at 20:12

## 2021-01-01 RX ADMIN — Medication 1 CAPSULE: at 09:53

## 2021-01-01 RX ADMIN — VANCOMYCIN HYDROCHLORIDE 125 MG: 1 INJECTION, POWDER, LYOPHILIZED, FOR SOLUTION INTRAVENOUS at 00:39

## 2021-01-01 RX ADMIN — VANCOMYCIN HYDROCHLORIDE 2000 MG: 10 INJECTION, POWDER, LYOPHILIZED, FOR SOLUTION INTRAVENOUS at 12:00

## 2021-01-01 RX ADMIN — SODIUM CHLORIDE, SODIUM LACTATE, POTASSIUM CHLORIDE, AND CALCIUM CHLORIDE 1000 ML: 600; 310; 30; 20 INJECTION, SOLUTION INTRAVENOUS at 03:56

## 2021-01-01 RX ADMIN — RISPERIDONE 0.5 MG: 0.5 TABLET ORAL at 17:12

## 2021-01-01 RX ADMIN — VANCOMYCIN HYDROCHLORIDE 125 MG: 1 INJECTION, POWDER, LYOPHILIZED, FOR SOLUTION INTRAVENOUS at 18:00

## 2021-01-01 RX ADMIN — PROPRANOLOL HYDROCHLORIDE 10 MG: 10 TABLET ORAL at 17:15

## 2021-01-01 RX ADMIN — PROPRANOLOL HYDROCHLORIDE 10 MG: 10 TABLET ORAL at 09:54

## 2021-01-01 RX ADMIN — TAMSULOSIN HYDROCHLORIDE 0.4 MG: 0.4 CAPSULE ORAL at 22:07

## 2021-01-01 RX ADMIN — PROPRANOLOL HYDROCHLORIDE 10 MG: 10 TABLET ORAL at 09:18

## 2021-01-01 RX ADMIN — SODIUM CHLORIDE 1000 ML: 900 INJECTION, SOLUTION INTRAVENOUS at 10:52

## 2021-01-01 RX ADMIN — PROPRANOLOL HYDROCHLORIDE 10 MG: 10 TABLET ORAL at 09:07

## 2021-01-01 RX ADMIN — FINASTERIDE 5 MG: 5 TABLET, FILM COATED ORAL at 09:53

## 2021-01-01 RX ADMIN — IOPAMIDOL 60 ML: 612 INJECTION, SOLUTION INTRAVENOUS at 06:29

## 2021-01-01 RX ADMIN — TAMSULOSIN HYDROCHLORIDE 0.4 MG: 0.4 CAPSULE ORAL at 09:18

## 2021-01-01 RX ADMIN — PANTOPRAZOLE 40 MG: 40 TABLET, DELAYED RELEASE ORAL at 22:15

## 2021-01-01 RX ADMIN — Medication 1 CAPSULE: at 09:17

## 2021-01-01 RX ADMIN — Medication 10 ML: at 06:57

## 2021-01-01 RX ADMIN — Medication 10 ML: at 17:26

## 2021-01-01 RX ADMIN — HEPARIN SODIUM 5000 UNITS: 5000 INJECTION INTRAVENOUS; SUBCUTANEOUS at 10:49

## 2021-01-01 RX ADMIN — FLUCONAZOLE 200 MG: 200 TABLET ORAL at 10:18

## 2021-01-01 RX ADMIN — VANCOMYCIN HYDROCHLORIDE 125 MG: 1 INJECTION, POWDER, LYOPHILIZED, FOR SOLUTION INTRAVENOUS at 12:13

## 2021-01-01 RX ADMIN — VANCOMYCIN HYDROCHLORIDE 125 MG: 1 INJECTION, POWDER, LYOPHILIZED, FOR SOLUTION INTRAVENOUS at 01:01

## 2021-01-01 RX ADMIN — PROPRANOLOL HYDROCHLORIDE 10 MG: 10 TABLET ORAL at 09:53

## 2021-01-01 RX ADMIN — HEPARIN SODIUM 5000 UNITS: 5000 INJECTION INTRAVENOUS; SUBCUTANEOUS at 00:38

## 2021-01-01 RX ADMIN — WATER 1 G: 1 INJECTION INTRAMUSCULAR; INTRAVENOUS; SUBCUTANEOUS at 22:08

## 2021-01-01 RX ADMIN — TAMSULOSIN HYDROCHLORIDE 0.4 MG: 0.4 CAPSULE ORAL at 09:07

## 2021-01-01 RX ADMIN — Medication 10 ML: at 21:54

## 2021-01-01 RX ADMIN — SODIUM CHLORIDE 40 MG: 9 INJECTION, SOLUTION INTRAMUSCULAR; INTRAVENOUS; SUBCUTANEOUS at 00:42

## 2021-01-01 RX ADMIN — HEPARIN SODIUM 5000 UNITS: 5000 INJECTION INTRAVENOUS; SUBCUTANEOUS at 17:52

## 2021-01-01 RX ADMIN — PROPRANOLOL HYDROCHLORIDE 10 MG: 20 TABLET ORAL at 18:51

## 2021-01-01 RX ADMIN — Medication 10 ML: at 21:41

## 2021-01-01 RX ADMIN — CEFEPIME HYDROCHLORIDE 2 G: 2 INJECTION, POWDER, FOR SOLUTION INTRAVENOUS at 18:49

## 2021-01-01 RX ADMIN — Medication 10 ML: at 14:00

## 2021-01-01 RX ADMIN — WATER 1 G: 1 INJECTION INTRAMUSCULAR; INTRAVENOUS; SUBCUTANEOUS at 09:00

## 2021-01-01 RX ADMIN — IPRATROPIUM BROMIDE AND ALBUTEROL SULFATE 3 ML: .5; 3 SOLUTION RESPIRATORY (INHALATION) at 17:15

## 2021-01-01 RX ADMIN — FINASTERIDE 5 MG: 5 TABLET, FILM COATED ORAL at 09:00

## 2021-01-01 RX ADMIN — FINASTERIDE 5 MG: 5 TABLET, FILM COATED ORAL at 13:32

## 2021-01-01 RX ADMIN — Medication 10 ML: at 06:41

## 2021-01-01 RX ADMIN — PANTOPRAZOLE SODIUM 40 MG: 40 TABLET, DELAYED RELEASE ORAL at 09:07

## 2021-01-01 RX ADMIN — FINASTERIDE 5 MG: 5 TABLET, FILM COATED ORAL at 10:50

## 2021-01-01 RX ADMIN — PROPRANOLOL HYDROCHLORIDE 10 MG: 10 TABLET ORAL at 10:49

## 2021-01-01 RX ADMIN — VANCOMYCIN HYDROCHLORIDE 125 MG: 1 INJECTION, POWDER, LYOPHILIZED, FOR SOLUTION INTRAVENOUS at 18:43

## 2021-01-01 RX ADMIN — WATER 1 G: 1 INJECTION INTRAMUSCULAR; INTRAVENOUS; SUBCUTANEOUS at 08:08

## 2021-01-01 RX ADMIN — PANTOPRAZOLE SODIUM 40 MG: 40 TABLET, DELAYED RELEASE ORAL at 17:26

## 2021-01-01 RX ADMIN — SODIUM CHLORIDE 1000 ML: 900 INJECTION, SOLUTION INTRAVENOUS at 09:30

## 2021-01-01 RX ADMIN — FINASTERIDE 5 MG: 5 TABLET, FILM COATED ORAL at 08:08

## 2021-01-01 RX ADMIN — Medication 10 ML: at 22:22

## 2021-01-01 RX ADMIN — PANTOPRAZOLE SODIUM 40 MG: 40 TABLET, DELAYED RELEASE ORAL at 11:07

## 2021-01-01 RX ADMIN — Medication 10 ML: at 05:28

## 2021-01-01 RX ADMIN — WATER 1 G: 1 INJECTION INTRAMUSCULAR; INTRAVENOUS; SUBCUTANEOUS at 21:58

## 2021-01-01 RX ADMIN — TAMSULOSIN HYDROCHLORIDE 0.4 MG: 0.4 CAPSULE ORAL at 15:32

## 2021-01-01 RX ADMIN — VANCOMYCIN HYDROCHLORIDE 125 MG: 1 INJECTION, POWDER, LYOPHILIZED, FOR SOLUTION INTRAVENOUS at 18:21

## 2021-01-01 RX ADMIN — PROPRANOLOL HYDROCHLORIDE 10 MG: 20 TABLET ORAL at 18:23

## 2021-01-01 RX ADMIN — PANTOPRAZOLE 40 MG: 40 TABLET, DELAYED RELEASE ORAL at 06:16

## 2021-01-01 RX ADMIN — VANCOMYCIN HYDROCHLORIDE 125 MG: 1 INJECTION, POWDER, LYOPHILIZED, FOR SOLUTION INTRAVENOUS at 05:11

## 2021-01-01 RX ADMIN — VANCOMYCIN HYDROCHLORIDE 125 MG: 1 INJECTION, POWDER, LYOPHILIZED, FOR SOLUTION INTRAVENOUS at 05:15

## 2021-01-01 RX ADMIN — VANCOMYCIN HYDROCHLORIDE 750 MG: 750 INJECTION, POWDER, LYOPHILIZED, FOR SOLUTION INTRAVENOUS at 08:06

## 2021-01-01 RX ADMIN — VANCOMYCIN HYDROCHLORIDE 125 MG: 1 INJECTION, POWDER, LYOPHILIZED, FOR SOLUTION INTRAVENOUS at 06:34

## 2021-01-01 RX ADMIN — RISPERIDONE 0.5 MG: 0.25 TABLET ORAL at 18:23

## 2021-01-01 RX ADMIN — PROPRANOLOL HYDROCHLORIDE 10 MG: 10 TABLET ORAL at 17:52

## 2021-01-01 RX ADMIN — HEPARIN SODIUM 5000 UNITS: 5000 INJECTION INTRAVENOUS; SUBCUTANEOUS at 01:00

## 2021-01-01 RX ADMIN — ACETAMINOPHEN 650 MG: 325 TABLET ORAL at 09:36

## 2021-01-01 RX ADMIN — FLUCONAZOLE 200 MG: 200 TABLET ORAL at 10:50

## 2021-01-01 RX ADMIN — Medication 1 CAPSULE: at 10:49

## 2021-01-01 RX ADMIN — FINASTERIDE 5 MG: 5 TABLET, FILM COATED ORAL at 22:07

## 2021-01-01 RX ADMIN — RISPERIDONE 0.5 MG: 0.25 TABLET ORAL at 17:21

## 2021-01-01 RX ADMIN — IPRATROPIUM BROMIDE AND ALBUTEROL SULFATE 3 ML: .5; 3 SOLUTION RESPIRATORY (INHALATION) at 00:47

## 2021-01-01 RX ADMIN — LEVOFLOXACIN 750 MG: 5 INJECTION, SOLUTION INTRAVENOUS at 18:51

## 2021-01-01 RX ADMIN — RISPERIDONE 0.5 MG: 0.5 TABLET ORAL at 17:26

## 2021-01-01 RX ADMIN — PROPRANOLOL HYDROCHLORIDE 10 MG: 10 TABLET ORAL at 22:22

## 2021-01-01 RX ADMIN — Medication 10 ML: at 21:59

## 2021-01-01 RX ADMIN — HEPARIN SODIUM 5000 UNITS: 5000 INJECTION INTRAVENOUS; SUBCUTANEOUS at 09:17

## 2021-01-01 RX ADMIN — PANTOPRAZOLE 40 MG: 40 TABLET, DELAYED RELEASE ORAL at 21:54

## 2021-01-01 RX ADMIN — CEFEPIME HYDROCHLORIDE 2 G: 2 INJECTION, POWDER, FOR SOLUTION INTRAVENOUS at 08:06

## 2021-01-01 RX ADMIN — SODIUM CHLORIDE, SODIUM LACTATE, POTASSIUM CHLORIDE, AND CALCIUM CHLORIDE 1000 ML: 600; 310; 30; 20 INJECTION, SOLUTION INTRAVENOUS at 05:26

## 2021-01-01 RX ADMIN — VANCOMYCIN HYDROCHLORIDE 125 MG: 1 INJECTION, POWDER, LYOPHILIZED, FOR SOLUTION INTRAVENOUS at 06:01

## 2021-01-01 RX ADMIN — DEXTROSE MONOHYDRATE AND SODIUM CHLORIDE 75 ML/HR: 5; .9 INJECTION, SOLUTION INTRAVENOUS at 00:53

## 2021-01-01 RX ADMIN — HEPARIN SODIUM 5000 UNITS: 5000 INJECTION INTRAVENOUS; SUBCUTANEOUS at 13:31

## 2021-01-01 RX ADMIN — PROPRANOLOL HYDROCHLORIDE 10 MG: 20 TABLET ORAL at 08:08

## 2021-01-01 RX ADMIN — DEXTROSE MONOHYDRATE AND SODIUM CHLORIDE 75 ML/HR: 5; .9 INJECTION, SOLUTION INTRAVENOUS at 16:26

## 2021-01-01 RX ADMIN — FINASTERIDE 5 MG: 5 TABLET, FILM COATED ORAL at 18:51

## 2021-01-01 RX ADMIN — VANCOMYCIN HYDROCHLORIDE 125 MG: 1 INJECTION, POWDER, LYOPHILIZED, FOR SOLUTION INTRAVENOUS at 12:04

## 2021-01-01 RX ADMIN — VANCOMYCIN HYDROCHLORIDE 750 MG: 750 INJECTION, POWDER, LYOPHILIZED, FOR SOLUTION INTRAVENOUS at 12:00

## 2021-01-01 RX ADMIN — WATER 1 G: 1 INJECTION INTRAMUSCULAR; INTRAVENOUS; SUBCUTANEOUS at 14:46

## 2021-01-01 RX ADMIN — PROPRANOLOL HYDROCHLORIDE 10 MG: 10 TABLET ORAL at 21:58

## 2021-01-01 RX ADMIN — ACETAMINOPHEN 1000 MG: 500 TABLET ORAL at 18:45

## 2021-01-01 RX ADMIN — HEPARIN SODIUM 5000 UNITS: 5000 INJECTION INTRAVENOUS; SUBCUTANEOUS at 00:44

## 2021-01-01 RX ADMIN — HEPARIN SODIUM 5000 UNITS: 5000 INJECTION INTRAVENOUS; SUBCUTANEOUS at 17:42

## 2021-01-01 RX ADMIN — VANCOMYCIN HYDROCHLORIDE 125 MG: 1 INJECTION, POWDER, LYOPHILIZED, FOR SOLUTION INTRAVENOUS at 00:04

## 2021-01-01 RX ADMIN — FINASTERIDE 5 MG: 5 TABLET, FILM COATED ORAL at 09:17

## 2021-01-27 NOTE — PROGRESS NOTES
Prior auth placed for the patients pantoprazole and was approved from 12/22/20 to 1/21/22. Copy of approval has been sent to [facility] and to scan.

## 2021-02-18 NOTE — PROGRESS NOTES
Chief Complaint   Patient presents with    Immunization/Injection     [facility] Vaccine #1     Patient presents for the following vaccines: Moderna Coviid Vaccine #1. Patient consent obtained by patient. Approximately 5 minutes after vaccine was administered patient called me into the room and indicated that he was short of breath. I asked if any CP and he said no. No labored breathing. Dr. Willi Matias was called from upstairs and saw patient. Vital as follows: Bp 173/70  Pulse 58 regular rate and rhythm   R 18   O2 97%    Chart reviewed with Dr. Willi Matias and last visit with PCP was a virtual visit in May 13, 2020 and last in office visit 11/8/18. Patient reported to feeling better and the shortness of breath was getting better. Called to PCP and they are unable to see patient in office to review and go over blood pressure here in the office. Rajendra Campa indicated that she would take a message and get it to [employer] and make sure Dr. Madi Felix is aware. Vitals take for repeat as follow:    /63  Pulse 57  Resp 18 RR  O2 98     Dr. Willi Matias notified and gave verbal ok for patient to leave and follow-up with PCP as discussed.     Lot# [id number]  Exp: 03//20/21  MFG:[id number]  NDC: 60914-360-50    PATIENT IS TO BE A 30 MINUTE OBSERVATION FOR SECOND VACCINE!!!!!

## 2021-02-24 NOTE — PROGRESS NOTES
Consent: German Lunsford, who was seen by synchronous (real-time) audio-video technology, and/or his healthcare decision maker, is aware that this patient-initiated, Telehealth encounter on 2/24/2021 is a billable service, with coverage as determined by his insurance carrier. He is aware that he may receive a bill and has provided verbal consent to proceed: Yes. The patient was at home and I was at the offices of the [address] [place] [address] a home health caretaker 8 also participated in the service. The patient and caretaker state that he is doing well. No true power of  here but patient is declining further work-up. As he appears to be stable at this time we will not press for any further work-up. ICD-10-CM ICD-9-CM    1. Benign essential hypertension  I10 401.1     Adequate blood pressure for his age. Continue present regimen. Follow-up labs before next visit if possible. 2. Parkinson's disease ([facility] [address]  G20 332.0     No major symptoms at this time. No change in medication. 3. Tremor  R25.1 781.0     Not much tremor at this time. We will continue to follow on no treatment. 4. Memory loss  R41.3 780.93     The patient continues to have some memory loss. Clinically not progressive not a behavioral problem. No change in meds   5. .  F41.1 300.00     This is a chronic problem. It is presently well controlled. We will continue the same treatment. lab results and schedule of future lab studies reviewed with patient    ThisThe patient was seen on02/24/21. The issues addressed includedThe primary encounter diagnosis was Benign essential hypertension. Diagnoses of Parkinson's disease ([facility] [address] Tremor, Memory loss, and . were also pertinent to this visit. .. No orders of the defined types were placed in this encounter.      Health Maintenance Due   Topic Date Due    DTaP/Tdap/Td series (1 - Tdap) 09/16/1943    Shingrix Vaccine Age 50> (1 of 2) 09/16/1972    GLAUCOMA SCREENING Q2Y 09/16/1987    Flu Vaccine (1) 09/01/2020   The patient declined the eye appointment and declined immunizations. Subjective:   Moses Montaño is a 80 y.o. male who is being seen in follow-up. .The patient has Hypertrophy of prostate with urinary obstruction and other lower urinary tract symptoms (LUTS), Elevated prostate specific antigen (PSA), Tremor, Dizziness and giddiness, Benign essential hypertension, Anxiety states, Osteoarthrosis involving multiple sites, Depressive disorder, and Memory loss on their problem list..  The patient was last seen by us in May for a Medicare annual wellness visit screening for depression and anxiety states. He was checking on advanced directive. The last visit the patient had in the office was in November 2018 at that time he was seen for primary osteoarthritis there was generalized, unspecified glaucoma, major depressive disorder, single episode. He was also seen for essential hypertension, Parkinson's disease, obstructive and reflux uropathy, personal history of nicotine dependence, anxiety disorder, history of amnesia, BPH  Difficulty with ambulation  The patient uses a walker. He denies tremor. When he holds his hands up no tremors noted. He is able to stand with out assistance onto the walker. There is a history of Parkinson's disease but only minimal symptoms are noted over the video feed. Hypertension  The patient has had no problem with the medication. No recent blood pressure readings. The patient has no headaches, visual changes, chest pain or pressure,dyspnea, orthopnea, abdominal pain, dysuria, weakness, or paresthesias. Osteoarthritis  The patient has a history of osteoarthritis. Today he denies any joint aches although he does admit to stiffness of the shoulders hips and knees. Anxiety/poor memory  The patient denies any increase in anxiety today. Does not know month day or president. There is no history of any side effects from the medications.     Current Outpatient Medications   Medication Sig    risperiDONE (RisperDAL) 0.5 mg tablet take 1 tablet by mouth every evening    pantoprazole (PROTONIX) 40 mg tablet take 1 tablet by mouth once daily    propranoloL (INDERAL) 10 mg tablet take 1 tablet by mouth twice a day    ALPRAZolam (XANAX) 0.25 mg tablet take 1 tablet twice a day if needed for STRESS    memantine (NAMENDA) 5 mg tablet take 1 tablet by mouth twice a day    ubidecarenone (NATURAL CO-Q10 PO) Take 120 mg by mouth daily.  psyllium seed, with dextrose, (FIBER PO) Take 1 Tab by mouth daily.  traZODone (DESYREL) 100 mg tablet take 1/2 to 1 tablet by mouth at bedtime    ascorbic acid (VITAMIN C) 250 mg tablet Take 1 tablet by mouth daily.  psyllium husk-aspartame (METAMUCIL FIBER) 3.4 gram pwpk packet Take 1 packet by mouth daily (after dinner). No current facility-administered medications for this visit. Allergies   Allergen Reactions    Sulfa (Sulfonamide Antibiotics) Other (comments)     Dizziness/ passed out     has Hypertrophy of prostate with urinary obstruction and other lower urinary tract symptoms (LUTS), Elevated prostate specific antigen (PSA), Tremor, Dizziness and giddiness, Benign essential hypertension, Anxiety states, Osteoarthrosis involving multiple sites, Depressive disorder, and Memory loss on their problem list.  Past Surgical History:   Procedure Laterality Date    HX APPENDECTOMY      HX HERNIA REPAIR      HX OTHER SURGICAL      rectal fissure surgery    HX OTHER SURGICAL Bilateral     eye surgery      reports that he has quit smoking. He has never used smokeless tobacco. He reports current alcohol use.  family history includes Hypertension in his maternal grandfather; No Known Problems in his father and mother; Stroke in his maternal grandfather. Review of Systems   Constitutional: Negative for chills, fever and weight loss. HENT: Positive for hearing loss (Moderate. Does not want further work-up). Eyes: Negative for blurred vision. Respiratory: Negative for shortness of breath. Cardiovascular: Negative for chest pain. Gastrointestinal: Negative for constipation, diarrhea and heartburn. Genitourinary: Negative for dysuria. Musculoskeletal: Negative for joint pain and myalgias. Skin: Negative for rash. Neurological: Positive for dizziness. Psychiatric/Behavioral: Positive for depression. The patient is nervous/anxious. 154/101 pulse 101  Physical Exam  Constitutional:       General: He is not in acute distress. Appearance: Normal appearance. HENT:      Right Ear: External ear normal.      Left Ear: External ear normal.      Nose: Nose normal.      Mouth/Throat:      Mouth: Mucous membranes are moist.      Pharynx: Oropharynx is clear. Eyes:      General: No scleral icterus. Extraocular Movements: Extraocular movements intact. Pupils: Pupils are equal, round, and reactive to light. Comments: Eyes look watery   Pulmonary:      Effort: Pulmonary effort is normal.   Abdominal:      General: There is no distension. Musculoskeletal:         General: No swelling. Right lower leg: No edema. Left lower leg: No edema. Skin:     Coloration: Skin is not jaundiced. Findings: No erythema. Neurological:      Mental Status: He is alert and oriented to person, place, and time. Coordination: Coordination normal.      Gait: Gait normal.   Psychiatric:         Mood and Affect: Mood normal.         Behavior: Behavior normal.          We discussed the expected course, resolution and complications of the diagnosis(es) in detail. Medication risks, benefits, costs, interactions, and alternatives were discussed as indicated. I advised him to contact the office if his condition worsens, changes or fails to improve as anticipated. He expressed understanding with the diagnosis(es) and plan.      Juliette Bethea is a 80 y.o. male being evaluated by a video visit encounter for concerns as above. A caregiver was present when appropriate. Due to this being a TeleHealth encounter (During [facility] public health emergency), evaluation of the following organ systems was limited: Vitals/Constitutional/EENT/Resp/CV/GI//MS/Neuro/Skin/Heme-Lymph-Imm. Pursuant to the emergency declaration under the [facility], [facility]5 waiver authority and the [employer] and [employer] Act, this Virtual  Visit was conducted, with patient's (and/or legal guardian's) consent, to reduce the patient's risk of exposure to COVID-19 and provide necessary medical care. This note was done with the assistance of dragon speech software.   Some inadvertent errors or omissions may be present

## 2021-03-18 NOTE — PROGRESS NOTES
Chief Complaint   Patient presents with    Immunization/Injection     Volanda Saucer Vaccine #2     Patient presents for the following vaccines: Moderna Covid Vaccine #2. Patient consent obtained by patient. Patient tolerated procedure well at right deltoid. Patient observed for a period of 30 minutes post injection to ensure no reaction. Lot # [id number]  Exp: 04/18/21  Moderna    Due to patient having minor reaction at first dose, patient was monitored for 30 minutes. Patient did not have any issues other then needing some water.

## 2021-05-21 NOTE — TELEPHONE ENCOUNTER
Lucio Lebron (caregiver) called stating the patient needs a letter stating he is under Dr. Browning Shock care.   Please mail letter when ready

## 2021-06-01 NOTE — TELEPHONE ENCOUNTER
Patient is asking for something for fluid his leg was swelling  Please advise  Milla Paz his caregiver [phone]

## 2021-06-03 NOTE — TELEPHONE ENCOUNTER
We probably need to get him seen by video visit to see if we can figure out why his legs are swelling or up at 1 only.   We can then figure out what to give him for or how to work it up

## 2021-06-04 NOTE — PROGRESS NOTES
Consent: Ignacia Harrison, who was evaluated by audio only technology, and/or his healthcare decision maker, is aware that this patient-initiated, Telehealth encounter on 6/7/2021 is a billable service, with coverage as determined by his insurance carrier. He is aware that he may receive a bill and has provided verbal consent to proceed: Yes. The patient was at home and I was at the offices of the [address] [place] [address] no one else participated in the service. 06/07/21    ICD-10-CM ICD-9-CM    1. Peripheral edema  R60.9 782.3 CBC WITH AUTOMATED DIFF      METABOLIC PANEL, COMPREHENSIVE   2. Elevated brain natriuretic peptide (BNP) level  R79.89 790.99        The patient presents with newly noticed peripheral edema. Symptoms are minimal.  Differential diagnosis includes hepatic, renal, cardiac or other etiologies. Will initiate work-up with lab CBC and CMP. His most recent labs in 2018 show a mild anemia, normal renal function and unremarkable hepatic function. lab results and schedule of future lab studies reviewed with patient  All questions were answered and understood. Health Maintenance Due   Topic Date Due    DTaP/Tdap/Td series (1 - Tdap) Never done    Shingrix Vaccine Age 50> (1 of 2) Never done    Medicare Yearly Exam  05/14/2021       Subjective:   Ignacia Harrison is a 80 y.o. male. has Hypertrophy of prostate with urinary obstruction and other lower urinary tract symptoms (LUTS), Elevated prostate specific antigen (PSA), Tremor, Dizziness and giddiness, Benign essential hypertension, Anxiety states, Osteoarthrosis involving multiple sites, Depressive disorder, and Memory loss on their problem list..  The patient and his caretaker were interviewed by phone conversation. Lower extremity swelling is admitted to. He was first noticed 1 week ago. The patient is not feeling worse. Specifically he has no chest pain or pressure no orthopnea.   There is no known history of kidney or hepatic disease. The urine has not changed in frequency or appearance. The patient does admit to fatigue that cannot further define. No specific change in heat or cold intolerance. Current Outpatient Medications   Medication Sig    propranoloL (INDERAL) 10 mg tablet take 1 tablet by mouth twice a day    pantoprazole (PROTONIX) 40 mg tablet take 1 tablet by mouth once daily    risperiDONE (RisperDAL) 0.5 mg tablet take 1 tablet by mouth every evening    ALPRAZolam (XANAX) 0.25 mg tablet take 1 tablet twice a day if needed for STRESS    memantine (NAMENDA) 5 mg tablet take 1 tablet by mouth twice a day    ubidecarenone (NATURAL CO-Q10 PO) Take 120 mg by mouth daily.  psyllium seed, with dextrose, (FIBER PO) Take 1 Tab by mouth daily.  traZODone (DESYREL) 100 mg tablet take 1/2 to 1 tablet by mouth at bedtime    ascorbic acid (VITAMIN C) 250 mg tablet Take 1 tablet by mouth daily.  psyllium husk-aspartame (METAMUCIL FIBER) 3.4 gram pwpk packet Take 1 packet by mouth daily (after dinner). No current facility-administered medications for this visit. Allergies   Allergen Reactions    Sulfa (Sulfonamide Antibiotics) Other (comments)     Dizziness/ passed out     has Hypertrophy of prostate with urinary obstruction and other lower urinary tract symptoms (LUTS), Elevated prostate specific antigen (PSA), Tremor, Dizziness and giddiness, Benign essential hypertension, Anxiety states, Osteoarthrosis involving multiple sites, Depressive disorder, and Memory loss on their problem list.  Past Surgical History:   Procedure Laterality Date    HX APPENDECTOMY      HX HERNIA REPAIR      HX OTHER SURGICAL      rectal fissure surgery    HX OTHER SURGICAL Bilateral     eye surgery      reports that he has quit smoking.  He has never used smokeless tobacco. He reports current alcohol use.  family history includes Hypertension in his maternal grandfather; No Known Problems in his father and mother; Stroke in his maternal grandfather. Review of Systems   Constitutional: Negative for chills and fever. HENT: Negative for congestion. Respiratory: Negative for shortness of breath. Cardiovascular: Positive for leg swelling. Negative for chest pain, orthopnea and PND. Gastrointestinal: Negative for heartburn. Genitourinary: Negative for dysuria. Musculoskeletal: Negative for myalgias. We discussed the expected course, resolution and complications of the diagnosis(es) in detail. Medication risks, benefits, costs, interactions, and alternatives were discussed as indicated. I advised him to contact the office if his condition worsens, changes or fails to improve as anticipated. He expressed understanding with the diagnosis(es) and plan. Bhanu Caceres is a 80 y.o. male being evaluated by a video visit encounter for concerns as above. A caregiver was present when appropriate. Due to this being a TeleHealth encounter (During [facility]- public health emergency), evaluation of the following organ systems was limited: Vitals/Constitutional/EENT/Resp/CV/GI//MS/Neuro/Skin/Heme-Lymph-Imm. Pursuant to the emergency declaration under the [facility], 1135 waiver authority and the [employer] and [employer] Act, this Virtual  Visit was conducted, with patient's (and/or legal guardian's) consent, to reduce the patient's risk of exposure to COVID-19 and provide necessary medical care. This note was done with the assistance of dragon speech software.   Some inadvertent errors or omissions may be present

## 2021-08-27 PROBLEM — R50.9 FEVER: Status: ACTIVE | Noted: 2021-01-01

## 2021-08-27 PROBLEM — R33.9 URINARY RETENTION: Status: ACTIVE | Noted: 2021-01-01

## 2021-08-27 PROBLEM — A41.9 SEPSIS (HCC): Status: ACTIVE | Noted: 2021-01-01

## 2021-08-27 PROBLEM — D72.829 LEUKOCYTOSIS: Status: ACTIVE | Noted: 2021-01-01

## 2021-08-27 PROBLEM — N39.0 UTI (URINARY TRACT INFECTION): Status: ACTIVE | Noted: 2021-01-01

## 2021-08-27 NOTE — ED PROVIDER NOTES
Patient is a 80-year-old male with history of hypertension, BPH, and anxiety. Patient presents today with primary complaint of increasing generalized weakness over the last 2 to 3 days. Patient denies any other complaints, no associated shortness of breath, abdominal pain, nausea/vomiting, diarrhea, chest pain, or syncope.            Past Medical History:   Diagnosis Date    Allergy     Anxiety states     Arthritis     Benign essential hypertension     Depressive disorder     Diverticulitis     Dizziness and giddiness     Elevated prostate specific antigen (PSA)     Glaucoma     Gross hematuria     Hypertrophy of prostate with urinary obstruction and other lower urinary tract symptoms (LUTS)     Nocturia     Osteoarthrosis involving multiple sites     Tremor        Past Surgical History:   Procedure Laterality Date    HX APPENDECTOMY      HX HERNIA REPAIR      HX OTHER SURGICAL      rectal fissure surgery    HX OTHER SURGICAL Bilateral     eye surgery         Family History:   Problem Relation Age of Onset    No Known Problems Mother     No Known Problems Father     Hypertension Maternal Grandfather     Stroke Maternal Grandfather        Social History     Socioeconomic History    Marital status: SINGLE     Spouse name: Not on file    Number of children: Not on file    Years of education: Not on file    Highest education level: Not on file   Occupational History    Not on file   Tobacco Use    Smoking status: Former Smoker    Smokeless tobacco: Never Used   Substance and Sexual Activity    Alcohol use: Yes     Comment: occasional    Drug use: Not on file    Sexual activity: Not on file   Other Topics Concern    Not on file   Social History Narrative    Not on file     Social Determinants of Health     Financial Resource Strain:     Difficulty of Paying Living Expenses:    Food Insecurity:     Worried About Running Out of Food in the Last Year:     Ran Out of Food in the Last Year:    Transportation Needs:     Lack of Transportation (Medical):  Lack of Transportation (Non-Medical):    Physical Activity:     Days of Exercise per Week:     Minutes of Exercise per Session:    Stress:     Feeling of Stress :    Social Connections:     Frequency of Communication with Friends and Family:     Frequency of Social Gatherings with Friends and Family:     Attends Orthodoxy Services:     Active Member of Clubs or Organizations:     Attends Club or Organization Meetings:     Marital Status:    Intimate Partner Violence:     Fear of Current or Ex-Partner:     Emotionally Abused:     Physically Abused:     Sexually Abused: ALLERGIES: Sulfa (sulfonamide antibiotics)    Review of Systems   Constitutional: Negative for chills and fever. HENT: Negative for rhinorrhea and sore throat. Eyes: Negative for discharge and redness. Respiratory: Negative for cough and shortness of breath. Cardiovascular: Negative for chest pain and leg swelling. Gastrointestinal: Negative for abdominal pain, diarrhea, nausea and vomiting. Genitourinary: Negative for difficulty urinating and dysuria. Musculoskeletal: Negative for back pain and neck pain. Skin: Negative for rash and wound. Neurological: Positive for weakness. Negative for syncope, light-headedness and headaches. Vitals:    08/27/21 0330 08/27/21 0345 08/27/21 0400 08/27/21 0415   BP: (!) 115/56 104/62 (!) 119/96    Pulse:       Resp:       Temp:       SpO2: 95% 95% 95%    Weight:    66.7 kg (147 lb)   Height:    5' 10\" (1.778 m)            Physical Exam  Constitutional:       General: He is not in acute distress. Appearance: He is not ill-appearing, toxic-appearing or diaphoretic. HENT:      Head: Normocephalic and atraumatic. Right Ear: External ear normal.      Left Ear: External ear normal.      Nose: No congestion or rhinorrhea.       Mouth/Throat:      Mouth: Mucous membranes are moist. Pharynx: No oropharyngeal exudate or posterior oropharyngeal erythema. Eyes:      General:         Right eye: No discharge. Left eye: No discharge. Pupils: Pupils are equal, round, and reactive to light. Neck:      Vascular: No carotid bruit. Cardiovascular:      Rate and Rhythm: Normal rate and regular rhythm. Heart sounds: No murmur heard. No friction rub. No gallop. Pulmonary:      Effort: Pulmonary effort is normal. No respiratory distress. Breath sounds: No stridor. No wheezing, rhonchi or rales. Abdominal:      General: Abdomen is flat. There is no distension. Tenderness: There is abdominal tenderness in the right upper quadrant, right lower quadrant and epigastric area. There is no right CVA tenderness, left CVA tenderness, guarding or rebound. Musculoskeletal:         General: No swelling, tenderness, deformity or signs of injury. Cervical back: No rigidity or tenderness. Right lower leg: No edema. Left lower leg: No edema. Lymphadenopathy:      Cervical: No cervical adenopathy. Skin:     General: Skin is warm. Capillary Refill: Capillary refill takes less than 2 seconds. Coloration: Skin is not jaundiced or pale. Findings: No bruising, erythema, lesion or rash. Neurological:      General: No focal deficit present. Mental Status: He is alert. Sensory: No sensory deficit. Motor: No weakness. Comments: Oriented to self and location only, at baseline per caregiver. Psychiatric:         Mood and Affect: Mood normal.          MDM  Number of Diagnoses or Management Options  Diagnosis management comments: Patient presents febrile with generalized weakness and significant abdominal tenderness on exam, will proceed with a septic work-up including blood cultures, chest x-ray, empiric antibiotics and CT imaging of the abdomen with disposition likely admission.        Amount and/or Complexity of Data Reviewed  Clinical lab tests: reviewed  Tests in the radiology section of CPT®: reviewed  Tests in the medicine section of CPT®: reviewed  Decide to obtain previous medical records or to obtain history from someone other than the patient: yes           Procedures

## 2021-08-27 NOTE — PROCEDURES
[address] [facility], [address] [facility]  Tel: [phone]         Procedure Note  8/27/2021      [facility] has been consulted by Dr Abhishek Marin for difficult dove catheter placement after unsuccessful attempts by nursing staff. Rony Sandhu has been admitted for DALLIN, acute urinary retentions and gross hematuria and requires dove catheter placement. Unable to place coude due to false passage. Procedure: The patient was draped and prepped in the usual sterile manner. Urethral meatus was cleaned with betadine and 10cc of Lidocane gel was instilled in the urethral meatus/urethra. Bedside cystoscopy performed by Dr. Narayanan Favor and able to guide pass the false passage near the prostate and into the bladder. A guide-wire was then passed through into bladder and then a 16 Fr [place] dove catheter was then passed over wire and into the urethra/urethral meatus and advanced into the bladder with an immediate return of gross hematuria that quickly cleared to yellow urine. The wire was then removed and the dove catheter balloon was then inflated with 10 cc of sterile water and the dove drainage bag was put to gravity. The patient tolerated the procedure well. Thank you for allowing us to participate in the care of Rony Sandhu. If further assistance with this patient is required, please feel free to contact us as needed. Ameena Luna NP-BC  [facility]   Pager [phone]    I was present for and performed the entire procedure. See NP's note for details. (Minor procedure <5 minutes).      Kristina Morales MD

## 2021-08-27 NOTE — CONSULTS
No diagnosis found. ASSESSMENT:   Hx BPH, AUR, gross hematuria   Difficult dove placement after multiple failed attempts    S/p bedside cysto, guide-wire and dove placement by Dr. Cindi Bumpers    WBC 14.4   Hgb 11.0    DALLIN   Creat 1.7   Baseline Creat 1.2 in 2018    Presented to the ED for weakness and AMS    PLAN:    Dove placed with bedside cysto, catheter over guide-wire  Urine draining well, yellow with blood streaks   No need for CBI at this time  CT reviewed, known BPH  Maintain catheter, Do not remove at discharge   Send urine for culture, abx per primary   Continue Flomax  Ordered Finasteride   Irrigate dove Qshift and PRN clots/decreased output  Following peripherally         Follow up arranged? Patient will need an outpatient voiding trial and cystoscopy for BPH, [facility] workup, msg sent       Jessica Gonzalez NP-BC  [facility]   Pager [phone]    August 27, 2021 1:15 PM    I saw and evaluated the patient, performing the key elements of the service. I discussed the findings, assessment and plan with the NP and agree with the NP's findings and plan as documented in the NP's note. Mendez Dodge MD       Chief Complaint   Patient presents with    Fatigue       HISTORY OF PRESENT ILLNESS:  Kelvin Castillo is a 80 y.o. male who is seen in consultation as referred by Dr. Cindy Ford  for acute urinary retenion and inability to place dove catheter. Patient has a significant hx of BPH and had multiple failed  attempts from nursing to place catheter and I was unable to place coude due to suspected false passage. (See procedure note)  Bedside cystoscopy performed with guided-wire and 16 [place] dove placed by Dr Cindi Bumpers. Patient had some gross hematuria but urine cleared quickly to yellow. Patient arrived with fever of 101.8 and a suspected UTI. WBC is elevated and DALLIN is present. Patient complained of some abdominal discomfort prior to dove placement but no other symptoms. Patient has a  history of BPH and elevated PSA and has seen [facility] in the past, last office visit was 6/2012 with Dr Fazal Yuen. Caregiver at bedside and states patient has never had urinary retention as far as she is aware. No flowsheet data found.       Past Medical History:   Diagnosis Date    Allergy     Anxiety states     Arthritis     Benign essential hypertension     Depressive disorder     Diverticulitis     Dizziness and giddiness     Elevated prostate specific antigen (PSA)     Glaucoma     Gross hematuria     Hypertrophy of prostate with urinary obstruction and other lower urinary tract symptoms (LUTS)     Nocturia     Osteoarthrosis involving multiple sites     Tremor        Past Surgical History:   Procedure Laterality Date    HX APPENDECTOMY      HX HERNIA REPAIR      HX OTHER SURGICAL      rectal fissure surgery    HX OTHER SURGICAL Bilateral     eye surgery       Social History     Tobacco Use    Smoking status: Former Smoker    Smokeless tobacco: Never Used   Substance Use Topics    Alcohol use: Yes     Comment: occasional    Drug use: Not on file       Allergies   Allergen Reactions    Sulfa (Sulfonamide Antibiotics) Other (comments)     Dizziness/ passed out       Family History   Problem Relation Age of Onset    No Known Problems Mother     No Known Problems Father     Hypertension Maternal Grandfather     Stroke Maternal Grandfather        Current Outpatient Medications   Medication Sig Dispense Refill    risperiDONE (RisperDAL) 0.5 mg tablet take 1 tablet by mouth every evening 120 Tablet 3    propranoloL (INDERAL) 10 mg tablet take 1 tablet by mouth twice a day 180 Tab 3    pantoprazole (PROTONIX) 40 mg tablet take 1 tablet by mouth once daily 120 Tab 3    ALPRAZolam (XANAX) 0.25 mg tablet take 1 tablet twice a day if needed for STRESS 60 Tab 2    memantine (NAMENDA) 5 mg tablet take 1 tablet by mouth twice a day 60 Tab 5    ubidecarenone (NATURAL CO-Q10 PO) Take 120 mg by mouth daily.  psyllium seed, with dextrose, (FIBER PO) Take 1 Tab by mouth daily.  traZODone (DESYREL) 100 mg tablet take 1/2 to 1 tablet by mouth at bedtime 30 Tab 0    ascorbic acid (VITAMIN C) 250 mg tablet Take 1 tablet by mouth daily. 30 tablet 0    psyllium husk-aspartame (METAMUCIL FIBER) 3.4 gram pwpk packet Take 1 packet by mouth daily (after dinner). 30 packet 0       Review of Systems  ROS is:     Not obtainable due to patient factors- confused, AMS    Negative for: Ophthalmologic issues, ENT issues, Cardiovascular issues, respiratory issues, GI issues, neurologic issues, hematoogic issues, skin lesions, musculoskeletal issues, psychiatric issues  Exceptions: yes    Positive for:    Urinary retention, abd discomfort               PHYSICAL EXAMINATION:   Visit Vitals  BP (!) 169/141   Pulse (!) 104   Temp (!) 101.8 °F (38.8 °C)   Resp 20   Ht 5' 10\" (1.778 m)   Wt 66.7 kg (147 lb)   SpO2 99%   BMI 21.09 kg/m²     Constitutional: Ellderly, well nourished male. No acute distress. HEENT: Normocephalic, Atraumatic, EOM's intact   CV:  no edema  Respiratory: No respiratory distress or difficulties breathing   Abdomen:  Distended and tender on palpation    Male:  No CVA tenderness  SCROTUM:  No scrotal rash or lesions noticed. Normal bilateral testes and epididymis. PENIS: Urethral meatus normal in location and size. No urethral discharge. Circumsized   16 Fr [place] dove placed over wired with cysto. Urine yellow with blood streaks   Skin: No evidence of jaundice. Normal color  Neuro/Psych:  Alert and oriented. Affect appropriate. REVIEW OF LABS AND IMAGING:      CT A/P 8/27  IMPRESSION  1. Distended gallbladder. No clear inflammatory change. However, if continued  clinical concern, consider nuclear medicine hepatobiliary scan or ultrasound for  further evaluation.     2. Short segment narrowing of the colon with thick wall.  This is nonspecific  but because represent a colonic lesion. Further evaluation with colonoscopy is  recommended.     3.  Prostatic hypertrophy.     4.  Mild prominence of the collecting system bilaterally which may represent  some element of bladder outlet issues. Labs: Results:   Chemistry    Recent Labs     08/27/21 0330   GLU 99   *   K 4.1      CO2 25   BUN 43*   CREA 1.72*   CA 8.3*   AGAP 6   BUCR 25*      TP 8.0   ALB 3.3*   GLOB 4.7*   AGRAT 0.7*      CBC w/Diff Recent Labs     08/27/21 0330   WBC 14.4*   RBC 3.59*   HGB 11.0*   HCT 32.8*      GRANS 87*   LYMPH 7*   EOS 0      Cultures Recent Labs     08/27/21 0330 08/27/21 0315   CULT NO GROWTH AFTER 1 HOUR NO GROWTH AFTER 1 HOUR     All Micro Results     Procedure Component Value Units Date/Time    COVID-19 RAPID TEST [[id number]] Collected: 08/27/21 0849    Order Status: Completed Specimen: Nasopharyngeal Updated: 08/27/21 0912     Specimen source Nasopharyngeal        COVID-19 rapid test Not detected        Comment: Rapid Abbott ID Now       Rapid NAAT:  The specimen is NEGATIVE for SARS-CoV-2, the novel coronavirus associated with COVID-19. Negative results should be treated as presumptive and, if inconsistent with clinical signs and symptoms or necessary for patient management, should be tested with an alternative molecular assay. Negative results do not preclude SARS-CoV-2 infection and should not be used as the sole basis for patient management decisions. This test has been authorized by the FDA under an Emergency Use Authorization (EUA) for use by authorized laboratories.    Fact sheet for Healthcare Providers: ConventionUpdate.co.nz  Fact sheet for Patients: ConventionUpdate.co.nz       Methodology: Isothermal Nucleic Acid Amplification         CULTURE, BLOOD [[id number]] Collected: 08/27/21 0315    Order Status: Completed Specimen: Blood Updated: 08/27/21 5647     Special Requests: NO SPECIAL REQUESTS        Culture result: NO GROWTH AFTER 1 HOUR       CULTURE, BLOOD [[id number]] Collected: 08/27/21 0330    Order Status: Completed Specimen: Blood Updated: 08/27/21 0643     Special Requests: NO SPECIAL REQUESTS        Culture result: NO GROWTH AFTER 1 HOUR               Urinalysis Potassium   Date Value Ref Range Status   08/27/2021 4.1 3.5 - 5.5 mmol/L Final     Creatinine   Date Value Ref Range Status   08/27/2021 1.72 (H) 0.6 - 1.3 MG/DL Final     BUN   Date Value Ref Range Status   08/27/2021 43 (H) 7.0 - 18 MG/DL Final     Prostate Specific Ag   Date Value Ref Range Status   11/25/2011 9.9 (H) 0.0 - 4.0 ng/mL Final     Comment:     Roche ECLIA methodology. According to the American Urological Association, Serum PSA should  decrease and remain at undetectable levels after radical  prostatectomy. The AUA defines biochemical recurrence as an initial  PSA value 0.2 ng/mL or greater followed by a subsequent confirmatory  PSA value 0.2 ng/mL or greater. Values obtained with different assay methods or kits cannot be used  interchangeably. Results cannot be interpreted as absolute evidence  of the presence or absence of malignant disease. Performed At: [address], [address] [place] [address]  [id number]  Olivia Mcmillan MD  [id number]      PSA No results for input(s): PSA in the last 72 hours.    Coagulation Lab Results   Component Value Date/Time    Prothrombin time 13.5 09/29/2014 05:53 PM    Prothrombin time 12.4 09/18/2012 03:27 PM    INR 1.0 09/29/2014 05:53 PM    INR 0.9 09/18/2012 03:27 PM

## 2021-08-27 NOTE — PROGRESS NOTES
conducted an initial consultation and Spiritual Assessment for Kun Abarca, who is a 80 y. o.,male. Patient's Primary Language is: [place]. According to the patient's EMR Judaism Affiliation is: No Hindu. The reason the Patient came to the hospital is:   Patient Active Problem List    Diagnosis Date Noted    Memory loss 11/08/2015    Tremor     Dizziness and giddiness     Benign essential hypertension     Anxiety states     Osteoarthrosis involving multiple sites     Depressive disorder     Hypertrophy of prostate with urinary obstruction and other lower urinary tract symptoms (LUTS)     Elevated prostate specific antigen (PSA)         The  provided the following Interventions:  Initiated a relationship of care and support. Provided chaplaincy education. Provided information about Spiritual Care Services. Offered prayer and assurance of continued prayers on patient's behalf. Chart reviewed. The following outcomes where achieved:  Care-Taker expressed gratitude for 's visit. Assessment:  There are no spiritual or Mormon issues which require intervention at this time. Plan:  Chaplains will continue to follow and will provide pastoral care on an as needed/requested basis.  recommends bedside caregivers page  on duty if patient shows signs of acute spiritual or emotional distress.     [facility] [address]   [phone]

## 2021-08-27 NOTE — H&P
History & Physical    Patient: Gordon Dubon MRN: [id number]  CSN: [id number]    YOB: 1922  Age: 80 y.o. Sex: male      DOA: 8/27/2021  CC: not eating, no drinking, feels weak    PCP: Juan J Magallanes MD       HPI:     Gordon Dubon is a 80 y.o. male with medical co-morbidities including HTN, depression, tremor, advanced age, presented from home due to concern for not eating/drinking and generalized fatigue in the last 2 days. He stated he does not feels well. His caretaker at bedside confirmed that he has not as active as he was. He has eating less and less talkative. He has been laying in bed and not doing much. No report of fever/chill at home. No sick contact. In the ER, he was found to have fever, leukocytosis, DALLIN, he was given IV fluid and IV antibiotics. He had acute urinary retention which required Urology consult for dove placement. CT abd/pelv without a clear evidence of pathology but gallbladder distended without inflammatory change, nonspecific colon finding. Review of Systems:   GENERAL: No fever, + chill, + malaise   HEENT: No change in vision, no ear ache, no sore throat or sinus congestion. NECK: No pain or stiffness. PULMONARY: No shortness of breath, no cough or wheeze. Cardiovascular: no pnd / orthopnea, no Chest Pain  GASTROINTESTINAL: No abd pain, No nausea/vomiting, No diarrhea, No bright red blood per rectum. GENITOURINARY: No urinary frequency, No urgency or pain with urination. MUSCULOSKELETAL: No joint or muscle pain, no back pain, no recent trauma. DERMATOLOGIC: No rash, no itching, no lesions. ENDOCRINE: No polyuria, polydipsia,  No recent change in weight. HEMATOLOGICAL: No easy bruising or bleeding.    NEUROLOGIC: No headache, No seizures, No generalized weakness         Past Medical History:   Diagnosis Date    Allergy     Anxiety states     Arthritis     Benign essential hypertension     Depressive disorder     Diverticulitis     Dizziness and giddiness     Elevated prostate specific antigen (PSA)     Glaucoma     Gross hematuria     Hypertrophy of prostate with urinary obstruction and other lower urinary tract symptoms (LUTS)     Nocturia     Osteoarthrosis involving multiple sites     Tremor        Past Surgical History:   Procedure Laterality Date    HX APPENDECTOMY      HX HERNIA REPAIR      HX OTHER SURGICAL      rectal fissure surgery    HX OTHER SURGICAL Bilateral     eye surgery       Family History   Problem Relation Age of Onset    No Known Problems Mother     No Known Problems Father     Hypertension Maternal Grandfather     Stroke Maternal Grandfather        Social History     Socioeconomic History    Marital status: SINGLE     Spouse name: Not on file    Number of children: Not on file    Years of education: Not on file    Highest education level: Not on file   Tobacco Use    Smoking status: Former Smoker    Smokeless tobacco: Never Used   Substance and Sexual Activity    Alcohol use: Yes     Comment: occasional     Social Determinants of Health     Financial Resource Strain:     Difficulty of Paying Living Expenses:    Food Insecurity:     Worried About Running Out of Food in the Last Year:     Ran Out of Food in the Last Year:    Transportation Needs:     Lack of Transportation (Medical):  Lack of Transportation (Non-Medical):    Physical Activity:     Days of Exercise per Week:     Minutes of Exercise per Session:    Stress:     Feeling of Stress :    Social Connections:     Frequency of Communication with Friends and Family:     Frequency of Social Gatherings with Friends and Family:     Attends Denominational Services:     Active Member of Clubs or Organizations:     Attends Club or Organization Meetings:     Marital Status:        Prior to Admission medications    Medication Sig Start Date End Date Taking?  Authorizing Provider   risperiDONE (RisperDAL) 0.5 mg tablet take 1 tablet by mouth every evening 7/20/21   Dion Medina MD   propranoloL (INDERAL) 10 mg tablet take 1 tablet by mouth twice a day 4/17/21   Dion Medina MD   pantoprazole (PROTONIX) 40 mg tablet take 1 tablet by mouth once daily 4/17/21   Radha Arellano MD   ALPRAZolam [facility]) 0.25 mg tablet take 1 tablet twice a day if needed for STRESS 6/5/19   Sarina Dancer, MD   memantine [facility]) 5 mg tablet take 1 tablet by mouth twice a day 1/23/19   Sarina Dancer, MD   ubidecarenone (NATURAL CO-Q10 PO) Take 120 mg by mouth daily. Provider, Historical   psyllium seed, with dextrose, (FIBER PO) Take 1 Tab by mouth daily. Provider, Historical   traZODone (DESYREL) 100 mg tablet take 1/2 to 1 tablet by mouth at bedtime 1/3/16   Sarina Dancer, MD   ascorbic acid (VITAMIN C) 250 mg tablet Take 1 tablet by mouth daily. 10/2/14   Dimitry Aragon MD   psyllium husk-aspartame (METAMUCIL FIBER) 3.4 gram pwpk packet Take 1 packet by mouth daily (after dinner).  10/1/14   Dimitry Aragon MD       Allergies   Allergen Reactions    Sulfa (Sulfonamide Antibiotics) Other (comments)     Dizziness/ passed out              Physical Exam:      Visit Vitals  BP (!) 169/141   Pulse (!) 104   Temp (!) 101.8 °F (38.8 °C)   Resp 20   Ht 5' 10\" (1.778 m)   Wt 66.7 kg (147 lb)   SpO2 99%   BMI 21.09 kg/m²       Physical Exam:  Tele:   General:  Cooperative, Not in acute distress, speaks in short sentence while in bed  cachexia  HEENT: PERRL, EOMI, supple neck, no JVD, dry oral mucosa  Cardiovascular: S1S2 regular, no rub/gallop   Pulmonary: Air entry bilaterally, no wheezing, no crackle  GI:  Soft, non tender, non distended, +bs, no guarding   Extremities:  No pedal edema, +distal pulses appreciated   Neuro: AOx3, moving all extremities      Lab/Data Review:  Labs: Results:       Chemistry Recent Labs     08/27/21  0330   GLU 99   *   K 4.1      CO2 25   BUN 43*   CREA 1.72*   CA 8.3*   AGAP 6   BUCR 25*      TP 8.0   ALB 3.3* GLOB 4.7*   AGRAT 0.7*      CBC w/Diff Recent Labs     08/27/21 0330   WBC 14.4*   RBC 3.59*   HGB 11.0*   HCT 32.8*      GRANS 87*   LYMPH 7*   EOS 0      Coagulation    Iron/Ferritin    BNP    Cardiac Enzymes    Liver Enzymes Recent Labs     08/27/21 0330   TP 8.0   ALB 3.3*         Thyroid Studies Lab Results   Component Value Date/Time    TSH 3.12 09/29/2014 05:53 PM          All Micro Results     Procedure Component Value Units Date/Time    COVID-19 RAPID TEST [[id number]] Collected: 08/27/21 0849    Order Status: Completed Specimen: Nasopharyngeal Updated: 08/27/21 0912     Specimen source Nasopharyngeal        COVID-19 rapid test Not detected        Comment: Rapid Abbott ID Now       Rapid NAAT:  The specimen is NEGATIVE for SARS-CoV-2, the novel coronavirus associated with COVID-19. Negative results should be treated as presumptive and, if inconsistent with clinical signs and symptoms or necessary for patient management, should be tested with an alternative molecular assay. Negative results do not preclude SARS-CoV-2 infection and should not be used as the sole basis for patient management decisions. This test has been authorized by the FDA under an Emergency Use Authorization (EUA) for use by authorized laboratories.    Fact sheet for Healthcare Providers: ConventionUpdate.co.nz  Fact sheet for Patients: ConventionUpdate.co.nz       Methodology: Isothermal Nucleic Acid Amplification         CULTURE, BLOOD [[id number]] Collected: 08/27/21 0315    Order Status: Completed Specimen: Blood Updated: 08/27/21 0643     Special Requests: NO SPECIAL REQUESTS        Culture result: NO GROWTH AFTER 1 HOUR       CULTURE, BLOOD [[id number]] Collected: 08/27/21 0330    Order Status: Completed Specimen: Blood Updated: 08/27/21 0643     Special Requests: NO SPECIAL REQUESTS        Culture result: NO GROWTH AFTER 1 HOUR             Imaging Reviewed:  CT Results (most recent):  Results from Hospital Encounter encounter on 08/27/21    CT ABD PELV W CONT    Narrative  EXAM: CT Abdomen and Pelvis with IV contrast    CLINICAL INDICATION:  fever ruq tenderness    TECHNIQUE: CT of the abdomen and pelvis performed post IV contrast. Sagittal and  coronal reformations obtained. All CT scans at this facility are performed using dose optimization technique as  appropriate to a performed exam, to include automated exposure control,  adjustment of the mA and/or kV according to patient size (including appropriate  matching for site specific examination) or use of iterative reconstruction  technique. IV CONTRAST: 60 cc of Isovue 300    ENTERIC CONTRAST: None    COMPARISON: None    FINDINGS:  Lower Chest:Mild bibasilar atelectasis is noted. No effusion or consolidation  identified. The visualized heart and pericardium are unremarkable. Peritoneum: No free air appreciated. No free fluid identified. No fluid  collections seen. Liver: No focal lesion appreciated. Biliary/Gallbladder: No intrahepatic or extrahepatic biliary ductal dilatation  appreciated. The gallbladder is distended. No distinct inflammatory changes  appreciated. Spleen: Unremarkable. Pancreas: No focal lesion appreciated. The pancreatic duct is unremarkable. No  peripancreatic inflammation or adenopathy. Adrenals: The adrenal glands are unremarkable. Right Kidney:  No perinephric fat stranding appreciated. A 1.3 cm cyst is  noted in the superior pole of the right kidney. There is mild prominence of the  right collecting system without evidence of obstructing process. Left Kidney: No perinephric fat stranding appreciated. In the interpolar  region of the left kidney, there is a 1.5 cm cyst. There is mild prominence of  the left collecting system without evidence of discrete obstructing process.  Pelvic organs: The bladder is distended. No wall thickening identified.  A  potential small diverticulum along the left bladder wall is noted. Prostatic  hypertrophy is noted. The prostate measures 7.6 x 6.7 x 7.7 cm. GI: The stomach is unremarkable. The small bowel is without evidence of  obstruction. No small bowel wall thickening. The mesentery is without  inflammation or adenopathy. The appendix is unremarkable. Diverticulosis of the  descending and sigmoid colon are noted. In a short segment of the sigmoid colon  best seen on axial image 66 of series 2, there is apparent mild narrowing and  thickening of the wall. This is nonspecific but could represent an underlying  lesion. Further evaluation with colonoscopy is recommended. Vasculature: No aortic aneurysm seen. The IVC is unremarkable. Retroperitoneum:  No periaortic adenopathy seen. No fluid collections in the  retroperitoneum appreciated. Abdominal wall: Unremarkable    Musculoskeletal: Multilevel degenerative disc disease and facet arthropathy. Bilateral hip osteoarthritis is noted. Impression  1. Distended gallbladder. No clear inflammatory change. However, if continued  clinical concern, consider nuclear medicine hepatobiliary scan or ultrasound for  further evaluation. 2.  Short segment narrowing of the colon with thick wall. This is nonspecific  but because represent a colonic lesion. Further evaluation with colonoscopy is  recommended. 3.  Prostatic hypertrophy. 4.  Mild prominence of the collecting system bilaterally which may represent  some element of bladder outlet issues. Assessment:   Active Problems:    1. Sepsis POA, (fever 101.8F, , WBC 14.4, +possible UTI)  2. Acute urinary retention with BPH requires dove placement   3. Gross hematuria   4. Suspected acute cystitis   5. Leukocytosis   6. Fever 101.8F   7. Distended gallbladder but no RUQ pain   8. Acute renal injury, prerenal azotemia   9. Hyponatremia   10. Normocytic anemia   11.   Advance age       Plan:     Admitted to telemetry, continue IV fluid for now. Continue IV vancomycin, Cefepime, blood culture, urine culture follow up  Appreciate Urology consult, continue dove in place for now. Add flomax   Antipyretic prn  Avoid nephrotoxic medications.  CT noted   US RUQ, but unclear if this is the pathology, normal LFT, hold off HIDA   pepcid   ICS   PT/OT   Will need Palliative care consult for goal of care for now       Risk of deterioration:  []Low    [x]Moderate  []High     Prophylaxis:  []Lovenox  []Coumadin  []Hep SQ  [x]SCDs  [x]H2B/PPI     Disposition:  []Home w/ Family   [x]HH PT,OT,RN   []SNF/LTC   []SAH/Rehab     Discussed Code Status:         [x]Full Code      []DNR         ___________________________________________________     Care Plan discussed with:    [x]Patient   []Family    []ED Care Manager  []ED Doc   [x]Specialist :  Total Time Coordinating Admission:  70    minutes    []Total Critical Care Time:       Bruce Whitehead MD  8/27/2021, 1:55 PM

## 2021-08-27 NOTE — ED NOTES
Patient turned over to me Dr. Alysia Fink 80year-old gentleman presents with weakness over the past 2 days associated with fevers. No nausea no vomiting. Patient awake alert oriented to self on exam lungs are clear noted to have what appears to be a distended bladder. Will scan the bladder to see if she is retaining. CBC is unremarkable  She notes very mild DALLIN    EKG sinus in the 90s with normal axis normal intervals no ST elevation or depression no hypertrophy. Unlikely to be admitted for weakness fever.   He seems to have a distended bladder will scan for retention

## 2021-08-27 NOTE — ED NOTES
Bladder scanned patient. He had greater than 520mL. Attempted to place dove catheter but was unsuccessful. ED provider aware. Bedside and verbal shift report given by Renea Ford RN (off going nurse) to Shawnee Cruz RN (oncoming nurse). Report included the following information SBAR and ED Summary.

## 2021-08-27 NOTE — PROGRESS NOTES
Called ED rec'd report from . Asked prior to patient being xfer to the floor If an complete set of vitals could be obtained. Also asked if past due medications could be given prior to patient coming up to the floor.   RN confirmed understanding

## 2021-08-27 NOTE — ED TRIAGE NOTES
Patient presents to the ED via EMS from home for evaluation of generalized weakness. Per caregiver, [facility] is normally talkative, eats and drinks well. Over the past day I noticed he was not really eating or drinking anything. He was not talking that much either. I asked him what was wrong and he just says he does not feel good and feels weak. \"

## 2021-08-27 NOTE — PROGRESS NOTES
Kinetic Dosing - Initial Progress Note    Vancomycin 1334 mg for naïve patient was adjusted to 1500 mg (750 mg + 750 mg) IV @ [facility]. No Pharmacy Consult ordered by provider at this time. BACKGROUND  Indication: Sepsis of Unknown Etiology   CC: Fatigue     Renal Function: DALLIN? PMH: Past Medical History:   Diagnosis Date    Allergy     Anxiety states     Arthritis     Benign essential hypertension     Depressive disorder     Diverticulitis     Dizziness and giddiness     Elevated prostate specific antigen (PSA)     Glaucoma     Gross hematuria     Hypertrophy of prostate with urinary obstruction and other lower urinary tract symptoms (LUTS)     Nocturia     Osteoarthrosis involving multiple sites     Tremor          Vitals: Visit Vitals  BP (!) 119/96   Pulse 93   Temp (!) 101.8 °F (38.8 °C)   Resp 15   Ht 177.8 cm (70\")   Wt 66.7 kg (147 lb)   SpO2 95%   BMI 21.09 kg/m²      Labs: Recent Labs     08/27/21  0330 08/27/21  0329   WBC 14.4*  --    CREA 1.72*  --    BUN 43*  --    BUCR 25*  --    ALB 3.3*  --    LACPOC  --  0.99   estimated creatinine clearance is 22.6 mL/min (A) (based on SCr of 1.72 mg/dL (H)). No results for input(s): VANCP, VANCT, VANCR in the last 336 hours.       Cultures: Results       Procedure Component Value Units Date/Time    CULTURE, BLOOD [[id number]]     Order Status: Sent Specimen: Blood     CULTURE, BLOOD [[id number]]     Order Status: Sent Specimen: Blood              Current Antimicrobial Therapy (168h ago, onward)       Ordered     Start Stop    08/27/21 0457  vancomycin (VANCOCIN) 1,334 mg in 0.9% sodium chloride 250 mL IVPB  20 mg/kg,   IntraVENous,   NOW      08/27/21 0458 08/27/21 1657 08/27/21 0457  cefepime (MAXIPIME) 2 g in sterile water (preservative free) 10 mL IV syringe  2 g,   IntraVENous,   ONCE      08/27/21 0458 08/27/21 1657                         Continue to monitor    Sign: Carina Farias, [facility]  Date: 8/27/2021

## 2021-08-27 NOTE — Clinical Note
Status[de-identified] INPATIENT [101]   Type of Bed: Telemetry [19]   Cardiac Monitoring Required?: No   Inpatient Hospitalization Certified Necessary for the Following Reasons: 3.  Patient receiving treatment that can only be provided in an inpatient setting (further clarification in H&P documentation)   Admitting Diagnosis: UTI (urinary tract infection) [600442]   Admitting Diagnosis: Urinary retention [450847]   Admitting Physician: Yokasta Duff [[id number]]   Attending Physician: oXchilt Baez   Estimated Length of Stay: 2 Midnights   Discharge Plan[de-identified] Home with Office Follow-up

## 2021-08-27 NOTE — ED NOTES
Please call caregiver [facility]) with any updates and/or if patient gets discharged.  [phone]

## 2021-08-28 NOTE — PROGRESS NOTES
Noted-Du catheter irrigated/draining very scant hematuria noted/bladder irrigation system not needed at this time. Dr. Jana Ocampo informed. Per Dr. Jana Ocampo I will consult urology. No further instructions provided. 1900-Bedside and Verbal shift change report given to Lennox Seaman RN (oncoming nurse) by [place] RN (offgoing nurse). Report included the following information SBAR, Kardex and MAR.

## 2021-08-28 NOTE — PROGRESS NOTES
Occupational Therapy Goals  Initiated 8/28/2021 within 7 day(s). 1.  Patient will perform UB bathing/dressing with SBA  2. Patient will perform LB bathing/dressing with SBA  3. Patient will maneuver in bed in preparation for his participation in his self care routine with modified independence    This patient will be seen for a 3 day trial of skilled OT intervention    OCCUPATIONAL THERAPY EVALUATION    Patient: Fabi Lopez (42 y.o. male)  Date: 8/28/2021  Primary Diagnosis: UTI (urinary tract infection) [N39.0]  Urinary retention [R33.9]        Precautions: fall     PLOF: this patient is unable to provide consistent details of his prior level of function other than he has someone help him at home and he can get himself washed and dressed. ASSESSMENT :  Based on the objective data described below, the patient presents with decreased active participation in his self care routine. Feel as this environment becomes more familiar to him he may participate more in skilled OT intervention. Patient will benefit from skilled intervention to address the above impairments.   Patient's rehabilitation potential is considered to be Fair  Factors which may influence rehabilitation potential include:   []             None noted  [x]             Mental ability/status  []             Medical condition  []             Home/family situation and support systems  []             Safety awareness  []             Pain tolerance/management  []             Other:      PLAN :  Recommendations and Planned Interventions:   [x]               Self Care Training                  [x]      Therapeutic Activities  []               Functional Mobility Training   []      Cognitive Retraining  []               Therapeutic Exercises           []      Endurance Activities  []               Balance Training                    []      Neuromuscular Re-Education  []               Visual/Perceptual Training     []      Home Safety Training  [] Patient Education                   []      Family Training/Education  [x]               Other (comment): therapeutic use of self    Frequency/Duration: Patient will be followed by occupational therapy 1-2 times per day/4-7 days per week to address goals. Discharge Recommendations: To Be Determined  Further Equipment Recommendations for Discharge: N/A     SUBJECTIVE:   Patient stated I just wash myself with a cloth at home, I don't shower much.     OBJECTIVE DATA SUMMARY:     Past Medical History:   Diagnosis Date    Allergy     Anxiety states     Arthritis     Benign essential hypertension     Depressive disorder     Diverticulitis     Dizziness and giddiness     Elevated prostate specific antigen (PSA)     Glaucoma     Gross hematuria     Hypertrophy of prostate with urinary obstruction and other lower urinary tract symptoms (LUTS)     Nocturia     Osteoarthrosis involving multiple sites     Tremor      Past Surgical History:   Procedure Laterality Date    HX APPENDECTOMY      HX HERNIA REPAIR      HX OTHER SURGICAL      rectal fissure surgery    HX OTHER SURGICAL Bilateral     eye surgery     Barriers to Learning/Limitations: yes;  emotional and cognitive  Compensate with: visual, verbal, tactile, kinesthetic cues/model    Home Situation:   Home Situation  Home Environment: Private residence  One/Two Story Residence: One story  Living Alone: No  Support Systems: Home care staff  Patient Expects to be Discharged to[de-identified] House  Current DME Used/Available at Home: Donn Waldron  [x]  Right hand dominant   []  Left hand dominant    Cognitive/Behavioral Status:   He is alert, oriented to self only this morning     Skin: no skin integrity issues noted during OT evaluation  Edema: no UE edema noted    Vision/Perceptual:      Tracking is [facility]       Coordination: BUE   WFL BUE's     Strength: BUE  4-/5   Tone & Sensation: BUE  WFL     Range of Motion: BUE  AROM WFL       Functional Mobility and Transfers for ADLs: he declined stating: \" I am comfortable, I don't want to\"     ADL Assessment:    Self feeding: independent  Grooming: SBA (simulated at bed level)  UB bathing/dressing: declined to complete or simulate  LB bathing/dressing: declined to complete or simulate    Pain:  Pain level pre-treatment: 0/10   Pain level post-treatment: 0/10     Activity Tolerance:   No SOB noted; minimal participation noted  Please refer to the flowsheet for vital signs taken during this treatment. After treatment:   [] Patient left in no apparent distress sitting up in chair  [x] Patient left in no apparent distress in bed  [x] Call bell left within reach  [] Nursing notified  [] Caregiver present  [] Bed alarm activated    COMMUNICATION/EDUCATION:   [x] Role of Occupational Therapy in the acute care setting  [] Home safety education was provided and the patient/caregiver indicated understanding. [] Patient/family have participated as able in goal setting and plan of care. [] Patient/family agree to work toward stated goals and plan of care. [] Patient understands intent and goals of therapy, but is neutral about his/her participation. [] Patient is unable to participate in goal setting and plan of care. Thank you for this referral.  Joby Araujo, OTR/L  Time Calculation: 9 mins    Eval Complexity: History: LOW Complexity : Brief history review ; Examination: LOW Complexity : 1-3 performance deficits relating to physical, cognitive , or psychosocial skils that result in activity limitations and / or participation restrictions ;    Decision Making:LOW Complexity : No comorbidities that affect functional and no verbal or physical assistance needed to complete eval tasks

## 2021-08-28 NOTE — PROGRESS NOTES
Hospitalist Progress Note    Subjective:   Daily Progress Note: 2021    Patient was seen in presence of caregiver. As per caregiver patient has  caregiver at home as well as wife lives at home with him. Patient denies any chest pain abdominal pain. No nausea vomiting. As per nursing patient continues to have hematuria which is clearing. Patient denies any headaches or dizziness currently. Current Facility-Administered Medications   Medication Dose Route Frequency    cefepime (MAXIPIME) 1 g in sterile water (preservative free) 10 mL IV syringe  1 g IntraVENous Q24H    0.9% sodium chloride infusion  100 mL/hr IntraVENous CONTINUOUS    tamsulosin (FLOMAX) capsule 0.4 mg  0.4 mg Oral DAILY    VANCOMYCIN INFORMATION NOTE   Other Rx Dosing/Monitoring    propranoloL (INDERAL) tablet 10 mg  10 mg Oral BID    risperiDONE (RisperDAL) tablet 0.5 mg  0.5 mg Oral QPM    pantoprazole (PROTONIX) tablet 40 mg  40 mg Oral ACB/HS    sodium chloride (NS) flush 5-40 mL  5-40 mL IntraVENous Q8H    sodium chloride (NS) flush 5-40 mL  5-40 mL IntraVENous PRN    acetaminophen (TYLENOL) tablet 650 mg  650 mg Oral Q6H PRN    Or    acetaminophen (TYLENOL) suppository 650 mg  650 mg Rectal Q6H PRN    polyethylene glycol (MIRALAX) packet 17 g  17 g Oral DAILY PRN    promethazine (PHENERGAN) tablet 12.5 mg  12.5 mg Oral Q6H PRN    Or    ondansetron (ZOFRAN) injection 4 mg  4 mg IntraVENous Q6H PRN    finasteride (PROSCAR) tablet 5 mg  5 mg Oral DAILY        Review of Systems  Pertinent items are noted in HPI.     Objective:     Visit Vitals  /72 (BP 1 Location: Left upper arm, BP Patient Position: At rest)   Pulse 74   Temp 98.9 °F (37.2 °C)   Resp 19   Ht 5' 10\" (1.778 m)   Wt 66.7 kg (147 lb)   SpO2 97%   BMI 21.09 kg/m²      O2 Device: None (Room air)    Temp (24hrs), Av.3 °F (36.8 °C), Min:97.7 °F (36.5 °C), Max:98.9 °F (37.2 °C)       07 - 08/28 1900  In: 340 [P.O.:340]  Out: 500 [Urine:500]  08/26 1901 - 08/28 0700  In: 1000 [I.V.:1000]  Out: -     General appearance -awake, pleasantly confused, not in acute distress  Chest -clear air entry noted in bases, no wheezes  Heart - S1 and S2 normal  Abdomen - soft, nontender, nondistended, Bowel sounds present, Du catheter is in situ  Neurological -awake, follows commands appropriately, moves all extremities  Musculoskeletal - no joint tenderness or erythema of knees bilaterally  Extremities - no pedal edema noted      Data Review    Recent Results (from the past 24 hour(s))   VANCOMYCIN, RANDOM    Collection Time: 08/28/21  4:38 AM   Result Value Ref Range    Vancomycin, random 1.9 (L) 5.0 - 70.8 UG/ML   METABOLIC PANEL, BASIC    Collection Time: 08/28/21  4:38 AM   Result Value Ref Range    Sodium 136 136 - 145 mmol/L    Potassium 3.9 3.5 - 5.5 mmol/L    Chloride 105 100 - 111 mmol/L    CO2 23 21 - 32 mmol/L    Anion gap 8 3.0 - 18 mmol/L    Glucose 88 74 - 99 mg/dL    BUN 43 (H) 7.0 - 18 MG/DL    Creatinine 1.84 (H) 0.6 - 1.3 MG/DL    BUN/Creatinine ratio 23 (H) 12 - 20      GFR est AA 41 (L) >60 ml/min/1.73m2    GFR est non-AA 34 (L) >60 ml/min/1.73m2    Calcium 7.7 (L) 8.5 - 10.1 MG/DL   MAGNESIUM    Collection Time: 08/28/21  4:38 AM   Result Value Ref Range    Magnesium 2.2 1.6 - 2.6 mg/dL   CBC W/O DIFF    Collection Time: 08/28/21  4:38 AM   Result Value Ref Range    WBC 15.5 (H) 4.6 - 13.2 K/uL    RBC 3.11 (L) 4.35 - 5.65 M/uL    HGB 9.4 (L) 13.0 - 16.0 g/dL    HCT 29.1 (L) 36.0 - 48.0 %    MCV 93.6 78.0 - 100.0 FL    MCH 30.2 24.0 - 34.0 PG    MCHC 32.3 31.0 - 37.0 g/dL    RDW 13.8 11.6 - 14.5 %    PLATELET 944 123 - 936 K/uL    MPV 11.9 (H) 9.2 - 11.8 FL   PROCALCITONIN    Collection Time: 08/28/21  4:38 AM   Result Value Ref Range    Procalcitonin 7.40 ng/mL         Assessment/Plan:     ASSESSMENT:    1.  Sepsis due to UTI/cystitis, improving  2. Acute urinary retention with BPH status post Du  3. Gross hematuria   4.    Acute UTI  5. Leukocytosis, stable  6. Fever 101.8F, resolved  7. Distended gallbladder but no RUQ pain or tenderness  8. Acute renal injury, prerenal azotemia  9. Hypertension    PLAN:    Blood cultures are negative  Follow urine culture  Continue Flomax  We will put patient on IV fluid and monitor renal function  Continue antibiotics and we will de-escalate once her urine culture report is back  Continue monitoring H&H  Urology will be consulted for #3  CT scan also showed there is some intestinal/large colon abnormality and patient may warrant colonoscopy but not at this age. Continue propranolol  PT and OT to follow this patient  Patient has signed DNR in 2014. Patient also has a living will that states to not resuscitate as well as no PEG tube. Patient's medical power of , Ms. Joseltio Taylor, will be here at 11 AM to meet me tomorrow so I can finish/complete POST form. Total time to take care of this patient was 25 minutes and more than 50% of time was spent counseling and coordinating care. Disclaimer: Sections of this note are dictated using utilizing voice recognition software, which may have resulted in some phonetic based errors in grammar and contents. Even though attempts were made to correct all the mistakes, some may have been missed, and remained in the body of the document. If questions arise, please contact our department.

## 2021-08-28 NOTE — CONSULTS
Comprehensive Nutrition Assessment    Type and Reason for Visit: Initial, Positive nutrition screen, Consult    Nutrition Recommendations/Plan:   - Modify supplement: decrease Magic Cup to BID, add Ensure Enlive BID  - Continue all other nutrition interventions. Encourage/ monitor po intake of meals and supplements     Nutrition Assessment:  Pt admitted for acute urinary retention, suspected acute cystitis, distended gallbladder, acute renal injury; unavailable at time of visit. Was not eating/ drinking PTA. Noted wt loss in past several years PTA per chart hx. On po diet; poor meal intake per chart documentation. Receiving nutrition supplements; fair intake. Tolerating diet. Per MD note, pt has living will, specifying DNR and no PEG tube; noted plan for MPOA to complete POST tomorrow. Malnutrition Assessment:  Malnutrition Status: At risk for malnutrition (specify) (poor/ no po intake PTA and since admission)      Nutrition History and Allergies: Past medical hx:  Anxiety, HTN, depressive disorder, diverticulitis, appendectomy, hernia repair. Poor/ no appetite and po intake PTA per chart review. Wt loss of 17 lb, 10.4% x 6 years and 9 month PTA per chart hx. No known food allergies     Estimated Daily Nutrient Needs:  Energy (kcal): 7519-0282; Weight Used for Energy Requirements: Admission (66.7 kg)  Protein (g): 53-80; Weight Used for Protein Requirements: Admission (x0.8-1.2)  Fluid (ml/day): 500 mL + output; Method Used for Fluid Requirements: Standard renal      Nutrition Related Findings:  BM unknown. No edema.        IVF, NS at 100 mL/hr      Wounds:    None       Current Nutrition Therapies:  ADULT DIET Regular  ADULT ORAL NUTRITION SUPPLEMENT Breakfast, Lunch, Dinner; Frozen Supplement    Anthropometric Measures:  · Height:  5' 10\" (177.8 cm)  · Current Body Wt:  66.7 kg (147 lb 0.8 oz)   · Admission Body Wt:  147 lb 0.8 oz    · Usual Body Wt:   (164 lb in November 2014 per chart hx) · Ideal Body Wt:  166 lbs:  88.6 %   · Adjusted Body Weight:   ; Weight Adjustment for: No adjustment   · BMI Category:  Underweight (BMI less than 22) age over 72       Nutrition Diagnosis:   · Inadequate oral intake related to early satiety as evidenced by intake 0-25%, poor intake prior to admission      Nutrition Interventions:   Food and/or Nutrient Delivery: Continue current diet, Modify oral nutrition supplement, IV fluid delivery  Nutrition Education and Counseling: Education not indicated  Coordination of Nutrition Care: Continue to monitor while inpatient    Goals:  PO nutrition intake will meet >75% of patient's estimated nutrition needs within the next 7 days       Nutrition Monitoring and Evaluation:   Behavioral-Environmental Outcomes: None identified  Food/Nutrient Intake Outcomes: Food and nutrient intake, Supplement intake, IVF intake  Physical Signs/Symptoms Outcomes: Biochemical data, Meal time behavior, Nutrition focused physical findings    Discharge Planning:     Too soon to determine     Electronically signed by Sony Doherty RD on 8/28/2021 at 4:10 PM    Contact: [phone]

## 2021-08-29 NOTE — PROGRESS NOTES
.1-  Bedside report given by [facility], [address] [facility]. Pt in bed sleeping no distress noted. 1939-Pt in bed resting alert and oriented to self and place reoriented to time, situation. Pt denies pain at the moment. Assessement completed plan of care for the shift explained pt verbalized understanding. IVF infusing well,, dove catheter in place draining yellow urine. HOB elevated, bed low and in locked position. Call light and frequently used items within reach. Bed alarm on for safety. 0600 Pt sleeping, dove catheter draining well  uneventful night. 1920-Bedside and Verbal shift change report given to   JackRN (oncoming nurse) by Kenyon Garcia RN (offgoing nurse). Report included the following information SBAR, Kardex, Intake/Output, MAR and Recent Results.

## 2021-08-29 NOTE — PROGRESS NOTES
Problem: Falls - Risk of  Goal: *Absence of Falls  Description: Document Yecenia Nguyễn Fall Risk and appropriate interventions in the flowsheet. Outcome: Progressing Towards Goal  Note: Fall Risk Interventions:  Mobility Interventions: Bed/chair exit alarm    Mentation Interventions: Adequate sleep, hydration, pain control, More frequent rounding, Room close to nurse's station    Medication Interventions: Patient to call before getting OOB, Teach patient to arise slowly, Bed/chair exit alarm    Elimination Interventions: Call light in reach, Bed/chair exit alarm, Patient to call for help with toileting needs              Problem: Patient Education: Go to Patient Education Activity  Goal: Patient/Family Education  Outcome: Progressing Towards Goal     Problem: Pressure Injury - Risk of  Goal: *Prevention of pressure injury  Description: Document Joe Scale and appropriate interventions in the flowsheet. Outcome: Progressing Towards Goal  Note: Pressure Injury Interventions:  Sensory Interventions: Assess changes in LOC, Discuss PT/OT consult with provider, Maintain/enhance activity level, Keep linens dry and wrinkle-free, Pressure redistribution bed/mattress (bed type), Turn and reposition approx.  every two hours (pillows and wedges if needed)    Moisture Interventions: Absorbent underpads, Internal/External urinary devices    Activity Interventions: Pressure redistribution bed/mattress(bed type)    Mobility Interventions: HOB 30 degrees or less, Pressure redistribution bed/mattress (bed type), PT/OT evaluation    Nutrition Interventions: Document food/fluid/supplement intake    Friction and Shear Interventions: Apply protective barrier, creams and emollients                Problem: Patient Education: Go to Patient Education Activity  Goal: Patient/Family Education  Outcome: Progressing Towards Goal     Problem: Patient Education: Go to Patient Education Activity  Goal: Patient/Family Education  Outcome: Progressing Towards Goal     Problem: Nutrition Deficit  Goal: *Optimize nutritional status  Outcome: Progressing Towards Goal     Problem: Urinary Tract Infection - Adult  Goal: *Absence of infection signs and symptoms  Outcome: Progressing Towards Goal     Problem: Patient Education: Go to Patient Education Activity  Goal: Patient/Family Education  Outcome: Progressing Towards Goal

## 2021-08-29 NOTE — PROGRESS NOTES
Pt not seen for skilled PT due to:    []  Nausea/vomiting  []  Eating  []  Pain  []  Pt lethargic  []  Off Unit  Other: patient politely declined to participate in therapy session at this time despite max encouragement/education from therapist and sitter appointed by power of . 1765    Attempt 2:  patient politely declined to participate in therapy session at this time despite max encouragement/education from therapist and power of . Patient lethargic. Moved feet/toes upon request to \"bend\" or \"straigthen\" legs. 1109     Will f/u later as schedule allows. Thank you.   Jillian Munroe, PT, DPT

## 2021-08-29 NOTE — PROGRESS NOTES
Hospitalist Progress Note    Subjective:   Daily Progress Note: 2021    Patient was seen in presence of caregiver. Patient's medical power of  came here but had to go and no one notified me so I could not meet her. Patient is lying in bed in NAD. Denies having any pain. As per caregiver patient ate little bit this morning. Current Facility-Administered Medications   Medication Dose Route Frequency    cefepime (MAXIPIME) 1 g in sterile water (preservative free) 10 mL IV syringe  1 g IntraVENous Q24H    tamsulosin (FLOMAX) capsule 0.4 mg  0.4 mg Oral DAILY    VANCOMYCIN INFORMATION NOTE   Other Rx Dosing/Monitoring    propranoloL (INDERAL) tablet 10 mg  10 mg Oral BID    risperiDONE (RisperDAL) tablet 0.5 mg  0.5 mg Oral QPM    pantoprazole (PROTONIX) tablet 40 mg  40 mg Oral ACB/HS    sodium chloride (NS) flush 5-40 mL  5-40 mL IntraVENous Q8H    sodium chloride (NS) flush 5-40 mL  5-40 mL IntraVENous PRN    acetaminophen (TYLENOL) tablet 650 mg  650 mg Oral Q6H PRN    Or    acetaminophen (TYLENOL) suppository 650 mg  650 mg Rectal Q6H PRN    polyethylene glycol (MIRALAX) packet 17 g  17 g Oral DAILY PRN    promethazine (PHENERGAN) tablet 12.5 mg  12.5 mg Oral Q6H PRN    Or    ondansetron (ZOFRAN) injection 4 mg  4 mg IntraVENous Q6H PRN    finasteride (PROSCAR) tablet 5 mg  5 mg Oral DAILY        Review of Systems  Pertinent items are noted in HPI.     Objective:     Visit Vitals  BP (!) 150/73   Pulse 71   Temp 98 °F (36.7 °C)   Resp 16   Ht 5' 10\" (1.778 m)   Wt 66.7 kg (147 lb)   SpO2 95%   BMI 21.09 kg/m²      O2 Device: None (Room air)    Temp (24hrs), Av.3 °F (36.8 °C), Min:98 °F (36.7 °C), Max:99 °F (37.2 °C)      701 -  1900  In: -   Out: 400 [Urine:400]  1901 -  0700  In: 560 [P.O.:560]  Out: 1300 [Urine:1300]    General appearance -awake, pleasantly confused, not in acute distress  Chest -clear air entry noted in bases, no wheezes  Heart - S1 and S2 normal  Abdomen - soft, nontender, nondistended, Bowel sounds present, Du catheter is in situ  Neurological -awake, follows commands appropriately, moves all extremities  Extremities - no pedal edema noted      Data Review    Recent Results (from the past 24 hour(s))   METABOLIC PANEL, BASIC    Collection Time: 08/29/21  2:00 AM   Result Value Ref Range    Sodium 136 136 - 145 mmol/L    Potassium 3.7 3.5 - 5.5 mmol/L    Chloride 106 100 - 111 mmol/L    CO2 23 21 - 32 mmol/L    Anion gap 7 3.0 - 18 mmol/L    Glucose 85 74 - 99 mg/dL    BUN 40 (H) 7.0 - 18 MG/DL    Creatinine 1.63 (H) 0.6 - 1.3 MG/DL    BUN/Creatinine ratio 25 (H) 12 - 20      GFR est AA 48 (L) >60 ml/min/1.73m2    GFR est non-AA 39 (L) >60 ml/min/1.73m2    Calcium 7.4 (L) 8.5 - 10.1 MG/DL   MAGNESIUM    Collection Time: 08/29/21  2:00 AM   Result Value Ref Range    Magnesium 2.4 1.6 - 2.6 mg/dL   VANCOMYCIN, RANDOM    Collection Time: 08/29/21  2:00 AM   Result Value Ref Range    Vancomycin, random 19.4 5.0 - 40.0 UG/ML   CBC WITH AUTOMATED DIFF    Collection Time: 08/29/21  2:00 AM   Result Value Ref Range    WBC 15.6 (H) 4.6 - 13.2 K/uL    RBC 3.07 (L) 4.35 - 5.65 M/uL    HGB 9.3 (L) 13.0 - 16.0 g/dL    HCT 28.8 (L) 36.0 - 48.0 %    MCV 93.8 78.0 - 100.0 FL    MCH 30.3 24.0 - 34.0 PG    MCHC 32.3 31.0 - 37.0 g/dL    RDW 13.9 11.6 - 14.5 %    PLATELET 391 906 - 050 K/uL    MPV 11.6 9.2 - 11.8 FL    NEUTROPHILS 88 (H) 40 - 73 %    LYMPHOCYTES 7 (L) 21 - 52 %    MONOCYTES 4 3 - 10 %    EOSINOPHILS 1 0 - 5 %    BASOPHILS 0 0 - 2 %    ABS. NEUTROPHILS 13.6 (H) 1.8 - 8.0 K/UL    ABS. LYMPHOCYTES 1.1 0.9 - 3.6 K/UL    ABS. MONOCYTES 0.7 0.05 - 1.2 K/UL    ABS. EOSINOPHILS 0.1 0.0 - 0.4 K/UL    ABS. BASOPHILS 0.0 0.0 - 0.1 K/UL    DF AUTOMATED           Assessment/Plan:     ASSESSMENT:    1.  Sepsis due to UTI/cystitis, improving  2. Acute urinary retention with BPH status post Du  3. Gross hematuria, improving  4. Acute UTI, better  5. Leukocytosis, stable  6. Fever 101.8F, resolved  7. Distended gallbladder but no RUQ pain or tenderness  8. Acute renal injury, prerenal azotemia, resolving  9. Hypertension    PLAN:    Urine and blood cultures are negative  Discussed with urologist: Continue Du catheter and outpatient follow-up  We will continue cefepime until tomorrow and discontinue vancomycin today  Stable H&H  Continue propranolol  CT scan also showed there is some intestinal/large colon abnormality and patient may warrant colonoscopy but not at this age. Medical power of  is in agreement with it. PT and OT to follow this patient  Discharge patient home tomorrow with home health care if remains stable overnight. Patient will remain DNR. Medical power of  will come here tomorrow to complete POST form. Total time to take care of this patient was 25 minutes and more than 50% of time was spent counseling and coordinating care. Disclaimer: Sections of this note are dictated using utilizing voice recognition software, which may have resulted in some phonetic based errors in grammar and contents. Even though attempts were made to correct all the mistakes, some may have been missed, and remained in the body of the document. If questions arise, please contact our department.

## 2021-08-29 NOTE — PROGRESS NOTES
1. Urinary tract infection without hematuria, site unspecified    2. Urinary retention        ASSESSMENT:   Hx BPH, AUR, gross hematuria   Difficult dove placement after multiple failed attempts    S/p bedside cysto, guide-wire and dove placement by Dr. Jose C Holguin 8/27/2021   White count 15.6, stable   Creatinine 1.63 from 1.84 yesterday   Urine culture no growth   Baseline Creat 1.2 in 2018    Presented to the ED for weakness and AMS    PLAN:    Dove placed with bedside cysto, catheter over guide-wire   Urine draining well, yellow    No need for CBI at this time  CT reviewed, known BPH  Maintain catheter, Do not remove at discharge   Continue Flomax, Finasteride    Urine culture no growth. Antibiotics per primary  Ok for dc with catheter    Follow up arranged? Patient will need an outpatient voiding trial and cystoscopy for BPH, [facility] workup, msg sent     Rona No MD     [phone]    Chief Complaint   Patient presents with    Fatigue       SUBJECTIVE: Some gross hematuria overnight        No flowsheet data found.       Past Medical History:   Diagnosis Date    Allergy     Anxiety states     Arthritis     Benign essential hypertension     Depressive disorder     Diverticulitis     Dizziness and giddiness     Elevated prostate specific antigen (PSA)     Glaucoma     Gross hematuria     Hypertrophy of prostate with urinary obstruction and other lower urinary tract symptoms (LUTS)     Nocturia     Osteoarthrosis involving multiple sites     Tremor        Past Surgical History:   Procedure Laterality Date    HX APPENDECTOMY      HX HERNIA REPAIR      HX OTHER SURGICAL      rectal fissure surgery    HX OTHER SURGICAL Bilateral     eye surgery       Social History     Tobacco Use    Smoking status: Former Smoker    Smokeless tobacco: Never Used   Substance Use Topics    Alcohol use: Yes     Comment: occasional    Drug use: Not on file       Allergies   Allergen Reactions    Sulfa (Sulfonamide Antibiotics) Other (comments)     Dizziness/ passed out       Family History   Problem Relation Age of Onset    No Known Problems Mother     No Known Problems Father     Hypertension Maternal Grandfather     Stroke Maternal Grandfather        Current Facility-Administered Medications   Medication Dose Route Frequency Provider Last Rate Last Admin    vancomycin (VANCOCIN) 750 mg in 0.9% sodium chloride 250 mL (VIAL-MATE)  750 mg IntraVENous ONCE Manish Gilman MD        cefepime (MAXIPIME) 1 g in sterile water (preservative free) 10 mL IV syringe  1 g IntraVENous Q24H Gabriel Rodriguez MD   1 g at 21 0900    tamsulosin (FLOMAX) capsule 0.4 mg  0.4 mg Oral DAILY Avel Mosqueda MD   0.4 mg at 21 0900    VANCOMYCIN INFORMATION NOTE   Other Rx Dosing/Monitoring Avel Mosqueda MD        propranoloL (INDERAL) tablet 10 mg  10 mg Oral BID Avel Mosqueda MD   10 mg at 21 1823    risperiDONE (RisperDAL) tablet 0.5 mg  0.5 mg Oral QPM Avel Mosqueda MD   0.5 mg at 21 1823    pantoprazole (PROTONIX) tablet 40 mg  40 mg Oral ACB/HS Avel Mosqueda MD   40 mg at 21 0657    sodium chloride (NS) flush 5-40 mL  5-40 mL IntraVENous Q8H Avel Mosqueda MD   10 mL at 21 0657    sodium chloride (NS) flush 5-40 mL  5-40 mL IntraVENous PRN Avel Mosqueda MD        acetaminophen (TYLENOL) tablet 650 mg  650 mg Oral Q6H PRN Avel Mosqueda MD        Or   [facility] acetaminophen (TYLENOL) suppository 650 mg  650 mg Rectal Q6H PRN Avel Mosqueda MD        polyethylene glycol (MIRALAX) packet 17 g  17 g Oral DAILY PRN Avel Mosqueda MD        promethazine (PHENERGAN) tablet 12.5 mg  12.5 mg Oral Q6H PRJAMES Mosqueda MD        Or    ondansetron [facility]) injection 4 mg  4 mg IntraVENous Q6H PRN Avel Mosqueda MD        finasteride (PROSCAR) tablet 5 mg  5 mg Oral DAILY Ailyn Mercado NP   5 mg at 21 0900       Review of Systems  ROS is:     Not obtainable due to patient factors- confused, AMS    Negative for: Ophthalmologic issues, ENT issues, Cardiovascular issues, respiratory issues, GI issues, neurologic issues, hematoogic issues, skin lesions, musculoskeletal issues, psychiatric issues  Exceptions: yes    Positive for:    Urinary retention, abd discomfort               PHYSICAL EXAMINATION:   Visit Vitals  BP (!) 163/75   Pulse 80   Temp 99 °F (37.2 °C)   Resp 22   Ht 5' 10\" (1.778 m)   Wt 147 lb (66.7 kg)   SpO2 96%   BMI 21.09 kg/m²     Constitutional: Ellderly, well nourished male. No acute distress. HEENT: Normocephalic, Atraumatic, EOM's intact   CV:  no edema  Respiratory: No respiratory distress or difficulties breathing   Abdomen:  Distended and tender on palpation    Male:  No CVA tenderness  SCROTUM:  No scrotal rash or lesions noticed. Normal bilateral testes and epididymis. PENIS: Urethral meatus normal in location and size. No urethral discharge. Circumsized   16 [address] [place] dove placed over wired with cysto. Urine yellow with blood streaks   Skin: No evidence of jaundice. Normal color  Neuro/Psych:  Alert and oriented. Affect appropriate. REVIEW OF LABS AND IMAGING:      CT A/P 8/27  IMPRESSION  1. Distended gallbladder. No clear inflammatory change. However, if continued  clinical concern, consider nuclear medicine hepatobiliary scan or ultrasound for  further evaluation.     2. Short segment narrowing of the colon with thick wall. This is nonspecific  but because represent a colonic lesion. Further evaluation with colonoscopy is  recommended.     3.  Prostatic hypertrophy.     4.  Mild prominence of the collecting system bilaterally which may represent  some element of bladder outlet issues.       Labs: Results:   Chemistry    Recent Labs     08/29/21  0200 08/28/21  0438 08/27/21  0330   GLU 85 88 99    136 135*   K 3.7 3.9 4.1    105 104   CO2 23 23 25   BUN 40* 43* 43*   CREA 1.63* 1.84* 1.72*   CA 7.4* 7.7* 8.3*   AGAP 7 8 6   BUCR 25* 23* 25*   AP  --   --  104 TP  --   --  8.0   ALB  --   --  3.3*   GLOB  --   --  4.7*   AGRAT  --   --  0.7*      CBC w/Diff Recent Labs     08/29/21  0200 08/28/21  0438 08/27/21  0330   WBC 15.6* 15.5* 14.4*   RBC 3.07* 3.11* 3.59*   HGB 9.3* 9.4* 11.0*   HCT 28.8* 29.1* 32.8*    256 256   GRANS 88*  --  87*   LYMPH 7*  --  7*   EOS 1  --  0      Cultures Recent Labs     08/27/21  1343 08/27/21  0330 08/27/21 0315   CULT No growth (<1,000 CFU/ML) NO GROWTH 2 DAYS NO GROWTH 2 DAYS     All Micro Results     Procedure Component Value Units Date/Time    CULTURE, URINE [[id number]] Collected: 08/27/21 1343    Order Status: Completed Specimen: Cath Urine Updated: 08/29/21 0736     Special Requests: NO SPECIAL REQUESTS        Culture result: No growth (<1,000 CFU/ML)       CULTURE, BLOOD [[id number]] Collected: 08/27/21 0315    Order Status: Completed Specimen: Blood Updated: 08/29/21 0607     Special Requests: NO SPECIAL REQUESTS        Culture result: NO GROWTH 2 DAYS       CULTURE, BLOOD [[id number]] Collected: 08/27/21 0330    Order Status: Completed Specimen: Blood Updated: 08/29/21 0607     Special Requests: NO SPECIAL REQUESTS        Culture result: NO GROWTH 2 DAYS       CULTURE, URINE [[id number]]     Order Status: Canceled Specimen: Cath Urine     COVID-19 RAPID TEST [[id number]] Collected: 08/27/21 0849    Order Status: Completed Specimen: Nasopharyngeal Updated: 08/27/21 0912     Specimen source Nasopharyngeal        COVID-19 rapid test Not detected        Comment: Rapid Abbott ID Now       Rapid NAAT:  The specimen is NEGATIVE for SARS-CoV-2, the novel coronavirus associated with COVID-19. Negative results should be treated as presumptive and, if inconsistent with clinical signs and symptoms or necessary for patient management, should be tested with an alternative molecular assay. Negative results do not preclude SARS-CoV-2 infection and should not be used as the sole basis for patient management decisions.        This test has been authorized by the FDA under an Emergency Use Authorization (EUA) for use by authorized laboratories. Fact sheet for Healthcare Providers: ConventionUpdate.co.nz  Fact sheet for Patients: ConventionUpdate.co.nz       Methodology: Isothermal Nucleic Acid Amplification                 Urinalysis Color   Date Value Ref Range Status   08/27/2021 ORANGE   Final     Appearance   Date Value Ref Range Status   08/27/2021 CLOUDY   Final     Specific gravity   Date Value Ref Range Status   08/27/2021 1.016 1.005 - 1.030   Final     pH (UA)   Date Value Ref Range Status   08/27/2021 5.0 5.0 - 8.0   Final     Protein   Date Value Ref Range Status   08/27/2021 30 (A) NEG mg/dL Final     Ketone   Date Value Ref Range Status   08/27/2021 Negative NEG mg/dL Final     Bilirubin   Date Value Ref Range Status   08/27/2021 Negative NEG   Final     Blood   Date Value Ref Range Status   08/27/2021 LARGE (A) NEG   Final     Urobilinogen   Date Value Ref Range Status   08/27/2021 0.2 0.2 - 1.0 EU/dL Final     Nitrites   Date Value Ref Range Status   08/27/2021 Negative NEG   Final     Leukocyte Esterase   Date Value Ref Range Status   08/27/2021 SMALL (A) NEG   Final     Potassium   Date Value Ref Range Status   08/29/2021 3.7 3.5 - 5.5 mmol/L Final     Creatinine   Date Value Ref Range Status   08/29/2021 1.63 (H) 0.6 - 1.3 MG/DL Final     BUN   Date Value Ref Range Status   08/29/2021 40 (H) 7.0 - 18 MG/DL Final     Prostate Specific Ag   Date Value Ref Range Status   11/25/2011 9.9 (H) 0.0 - 4.0 ng/mL Final     Comment:     Roche ECLIA methodology. According to the American Urological Association, Serum PSA should  decrease and remain at undetectable levels after radical  prostatectomy. The AUA defines biochemical recurrence as an initial  PSA value 0.2 ng/mL or greater followed by a subsequent confirmatory  PSA value 0.2 ng/mL or greater.   Values obtained with different assay methods or kits cannot be used  interchangeably. Results cannot be interpreted as absolute evidence  of the presence or absence of malignant disease. Performed At: [facility] [address]  [address], [place]  [id number]  Zee Mulligan MD  [id number]      PSA No results for input(s): PSA in the last 72 hours.    Coagulation Lab Results   Component Value Date/Time    Prothrombin time 13.5 09/29/2014 05:53 PM    Prothrombin time 12.4 09/18/2012 03:27 PM    INR 1.0 09/29/2014 05:53 PM    INR 0.9 09/18/2012 03:27 PM

## 2021-08-29 NOTE — PROGRESS NOTES
80- Received bedside report from [facility]. Pt in bed sleeping no distress noted. Pt in bed resting alert and oriented x to self and place denies pain at the moment. Assessement completed plan of care for the shift explained pt verbalized understanding. IVF infusing well, HOB elevated, bed low and in locked position. Call light and frequently used items within reach. Dove catheter in place draining yellow urine. 0400- Pt given a warm bath turned and repositioned in bed- dove urine output 800 ml yellow urine. Bed alarm on.  uneventful night. Bedside and Verbal shift change report given NAYANA Latham (oncoming nurse) by Julius Pathak RN (offgoing nurse). Report included the following information SBAR, Kardex, Intake/Output, MAR and Recent Results.

## 2021-08-29 NOTE — PROGRESS NOTES
Problem: Falls - Risk of  Goal: *Absence of Falls  Description: Document [place] Pyo Fall Risk and appropriate interventions in the flowsheet. Outcome: Progressing Towards Goal  Note: Fall Risk Interventions:  Mobility Interventions: Bed/chair exit alarm    Mentation Interventions: Adequate sleep, hydration, pain control    Medication Interventions: Bed/chair exit alarm    Elimination Interventions: Call light in reach       Problem: Patient Education: Go to Patient Education Activity  Goal: Patient/Family Education  Outcome: Progressing Towards Goal     Problem: Pressure Injury - Risk of  Goal: *Prevention of pressure injury  Description: Document Joe Scale and appropriate interventions in the flowsheet.   Outcome: Progressing Towards Goal  Note: Pressure Injury Interventions:  Sensory Interventions: Assess changes in LOC    Moisture Interventions: Absorbent underpads, Limit adult briefs    Activity Interventions: Assess need for specialty bed    Mobility Interventions: HOB 30 degrees or less    Nutrition Interventions: Discuss nutritional consult with provider    Friction and Shear Interventions: Apply protective barrier, creams and emollients       Problem: Nutrition Deficit  Goal: *Optimize nutritional status  Outcome: Progressing Towards Goal     Problem: Urinary Tract Infection - Adult  Goal: *Absence of infection signs and symptoms  Outcome: Progressing Towards Goal     Problem: Patient Education: Go to Patient Education Activity  Goal: Patient/Family Education  Outcome: Progressing Towards Goal

## 2021-08-29 NOTE — PROGRESS NOTES
Problem: Falls - Risk of  Goal: *Absence of Falls  Description: Document Ariel Wilton Fall Risk and appropriate interventions in the flowsheet. Outcome: Progressing Towards Goal  Note: Fall Risk Interventions:  Mobility Interventions: Assess mobility with egress test, Patient to call before getting OOB, PT Consult for mobility concerns    Mentation Interventions: Adequate sleep, hydration, pain control, More frequent rounding, Reorient patient, Door open when patient unattended, Bed/chair exit alarm    Medication Interventions: Patient to call before getting OOB, Evaluate medications/consider consulting pharmacy, Bed/chair exit alarm    Elimination Interventions: Bed/chair exit alarm, Call light in reach, Patient to call for help with toileting needs     Problem: Pressure Injury - Risk of  Goal: *Prevention of pressure injury  Description: Document Joe Scale and appropriate interventions in the flowsheet.   Outcome: Progressing Towards Goal  Note: Pressure Injury Interventions:  Sensory Interventions: Assess changes in LOC, Discuss PT/OT consult with provider, Keep linens dry and wrinkle-free, Monitor skin under medical devices, Pressure redistribution bed/mattress (bed type)    Moisture Interventions: Absorbent underpads    Activity Interventions: Assess need for specialty bed, Pressure redistribution bed/mattress(bed type)    Mobility Interventions: HOB 30 degrees or less, Pressure redistribution bed/mattress (bed type), PT/OT evaluation    Nutrition Interventions: Document food/fluid/supplement intake    Friction and Shear Interventions: Apply protective barrier, creams and emollients                Problem: Nutrition Deficit  Goal: *Optimize nutritional status  Outcome: Progressing Towards Goal     Problem: Urinary Tract Infection - Adult  Goal: *Absence of infection signs and symptoms  Outcome: Progressing Towards Goal     Problem: Patient Education: Go to Patient Education Activity  Goal: Patient/Family Education  Outcome: Progressing Towards Goal

## 2021-08-30 NOTE — PROGRESS NOTES
Reason for Admission:  UTI (urinary tract infection) [N39.0]  Urinary retention [R33.9]                 RUR Score:    14            Plan for utilizing home health:    yes                      Likelihood of Readmission:   LOW                         Transition of Care Plan:              Initial assessment completed with patient's POA Mesakai Ceballos. Cognitive status of patient: disoriented. Face sheet information confirmed:  yes. The patient's POA Mrs Pedro Newman participate in his discharge plan and to receive any needed information. This patient lives in a  home with adult caretaker and girlfriend. Patient is not able to navigate steps as needed. Prior to hospitalization, patient was considered to be independent with ADLs/IADLS : no . If not independent,  patient needs assist with : dressing, bathing, food preparation, cooking, toileting and grooming    Patient has a current ACP document on file: yes      Healthcare Decision Maker:   Primary Decision Maker: Bernarda Jerardo - Girlfriend - [phone]    Click here to complete [address] including selection of the Healthcare Decision Maker Relationship (ie \"Primary\")    The adult caretaker will be available to transport patient home upon discharge. The patient already has all DMEs as per Mrs Toshia Murray equipment available in the home. Patient is not currently active with home health. Patient has not stayed in a skilled nursing facility or rehab. Was  stay within last 60 days : no.    Per pt's POA, pt has personal care aids at home    This patient is on dialysis :no  List of available Home Health agencies were provided and reviewed with the patient prior to discharge. Freedom of choice signed: yes, for [address]. Currently, the discharge plan is Home with [address] [place] [address] Luciano Fonseca. The patient states that he can obtain his medications from the pharmacy, and take his medications as directed.     Patient's current insurance is Medicare, [facility]       Care Management Interventions  PCP Verified by CM: Yes  Palliative Care Criteria Met (RRAT>21 & CHF Dx)?: No  Mode of Transport at Discharge: Other (see comment)  Transition of Care Consult (CM Consult): Discharge Planning, [address]: Yes  Current Support Network:  Other, Has Personal Caregivers (lives with girlfriend)  Confirm Follow Up Transport: Family  The Patient and/or Patient Representative was Provided with a Choice of Provider and Agrees with the Discharge Plan?: Yes  Name of the Patient Representative Who was Provided with a Choice of Provider and Agrees with the Discharge Plan: pt' s [address] [place] [address] Dr of Choice List was Provided with Basic Dialogue that Supports the Patient's Individualized Plan of Care/Goals, Treatment Preferences and Shares the Quality Data Associated with the Providers?: Yes  Discharge Location  Discharge Placement: Home with home health        MARGE AggarwalN RN  Care Management  Pager: [phone]

## 2021-08-30 NOTE — PROGRESS NOTES
Problem: Falls - Risk of  Goal: *Absence of Falls  Description: Document [place] Pillow Fall Risk and appropriate interventions in the flowsheet.   8/30/2021 1159 by Ramesh Smith RN  Outcome: Resolved/Met  Note: Fall Risk Interventions:  Mobility Interventions: Assess mobility with egress test    Mentation Interventions: Adequate sleep, hydration, pain control    Medication Interventions: Patient to call before getting OOB    Elimination Interventions: Bed/chair exit alarm           8/30/2021 1159 by Ramesh Smith RN  Outcome: Progressing Towards Goal  Note: Fall Risk Interventions:  Mobility Interventions: Assess mobility with egress test    Mentation Interventions: Adequate sleep, hydration, pain control    Medication Interventions: Patient to call before getting OOB    Elimination Interventions: Bed/chair exit alarm

## 2021-08-30 NOTE — PROGRESS NOTES
Urology Progress Note        Assessment/Plan:     Assessment:   Hx BPH, AUR, gross hematuria   Difficult dove placement after multiple failed attempts    S/p bedside cysto, guide-wire and dove placement by Dr. Castro Shaver 2021   WBC normalized 11.4   Ucx NEG    Fever today 101    DALLIN   Creatinine 1.2<1.7   Baseline Creat 1.2  (In 10/2018)    Presented to the ED for weakness and AMS    Plan:    WBC has normalized, however fever today 101, pt asymptomatic   Ucx negative   Dove placed with bedside cysto, catheter over guide-wire    Urine draining well, yellow    Maintain catheter, Do not remove at discharge   CT reviewed, shows known BPH  Continue Flomax, Finasteride    Caregiver at bedside, pt should be d/c today  Signing off, available sooner if needed       Follow up arranged? Patient will need an outpatient voiding trial and cystoscopy for BPH, GH workup, msg already sent       Yovany Orourke NP-BC  [facility]   Pager [phone]      Subjective:     Daily Progress Note: 2021 12:51 PM    Gordon Dubon is doing well, asymptomatic, discharging today  Dove draining yellow urine with sediment   Caregiver states he has been more comfortable today     Objective:     Visit Vitals  BP (!) 92/52   Pulse 64   Temp 97.7 °F (36.5 °C)   Resp 16   Ht 5' 10\" (1.778 m)   Wt 66.7 kg (147 lb)   SpO2 95%   BMI 21.09 kg/m²        Temp (24hrs), Av °F (37.2 °C), Min:97.7 °F (36.5 °C), Max:101 °F (38.3 °C)      Intake and Output:  1901 -  0700  In: 240 [P.O.:240]  Out: 1800 [Urine:1800]  No intake/output data recorded. PHYSICAL EXAMINATION:   Visit Vitals  BP (!) 92/52   Pulse 64   Temp 97.7 °F (36.5 °C)   Resp 16   Ht 5' 10\" (1.778 m)   Wt 66.7 kg (147 lb)   SpO2 95%   BMI 21.09 kg/m²     Constitutional: Elderly male. No acute distress.     HEENT: Normocephalic, Atraumatic, EOM's intact   CV:  no edema  Respiratory: No respiratory distress or difficulties breathing Abdomen:  soft and nontender     Male:  No CVA tenderness  SCROTUM:  No scrotal rash or lesions noticed. Normal bilateral testes and epididymis. PENIS: Urethral meatus normal in location and size. No urethral discharge. Circumsized   16 Fr [place] dove placed over wired with cysto. Urine yellow with sediemnt Skin: No evidence of jaundice. Normal color  Neuro/Psych:  Alert and confused at baseline         Lab/Data Review: All lab results for the last 24 hours reviewed. CT A/P 8/27  IMPRESSION  1.  Distended gallbladder. No clear inflammatory change. However, if continued  clinical concern, consider nuclear medicine hepatobiliary scan or ultrasound for  further evaluation.     2.  Short segment narrowing of the colon with thick wall. This is nonspecific  but because represent a colonic lesion. Further evaluation with colonoscopy is  recommended.     3.  Prostatic hypertrophy.     4.  Mild prominence of the collecting system bilaterally which may represent  some element of bladder outlet issues.     Labs:     Labs: Results:   Chemistry    Recent Labs     08/30/21  0117 08/29/21  0200 08/28/21  0438   GLU 93 85 88    136 136   K 3.5 3.7 3.9    106 105   CO2 22 23 23   BUN 29* 40* 43*   CREA 1.20 1.63* 1.84*   CA 7.6* 7.4* 7.7*   AGAP 8 7 8   BUCR 24* 25* 23*      CBC w/Diff Recent Labs     08/30/21  0117 08/29/21  0200 08/28/21  0438   WBC 11.4 15.6* 15.5*   RBC 3.16* 3.07* 3.11*   HGB 9.5* 9.3* 9.4*   HCT 29.1* 28.8* 29.1*    228 256   GRANS 84* 88*  --    LYMPH 8* 7*  --    EOS 1 1  --       Cultures Recent Labs     08/27/21  1343   CULT No growth (<1,000 CFU/ML)     All Micro Results     Procedure Component Value Units Date/Time    COVID-19 RAPID TEST [[id number]] Collected: 08/30/21 1135    Order Status: Completed Specimen: Nasopharyngeal Updated: 08/30/21 1209     Specimen source Nasopharyngeal        COVID-19 rapid test Not detected        Comment: Rapid Abbott ID Now       Rapid NAAT:  The specimen is NEGATIVE for SARS-CoV-2, the novel coronavirus associated with COVID-19. Negative results should be treated as presumptive and, if inconsistent with clinical signs and symptoms or necessary for patient management, should be tested with an alternative molecular assay. Negative results do not preclude SARS-CoV-2 infection and should not be used as the sole basis for patient management decisions. This test has been authorized by the FDA under an Emergency Use Authorization (EUA) for use by authorized laboratories. Fact sheet for Healthcare Providers: [employer]  Fact sheet for Patients: [employer]       Methodology: Isothermal Nucleic Acid Amplification         CULTURE, BLOOD [[id number]] Collected: 08/27/21 0315    Order Status: Completed Specimen: Blood Updated: 08/30/21 0713     Special Requests: NO SPECIAL REQUESTS        Culture result: NO GROWTH 3 DAYS       CULTURE, BLOOD [[id number]] Collected: 08/27/21 0330    Order Status: Completed Specimen: Blood Updated: 08/30/21 0713     Special Requests: NO SPECIAL REQUESTS        Culture result: NO GROWTH 3 DAYS       CULTURE, URINE [[id number]] Collected: 08/27/21 1343    Order Status: Completed Specimen: Cath Urine Updated: 08/29/21 0736     Special Requests: NO SPECIAL REQUESTS        Culture result: No growth (<1,000 CFU/ML)       CULTURE, URINE [[id number]]     Order Status: Canceled Specimen: Cath Urine     COVID-19 RAPID TEST [[id number]] Collected: 08/27/21 0849    Order Status: Completed Specimen: Nasopharyngeal Updated: 08/27/21 0912     Specimen source Nasopharyngeal        COVID-19 rapid test Not detected        Comment: Rapid Abbott ID Now       Rapid NAAT:  The specimen is NEGATIVE for SARS-CoV-2, the novel coronavirus associated with COVID-19.        Negative results should be treated as presumptive and, if inconsistent with clinical signs and symptoms or necessary for patient management, should be tested with an alternative molecular assay. Negative results do not preclude SARS-CoV-2 infection and should not be used as the sole basis for patient management decisions. This test has been authorized by the FDA under an Emergency Use Authorization (EUA) for use by authorized laboratories. Fact sheet for Healthcare Providers: [employer]  Fact sheet for Patients: [employer]       Methodology: Isothermal Nucleic Acid Amplification                 Urinalysis Color   Date Value Ref Range Status   08/27/2021 ORANGE   Final     Appearance   Date Value Ref Range Status   08/27/2021 CLOUDY   Final     Specific gravity   Date Value Ref Range Status   08/27/2021 1.016 1.005 - 1.030   Final     pH (UA)   Date Value Ref Range Status   08/27/2021 5.0 5.0 - 8.0   Final     Protein   Date Value Ref Range Status   08/27/2021 30 (A) NEG mg/dL Final     Ketone   Date Value Ref Range Status   08/27/2021 Negative NEG mg/dL Final     Bilirubin   Date Value Ref Range Status   08/27/2021 Negative NEG   Final     Blood   Date Value Ref Range Status   08/27/2021 LARGE (A) NEG   Final     Urobilinogen   Date Value Ref Range Status   08/27/2021 0.2 0.2 - 1.0 EU/dL Final     Nitrites   Date Value Ref Range Status   08/27/2021 Negative NEG   Final     Leukocyte Esterase   Date Value Ref Range Status   08/27/2021 SMALL (A) NEG   Final     Potassium   Date Value Ref Range Status   08/30/2021 3.5 3.5 - 5.5 mmol/L Final     Creatinine   Date Value Ref Range Status   08/30/2021 1.20 0.6 - 1.3 MG/DL Final     BUN   Date Value Ref Range Status   08/30/2021 29 (H) 7.0 - 18 MG/DL Final     Prostate Specific Ag   Date Value Ref Range Status   11/25/2011 9.9 (H) 0.0 - 4.0 ng/mL Final     Comment:     Roche ECLIA methodology.   According to the American Urological Association, Serum PSA should  decrease and remain at undetectable levels after radical  prostatectomy. The AUA defines biochemical recurrence as an initial  PSA value 0.2 ng/mL or greater followed by a subsequent confirmatory  PSA value 0.2 ng/mL or greater. Values obtained with different assay methods or kits cannot be used  interchangeably. Results cannot be interpreted as absolute evidence  of the presence or absence of malignant disease. Performed At: [facility] [address], [place]  [id number]  Angeline Sam MD  [id number]      PSA No results for input(s): PSA in the last 72 hours.    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

## 2021-08-30 NOTE — DISCHARGE SUMMARY
Physician Discharge Summary       Patient: Clyde Purdy MRN: [id number]  SSN: [id number]    YOB: 1922  Age: 80 y.o. Sex: male    PCP: Ubaldo Pino MD    Allergies: Sulfa (sulfonamide antibiotics)    Admit date: 8/27/2021  Admitting Provider: Gerry Francis MD    Discharge date: 8/30/2021  Discharging Provider: Star Heart MD    * Admission Diagnoses: UTI (urinary tract infection) [N39.0]  Urinary retention [R33.9]    * Discharge Diagnoses:      1. Sepsis due to UTI/cystitis, improving  2. Acute urinary retention with BPH status post Du  3. Gross hematuria, improving  4. Acute UTI, better  5. Leukocytosis, stable  6. Fever 101.8F, resolved  7. Distended gallbladder but no RUQ pain or tenderness  8. Acute renal injury, prerenal azotemia, resolving  9. Hypertension    * Hospital Course: The patient was admitted to the hospital on August 27, 2021 with a complaint of not eating, not drinking and feels weak. Please refer hospital admission H&P for further detail. Patient also was found out to have urinary retention and was seen by urologist as he was hard Du catheter placement. Du catheter was placed and patient had some hematuria. Hematuria got better after bladder irrigation. Urology was following this patient. Patient also had a fever of 101.8. Blood culture x2 had urine culture x1 remained negative. Patient was treated with antibiotic empirically. Patient also leukocytosis that got better. Patient had distended gallbladder on CT scan but no tenderness or pain. Patient had renal failure that also got better. Patient's appetite was improving after treatment of his UTI infection. Patient did not want PEG tube. He also has signed DNR/DNI in 2014 and he wanted to stick with that. Patient's medical power of  was in agreement with the plan. Patient will be discharged home on the medications listed below.     * Procedures: None  * No surgery found *      Consults: Urology    Significant Diagnostic Studies: Chest x-ray, CT abdomen pelvis with contrast    Discharge Exam:  Please refer my progress note from August 30, 2021 for further detail    * Discharge Condition: improved  * Disposition: Home    Discharge Medications:  Current Discharge Medication List      START taking these medications    Details   finasteride (PROSCAR) 5 mg tablet Take 1 Tablet by mouth daily. Qty: 30 Tablet, Refills: 0  Start date: 8/31/2021      tamsulosin (FLOMAX) 0.4 mg capsule Take 1 Capsule by mouth daily. Qty: 30 Capsule, Refills: 0  Start date: 8/31/2021      cefpodoxime (VANTIN) 100 mg tablet Take 1 Tablet by mouth two (2) times a day for 5 days. Qty: 10 Tablet, Refills: 0  Start date: 8/30/2021, End date: 9/4/2021         CONTINUE these medications which have NOT CHANGED    Details   risperiDONE (RisperDAL) 0.5 mg tablet take 1 tablet by mouth every evening  Qty: 120 Tablet, Refills: 3      propranoloL (INDERAL) 10 mg tablet take 1 tablet by mouth twice a day  Qty: 180 Tab, Refills: 3      pantoprazole (PROTONIX) 40 mg tablet take 1 tablet by mouth once daily  Qty: 120 Tab, Refills: 3             * Follow-up Care/Patient Instructions: Activity: PT/OT per Home Health  Diet: Cardiac Diet  Wound Care: As directed, Du care    Follow-up Information     Follow up With Specialties Details Why Contact Info    Demetrius Fisher MD Internal Medicine   [address] [place] [address]          Follow-up Appointments   Procedures    FOLLOW UP VISIT Appointment in: Other ([facility]) Follow-up with PCP in 5 to 7 daysFollow-up with Dr. Kev Thornton, urologist, in 2 to 3 weeks for urinary retention     Follow-up with PCP in 5 to 7 daysFollow-up with Dr. Kev Thornton, urologist, in 2 to 3 weeks for urinary retention     Standing Status:   Standing     Number of Occurrences:   1     Order Specific Question:   Appointment in     Answer:    Other (Specify)     Disclaimer: Sections of this note are dictated using utilizing voice recognition software, which may have resulted in some phonetic based errors in grammar and contents. Even though attempts were made to correct all the mistakes, some may have been missed, and remained in the body of the document. If questions arise, please contact our department.     Total time of discharge greater than 35 minutes    Signed:  Usha Ovalle MD  8/30/2021  2:06 PM

## 2021-08-30 NOTE — PALLIATIVE CARE
[address] [facility] [phone]   [facility] [phone]    This LMSW consulted by Dr. Jose Luis Webb MD to assist with getting POST signed by family member, due to pt's inability to do so himself. LMSW obtained family members email and send docusign document for signature. LMSW will place document in chart once received. Thank you for this referral to Palliative Care. The Palliative Care team remains available to provide support to patient and his family while he is hospitalized.       Yovana Martinez, [facility]  Palliative Medicine Inpatient   [facility]  Palliative COPE Line: [phone])

## 2021-08-30 NOTE — PROGRESS NOTES
Hospitalist Progress Note    Subjective:   Daily Progress Note: 2021    Patient was seen in presence of caregiver. Patient denies any chest pain abdominal pain. He wants to go home. He says he can sign his paperwork. He does not want to be intubated as well as he does not want a PEG tube. Current Facility-Administered Medications   Medication Dose Route Frequency    cefepime (MAXIPIME) 1 g in sterile water (preservative free) 10 mL IV syringe  1 g IntraVENous Q24H    tamsulosin (FLOMAX) capsule 0.4 mg  0.4 mg Oral DAILY    propranoloL (INDERAL) tablet 10 mg  10 mg Oral BID    risperiDONE (RisperDAL) tablet 0.5 mg  0.5 mg Oral QPM    pantoprazole (PROTONIX) tablet 40 mg  40 mg Oral ACB/HS    sodium chloride (NS) flush 5-40 mL  5-40 mL IntraVENous Q8H    sodium chloride (NS) flush 5-40 mL  5-40 mL IntraVENous PRN    acetaminophen (TYLENOL) tablet 650 mg  650 mg Oral Q6H PRN    Or    acetaminophen (TYLENOL) suppository 650 mg  650 mg Rectal Q6H PRN    polyethylene glycol (MIRALAX) packet 17 g  17 g Oral DAILY PRN    promethazine (PHENERGAN) tablet 12.5 mg  12.5 mg Oral Q6H PRN    Or    ondansetron (ZOFRAN) injection 4 mg  4 mg IntraVENous Q6H PRN    finasteride (PROSCAR) tablet 5 mg  5 mg Oral DAILY        Review of Systems  Pertinent items are noted in HPI. Objective:     Visit Vitals  BP (!) 92/52   Pulse 64   Temp 97.7 °F (36.5 °C)   Resp 16   Ht 5' 10\" (1.778 m)   Wt 66.7 kg (147 lb)   SpO2 95%   BMI 21.09 kg/m²      O2 Device: None (Room air)    Temp (24hrs), Av °F (37.2 °C), Min:97.7 °F (36.5 °C), Max:101 °F (38.3 °C)      No intake/output data recorded.    1901 -  0700  In: 240 [P.O.:240]  Out: 1800 [Urine:1800]    General appearance -awake, follows commands appropriately, not in acute distress  Chest -clear air entry noted in bases, no wheezes  Heart - S1 and S2 normal  Abdomen - soft, nontender, nondistended, Bowel sounds present, Du catheter is in situ  Neurological -awake, follows commands appropriately, moves all extremities  Extremities - no pedal edema noted      Data Review    Recent Results (from the past 24 hour(s))   METABOLIC PANEL, BASIC    Collection Time: 08/30/21  1:17 AM   Result Value Ref Range    Sodium 139 136 - 145 mmol/L    Potassium 3.5 3.5 - 5.5 mmol/L    Chloride 109 100 - 111 mmol/L    CO2 22 21 - 32 mmol/L    Anion gap 8 3.0 - 18 mmol/L    Glucose 93 74 - 99 mg/dL    BUN 29 (H) 7.0 - 18 MG/DL    Creatinine 1.20 0.6 - 1.3 MG/DL    BUN/Creatinine ratio 24 (H) 12 - 20      GFR est AA >60 >60 ml/min/1.73m2    GFR est non-AA 56 (L) >60 ml/min/1.73m2    Calcium 7.6 (L) 8.5 - 10.1 MG/DL   MAGNESIUM    Collection Time: 08/30/21  1:17 AM   Result Value Ref Range    Magnesium 2.3 1.6 - 2.6 mg/dL   VANCOMYCIN, RANDOM    Collection Time: 08/30/21  1:17 AM   Result Value Ref Range    Vancomycin, random 16.4 5.0 - 40.0 UG/ML   CBC WITH AUTOMATED DIFF    Collection Time: 08/30/21  1:17 AM   Result Value Ref Range    WBC 11.4 4.6 - 13.2 K/uL    RBC 3.16 (L) 4.35 - 5.65 M/uL    HGB 9.5 (L) 13.0 - 16.0 g/dL    HCT 29.1 (L) 36.0 - 48.0 %    MCV 92.1 78.0 - 100.0 FL    MCH 30.1 24.0 - 34.0 PG    MCHC 32.6 31.0 - 37.0 g/dL    RDW 13.6 11.6 - 14.5 %    PLATELET 914 965 - 845 K/uL    MPV 11.5 9.2 - 11.8 FL    NEUTROPHILS 84 (H) 40 - 73 %    LYMPHOCYTES 8 (L) 21 - 52 %    MONOCYTES 7 3 - 10 %    EOSINOPHILS 1 0 - 5 %    BASOPHILS 0 0 - 2 %    ABS. NEUTROPHILS 9.5 (H) 1.8 - 8.0 K/UL    ABS. LYMPHOCYTES 0.9 0.9 - 3.6 K/UL    ABS. MONOCYTES 0.7 0.05 - 1.2 K/UL    ABS. EOSINOPHILS 0.2 0.0 - 0.4 K/UL    ABS. BASOPHILS 0.0 0.0 - 0.1 K/UL    DF AUTOMATED     COVID-19 RAPID TEST    Collection Time: 08/30/21 11:35 AM   Result Value Ref Range    Specimen source Nasopharyngeal      COVID-19 rapid test Not detected NOTD           Assessment/Plan:     ASSESSMENT:    1.  Sepsis due to UTI/cystitis, improving  2. Acute urinary retention with BPH status post Du  3. Gross hematuria, improving  4. Acute UTI, better  5. Leukocytosis, stable  6. Fever 101.8F, resolved  7. Distended gallbladder but no RUQ pain or tenderness  8. Acute renal injury, prerenal azotemia, resolving  9. Hypertension    PLAN:    Urine and blood cultures are negative  Rapid Covid is negative  Discussed with urologist yesterday: Continue Du catheter and outpatient follow-up  We will change patient to Vantin upon discharge  Continue Proscar and Flomax  Guarded prognosis  PT and OT to follow this patient  Discharge patient home today      Total time to take care of this patient was 25 minutes and more than 50% of time was spent counseling and coordinating care. Disclaimer: Sections of this note are dictated using utilizing voice recognition software, which may have resulted in some phonetic based errors in grammar and contents. Even though attempts were made to correct all the mistakes, some may have been missed, and remained in the body of the document. If questions arise, please contact our department.

## 2021-08-30 NOTE — PROGRESS NOTES
Discharge order noted for today. Pt has been accepted to [facility]. Met with patient and spoke with Deanna Thompson and are agreeable to the transition plan today. Transport has been arranged through pt's caregiver. Patient's discharge summary and home health  orders have been forwarded to [facility] home health  agency. Updated bedside RN, Niels Allen,  to the transition plan. Discharge information has been documented on the AVS.         Patient unable to understand and/or complete the \"Important Message from [employer]". Reviewed document with patient's representative Ambar Mclain at phone numbertelephonically and addressed questions. A copy of the IMM letter left at bedside with patient. Original, with verbal signature noted, placed in patient's chart.           LEIGH Babcock RN  Care Management  Pager: [phone]

## 2021-08-30 NOTE — DISCHARGE SUMMARY
[facility]  Face to Face Encounter    Patients Name: Fabi Lopez    YOB: 1922    Primary Diagnosis:     1. Sepsis due to UTI/cystitis, improving  2. Acute urinary retention with BPH status post Du  3. Gross hematuria, improving  4. Acute UTI, better  5. Leukocytosis, stable  6. Fever 101.8F, resolved  7. Distended gallbladder but no RUQ pain or tenderness  8. Acute renal injury, prerenal azotemia, resolving  9. Hypertension    Date of Face to Face:   August 30, 2021                                    Face to Face Encounter findings are related to primary reason for home care:   yes    1. I certify that the patient needs intermittent skilled nursing care, physical therapy and/or speech therapy. I will not be following this patient in the Community and Dr. Venkatesh Cruz MD  will be responsible for signing the [address] of Care. 2. Initial Orders for Care: Must be completed only if Face to Face MD will not be signing the [address] Skilled Nursing, Physical Therapy and Occupational Therapy    3. I certify that this patient is homebound for the following reason(s): Requires the assistance of 1 or more persons to leave the home  and Only leaves the home for medical reasons or Bahai services and are infrequent and of short duration for other reasons     4. I certify that this patient is under my care and that I, or a nurse practitioner or [phone] working with me, had a Face-to-Face Encounter that meets the physician Face-to-Face Encounter requirements. Document the physical findings from the Face-to-Face Encounter that support the need for skilled services: Has new medications that requires skilled nursing teaching and monitoring for understanding and compliance  and Has new finding of weakness and altered mobility that requires skilled physical/occupational and/or speech therapy services for evaluation and interventions. Fausto Ovalle MD  8/30/2021

## 2021-08-30 NOTE — PROGRESS NOTES
Problem: Falls - Risk of  Goal: *Absence of Falls  Description: Document [place] Fall Risk and appropriate interventions in the flowsheet.   Outcome: Progressing Towards Goal  Note: Fall Risk Interventions:  Mobility Interventions: Assess mobility with egress test    Mentation Interventions: Adequate sleep, hydration, pain control    Medication Interventions: Patient to call before getting OOB    Elimination Interventions: Bed/chair exit alarm              Problem: Patient Education: Go to Patient Education Activity  Goal: Patient/Family Education  Outcome: Progressing Towards Goal

## 2021-08-30 NOTE — PROGRESS NOTES
Physician Progress Note      PATIENT:               Charlie Khalil  CSN #:                  [id number]  :                       1922  ADMIT DATE:       2021 3:05 AM  DISCH DATE:  RESPONDING  PROVIDER #:        Yolie OVALLE MD          QUERY TEXT:    Patient admitted with  fever, leukocytosis, DALLIN. Noted documentation of sepsis in H&P  with UA and urine culture negative . Source of infection  unclear. In order to support the diagnosis of sepsis, please include additional clinical indicators in your documentation. Or please document if the diagnosis of sepsis has been ruled out after further study. The medical record reflects the following:  Risk Factors: Fever  Clinical Indicators: Documented UA and urine culture negative. WBC 14.4, Temp 101, Procalcitonin 7.40  Treatment: Antibiotics    Thank you  Brianna Goodrich RN [facility]  [phone]  Options provided:  -- Sepsis present as evidenced by, Please document evidence. -- Sepsis was ruled out after study  -- Other - I will add my own diagnosis  -- Disagree - Not applicable / Not valid  -- Disagree - Clinically unable to determine / Unknown  -- Refer to Clinical Documentation Reviewer    PROVIDER RESPONSE TEXT:    Sepsis was ruled out after study.     Query created by: Meenakshi Quesada on 2021 10:25 AM      Electronically signed by:  Julio César Berg MD 2021 11:21 AM

## 2021-08-30 NOTE — PROGRESS NOTES
Problem: Mobility Impaired (Adult and Pediatric)  Goal: *Acute Goals and Plan of Care (Insert Text)  Description: Physical Therapy Goals  Initiated 8/30/2021 and to be accomplished within 7 day(s)  1. Patient will move from supine to sit and sit to supine , scoot up and down, and roll side to side in bed with supervision/set-up. 2.  Patient will transfer from bed to chair and chair to bed with minimal assistance/contact guard assist using the least restrictive device. 3.  Patient will perform sit to stand with minimal assistance/contact guard assist.  4.  Patient will ambulate with minimal assistance/contact guard assist for 15 feet with the least restrictive device. PLOF: Pt's caregiver in room giving PLOF. Caregiver reporting he needs some assist at home for bed mobility and standing but able to ambulate with AD or without with supervision depending on the day. Caregiver reports they have a ramp to enter the home and wheelchair available if needed. Pt has 24/7 caregivers. Outcome: Progressing Towards Goal   PHYSICAL THERAPY EVALUATION    Patient: Melania Yan (68 y.o. male)  Date: 8/30/2021  Primary Diagnosis: UTI (urinary tract infection) [N39.0]  Urinary retention [R33.9]        Precautions:  Fall      ASSESSMENT :  Pt cleared to participate in PT session, pt received semi-reclined in bed and agreeable to therapy session. Based on the objective data described below, the patient presents with decreased endurance, decreased strength, decreased balance reactions, gait deviations, and decreased independence in functional mobility. Pt completing supine to sit with increased time and Jennie. Pt sitting EOB with good sitting balance. Pt then standing with Jennie to RW, pt unable to take steps forward but able to stand for ~60 seconds x2 reps. Pt requesting to return to supine with Jennie and scooting toward [facility] with totalA and assist from caregiver.  Pt positioned for comfort and educated to call for assist before getting up, pt verbalized understanding. Pt left with all needs met and call bell in reach. RN notified of position and participation. Patient will benefit from skilled intervention to address the above impairments. Patient's rehabilitation potential is considered to be Fair  Factors which may influence rehabilitation potential include:   []         None noted  [x]         Mental ability/status  [x]         Medical condition  []         Home/family situation and support systems  []         Safety awareness  []         Pain tolerance/management  []         Other:      PLAN :  Recommendations and Planned Interventions:   [x]           Bed Mobility Training             []    Neuromuscular Re-Education  [x]           Transfer Training                   []    Orthotic/Prosthetic Training  [x]           Gait Training                          []    Modalities  [x]           Therapeutic Exercises           []    Edema Management/Control  [x]           Therapeutic Activities            [x]    Family Training/Education  [x]           Patient Education  []           Other (comment):    Frequency/Duration: Patient will be followed by physical therapy 1-2 times per day/4-7 days per week to address goals. Discharge Recommendations: Home Health with 24/7 assist (needing increased assistance compared to prior to admission) vs Rehab  Further Equipment Recommendations for Discharge: hospital bed, wheelchair     SUBJECTIVE:   Patient stated Bowen Maribel can do it.     OBJECTIVE DATA SUMMARY:     Past Medical History:   Diagnosis Date    Allergy     Anxiety states     Arthritis     Benign essential hypertension     Depressive disorder     Diverticulitis     Dizziness and giddiness     Elevated prostate specific antigen (PSA)     Glaucoma     Gross hematuria     Hypertrophy of prostate with urinary obstruction and other lower urinary tract symptoms (LUTS)     Nocturia     Osteoarthrosis involving multiple sites     Tremor      Past Surgical History:   Procedure Laterality Date    HX APPENDECTOMY      HX HERNIA REPAIR      HX OTHER SURGICAL      rectal fissure surgery    HX OTHER SURGICAL Bilateral     eye surgery     Barriers to Learning/Limitations: yes;  physical and altered mental status (i.e.Sedation, Confusion)  Compensate with: Visual Cues, Verbal Cues, and Tactile Cues  Home Situation:  Home Situation  Home Environment: Private residence  # Steps to Enter:  (ramp )  One/Two Story Residence: One story  Living Alone: No  Support Systems: Home care staff  Patient Expects to be Discharged to[de-identified] House  Current DME Used/Available at Home: [place] Behavior:  Neurologic State: Drowsy  Orientation Level: Oriented to person  Cognition: Follows commands  Safety/Judgement: Fall prevention  Psychosocial  Patient Behaviors: Calm  Family  Behaviors: Supportive  Visitor Behaviors: Anxious  Purposeful Interaction: Yes  Pt Identified Daily Priority: Clinical issues (comment)  Caritas Process: Nurture loving kindness  Caring Interventions: Therapeutic modalities  Therapeutic Modalities: Intentional therapeutic touch  Skin Condition/Temp: Warm  Family  Behaviors: Supportive  Skin Integrity: Other (comment)  Skin Integumentary  Skin Color: Ecchymosis (comment)  Skin Condition/Temp: Warm  Skin Integrity: Other (comment)     Strength:    Strength: Generally decreased, functional    Tone & Sensation:   Tone: Normal    Sensation: Intact    Range Of Motion:  AROM: Generally decreased, functional       Posture:  Posture (WDL): Exceptions to WDL  Posture Assessment: Forward head;Trunk flexion  Functional Mobility:  Bed Mobility:     Supine to Sit: Minimum assistance  Sit to Supine: Contact guard assistance  Scooting: Total assistance  Transfers:  Sit to Stand: Minimum assistance  Stand to Sit: Contact guard assistance    Balance:   Sitting: Intact  Standing: Impaired; With support  Standing - Static: Fair  Standing - Dynamic : Fair    Ambulation/Gait Training:     Assistive Device: Walker, rolling  Ambulation - Level of Assistance: Contact guard assistance     Gait Description (WDL): Exceptions to WDL  Gait Abnormalities: Decreased step clearance    Base of Support: Narrowed; Center of gravity altered     Speed/Pamella: Slow;Shuffled       Pain:  Pain level pre-treatment: 0/10   Pain level post-treatment: 0/10     Activity Tolerance:   Fair tolerance    Please refer to the flowsheet for vital signs taken during this treatment. After treatment:   []         Patient left in no apparent distress sitting up in chair  [x]         Patient left in no apparent distress in bed  [x]         Call bell left within reach  [x]         Nursing notified  [x]         Caregiver present  [x]         Bed alarm activated  []         SCDs applied    COMMUNICATION/EDUCATION:   [x]         Role of Physical Therapy in the acute care setting. [x]         Fall prevention education was provided and the patient/caregiver indicated understanding. [x]         Patient/family have participated as able in goal setting and plan of care. [x]         Patient/family agree to work toward stated goals and plan of care. []         Patient understands intent and goals of therapy, but is neutral about his/her participation. []         Patient is unable to participate in goal setting/plan of care: ongoing with therapy staff.  []         Other:     Thank you for this referral.  Carolina Lakhani, PT   Time Calculation: 23 mins      Eval Complexity: History: MEDIUM  Complexity : 1-2 comorbidities / personal factors will impact the outcome/ POC Exam:LOW Complexity : 1-2 Standardized tests and measures addressing body structure, function, activity limitation and / or participation in recreation  Presentation: LOW Complexity : Stable, uncomplicated  Clinical Decision Making:Low Complexity low  Overall Complexity:LOW

## 2021-08-30 NOTE — CONSULTS
Palliative Medicine Consult    Patient Name: Clyde Purdy  YOB: 1922    Date of Initial Consult: 8/30/2021  Reason for Consult: Goals of care discussions  Requesting Provider: Dr. Javi Allison  Primary Care Physician: Ubaldo Pino MD      SUMMARY:   Clyde Purdy is a 80 y. o. with a past history of hypertension, depression, tremor, advanced age, BPH with LUTS, and osteoarthritis, who was admitted on 8/27/2021 from home with a diagnosis of UTI with sepsis, acute urinary retention with BPH status post Du, gross hematuria, leukocytosis, fever, and acute renal injury. Current medical issues leading to Palliative Medicine involvement include: Support and goals of care discussions. PALLIATIVE DIAGNOSES:   1. Goals of care discussions  2. Sepsis/UTI  3. Acute urinary retention with BPH  4. Advanced age/debility       PLAN:   1. Goals of care discussions: Palliative medicine team including JOS Short MD, Shyanne Reardon [facility], and I met with patient at patient's bedside. Patient had a caregiver present. Patient is quite drowsy but did state earlier that he wants DNR/DNI. Patient has also stated that he would not want feeding tubes. Patient has a durable DNR on file. Patient is unable to sign POST Form due to drowsiness and weakness. Discussed with BREEZY Cifuentes the importance of POST form completion to protect against intubation and feeding tubes. Faxed form x3 without success. Emailed POST form via [facility] for electronic signature with the following measures: DNR/DNI, limited interventions, no feeding tubesawaiting return of signature. Patient is expected to discharge today. Encouraged Lilli to return the signed document ASAP. Support offered. 2. Sepsis UTI: Presented with poor appetite and feeling weak. He also was found to have urinary retention and was seen by urologist as he was a hard Du catheter placement. Du catheter was placed and patient had hematuria.   Hematuria improved after bladder irrigation. Urology is following the patient. Patient was treated with antibiotics empirically though blood cultures x2 and urine culture x1 remain negative. Acute kidney injury improved. Patient's appetite is also improving after treatment for UTI. 3. Acute urinary retention with BPH: See #2  4. Advanced age/debility: 42-year-old male who needs assistance with ADLs. Patient lives at home and has a caregiver and personal care aides at home. 5. Initial consult note routed to primary continuity provider  6. Communicated plan of care with: Palliative IDT       GOALS OF CARE / TREATMENT PREFERENCES:   [====Goals of Care====]  GOALS OF CARE: DNR/DNI, limited interventions, no feeding tubes  Patient/Health Care Proxy Stated Goals: Prolong life      TREATMENT PREFERENCES:   Code Status: DNR    Advance Care Planning:  Advance Care Planning 8/27/2021   Patient's Healthcare Decision Maker is: Named in scanned ACP document   Confirm Advance Directive Yes, on file       Medical Interventions: Limited additional interventions    Artificially Administered Nutrition: No feeding tube      The palliative care team has discussed with patient / health care proxy about goals of care / treatment preferences for patient.  [====Goals of Care====]         HISTORY:     History obtained from: Patient's chart, BREEZY Llamas    CHIEF COMPLAINT: Drowsy    HPI/SUBJECTIVE:    The patient is:   [] Verbal and participatory  [x] Non-participatory due to:   Drowsy, will open eyes briefly to verbal stimuli but goes right back to sleep    Clinical Pain Assessment (nonverbal scale for severity on nonverbal patients): Adult Nonverbal Pain Scale  Face: No particular expression or smile  Activity (Movement): Lying quietly, normal position  Guarding: Lying quietly, no positioning of hands over areas of body  Physiology (Vital Signs):  Stable vital signs  Respiratory: Baseline RR/SpO2 compliant with ventilator  Total Score: 0 FUNCTIONAL ASSESSMENT:     Palliative Performance Scale (PPS):  PPS: 40       PSYCHOSOCIAL/SPIRITUAL SCREENING:     Advance Care Planning:  Advance Care Planning 8/27/2021   Patient's Healthcare Decision Maker is: Named in scanned ACP document   Confirm Advance Directive Yes, on file        Any spiritual / Hinduism concerns: Unable to assess at this time  [] Yes /  [] No    Caregiver Burnout:  [] Yes /  [x] No /  [] No Caregiver Present      Anticipatory grief assessment: Unable to assess at this time  [] Normal  / [] Maladaptive          REVIEW OF SYSTEMS:     Positive and pertinent negative findings in ROS are noted above in HPI. The following systems were [] reviewed / [x] unable to be reviewed as noted in HPI  Other findings are noted below. Systems: constitutional, ears/nose/mouth/throat, respiratory, gastrointestinal, genitourinary, musculoskeletal, integumentary, neurologic, psychiatric, endocrine. Positive findings noted below. Modified ESAS Completed by: provider                                            PHYSICAL EXAM:     From RN flowsheet:  Wt Readings from Last 3 Encounters:   08/27/21 66.7 kg (147 lb)   11/06/14 74.7 kg (164 lb 12 oz)   10/01/14 75.1 kg (165 lb 8 oz)     Blood pressure (!) 92/52, pulse 64, temperature 97.7 °F (36.5 °C), resp. rate 16, height 5' 10\" (1.778 m), weight 66.7 kg (147 lb), SpO2 95 %.     Pain Scale 1: Numeric (0 - 10)  Pain Intensity 1: 0                   Constitutional: Resting in bed with eyes closed, no acute distress, appears chronically ill  Eyes: Closed  ENMT: no nasal discharge, moist mucous membranes  Cardiovascular: regular rhythm, distal pulses intact  Respiratory: breathing not labored, symmetric  Gastrointestinal: soft non-tender, +bowel sounds  Musculoskeletal: no deformity, no tenderness to palpation  Skin: warm, dry  Neurologic: Resting comfortably, will open eyes briefly to verbal stimuli but he is quite drowsy  Psychiatric: Drowsy affect, no hallucinations       HISTORY:     Active Problems:    Urinary retention (8/27/2021)      UTI (urinary tract infection) (8/27/2021)      Sepsis ([address] (8/27/2021)      Leukocytosis (8/27/2021)      Fever (8/27/2021)      Past Medical History:   Diagnosis Date    Allergy     Anxiety states     Arthritis     Benign essential hypertension     Depressive disorder     Diverticulitis     Dizziness and giddiness     Elevated prostate specific antigen (PSA)     Glaucoma     Gross hematuria     Hypertrophy of prostate with urinary obstruction and other lower urinary tract symptoms (LUTS)     Nocturia     Osteoarthrosis involving multiple sites     Tremor       Past Surgical History:   Procedure Laterality Date    HX APPENDECTOMY      HX HERNIA REPAIR      HX OTHER SURGICAL      rectal fissure surgery    HX OTHER SURGICAL Bilateral     eye surgery      Family History   Problem Relation Age of Onset    No Known Problems Mother     No Known Problems Father     Hypertension Maternal Grandfather     Stroke Maternal Grandfather       History reviewed, no pertinent family history.   Social History     Tobacco Use    Smoking status: Former Smoker    Smokeless tobacco: Never Used   Substance Use Topics    Alcohol use: Yes     Comment: occasional     Allergies   Allergen Reactions    Sulfa (Sulfonamide Antibiotics) Other (comments)     Dizziness/ passed out      Current Facility-Administered Medications   Medication Dose Route Frequency    cefepime (MAXIPIME) 1 g in sterile water (preservative free) 10 mL IV syringe  1 g IntraVENous Q24H    tamsulosin (FLOMAX) capsule 0.4 mg  0.4 mg Oral DAILY    propranoloL (INDERAL) tablet 10 mg  10 mg Oral BID    risperiDONE (RisperDAL) tablet 0.5 mg  0.5 mg Oral QPM    pantoprazole (PROTONIX) tablet 40 mg  40 mg Oral ACB/HS    sodium chloride (NS) flush 5-40 mL  5-40 mL IntraVENous Q8H    sodium chloride (NS) flush 5-40 mL  5-40 mL IntraVENous PRN    acetaminophen (TYLENOL) tablet 650 mg  650 mg Oral Q6H PRN    Or    acetaminophen (TYLENOL) suppository 650 mg  650 mg Rectal Q6H PRN    polyethylene glycol (MIRALAX) packet 17 g  17 g Oral DAILY PRN    promethazine (PHENERGAN) tablet 12.5 mg  12.5 mg Oral Q6H PRN    Or    ondansetron (ZOFRAN) injection 4 mg  4 mg IntraVENous Q6H PRN    finasteride (PROSCAR) tablet 5 mg  5 mg Oral DAILY          LAB AND IMAGING FINDINGS:     Lab Results   Component Value Date/Time    WBC 11.4 08/30/2021 01:17 AM    HGB 9.5 (L) 08/30/2021 01:17 AM    PLATELET 492 76/42/2052 01:17 AM     Lab Results   Component Value Date/Time    Sodium 139 08/30/2021 01:17 AM    Potassium 3.5 08/30/2021 01:17 AM    Chloride 109 08/30/2021 01:17 AM    CO2 22 08/30/2021 01:17 AM    BUN 29 (H) 08/30/2021 01:17 AM    Creatinine 1.20 08/30/2021 01:17 AM    Calcium 7.6 (L) 08/30/2021 01:17 AM    Magnesium 2.3 08/30/2021 01:17 AM      Lab Results   Component Value Date/Time    Alk. phosphatase 104 08/27/2021 03:30 AM    Protein, total 8.0 08/27/2021 03:30 AM    Albumin 3.3 (L) 08/27/2021 03:30 AM    Globulin 4.7 (H) 08/27/2021 03:30 AM     Lab Results   Component Value Date/Time    INR 1.0 09/29/2014 05:53 PM    Prothrombin time 13.5 09/29/2014 05:53 PM      Lab Results   Component Value Date/Time    Iron 37 (L) 09/29/2014 05:53 PM    TIBC 241 (L) 09/29/2014 05:53 PM    Iron % saturation 15 09/29/2014 05:53 PM      No results found for: PH, PCO2, PO2  No components found for: [place]   Lab Results   Component Value Date/Time    CK 71 09/30/2014 06:08 AM    CK - MB 1.2 09/30/2014 06:08 AM                Total time: 30 minutes   Counseling / coordination time, spent as noted above: 25 minutes  > 50% counseling / coordination?: yes, patient and family. Prolonged service was provided for  []30 min   []75 min in face to face time in the presence of the patient, spent as noted above.   Time Start:   Time End:   Note: this can only be billed with 02412 (initial) or [id number] (follow up). If multiple start / stop times, list each separately.

## 2021-08-30 NOTE — HOME CARE
Received home health referral for [facility] for (SN, PT, OT). Spoke with [address] Next of [facility] 69 via phone who is a LPN herself;  patient identifiers verified. Explained home care services and routines. Demographics verified including insurance, phone and address confirmed. Patient has the following DME: rollator,  2 wheel walker and bars in restroom for safety. Caregivers available 24 hour care. Orders noted and arranged to be processed to central intake.

## 2021-08-30 NOTE — ACP (ADVANCE CARE PLANNING)
Advance Care Planning     General Advance Care Planning (ACP) Conversation      Date of Conversation: 8/27/2021  Conducted with: Patient with Decision Making Capacity and Healthcare Decision Maker: Named in Advance Directive or Healthcare Power of [address]:   Primary Decision Maker: Yves Son - Next farhat [address] - [phone]    Primary Decision Maker: Mackenzie Jaime - Girlfriend - [phone]  Click here to complete [address] including selection of the Healthcare Decision Maker Relationship (ie \"Primary\")      Today we documented Decision Maker(s) consistent with Legal Next of Kin hierarchy. Content/Action Overview:    Has ACP document(s) on file - reflects the patient's care preferencesReviewed DNR/DNI       LEIGH Clifford RN  Care Management  Pager: [phone]

## 2021-08-30 NOTE — PROGRESS NOTES
Problem: Falls - Risk of  Goal: *Absence of Falls  Description: Document Rip Sites Fall Risk and appropriate interventions in the flowsheet.   8/30/2021 1159 by Rea Oliver RN  Outcome: Resolved/Met  Note: Fall Risk Interventions:  Mobility Interventions: Assess mobility with egress test    Mentation Interventions: Adequate sleep, hydration, pain control    Medication Interventions: Patient to call before getting OOB    Elimination Interventions: Bed/chair exit alarm           8/30/2021 1159 by Rea Oliver RN  Outcome: Progressing Towards Goal  Note: Fall Risk Interventions:  Mobility Interventions: Assess mobility with egress test    Mentation Interventions: Adequate sleep, hydration, pain control    Medication Interventions: Patient to call before getting OOB    Elimination Interventions: Bed/chair exit alarm

## 2021-08-31 NOTE — PROGRESS NOTES
Physician Progress Note      PATIENT:               Bret Etienne  CSN #:                  [id number]  :                       1922  ADMIT DATE:       2021 3:05 AM  DISCH DATE:        2021 5:13 PM  RESPONDING  PROVIDER #:        Marcelo OVALLE MD          QUERY TEXT:    Patient admitted with urinary retention and gross hematuria . Noted documentation of UTI/cystitis  in H&P and discharge summary  and  . Ua and urine culture negative . In order to support the diagnosis of UTI , please include additional clinical indicators in your documentation. Or please document if the diagnosis of UTI has been ruled out after further study. The medical record reflects the following:  Risk Factors: Urinary retention  Clinical Indicators: Documented \" Urine and blood cultures are negative\". Treatment: Patient was treated with antibiotic empirically. Thank You  Kim Gleason RN [facility]  [phone]  Options provided:  -- UTI, present as evidenced by, Please document evidence. -- UTI was ruled out  -- Other - I will add my own diagnosis  -- Disagree - Not applicable / Not valid  -- Disagree - Clinically unable to determine / Unknown  -- Refer to Clinical Documentation Reviewer    PROVIDER RESPONSE TEXT:    UTI was ruled out after study.     Query created by: Margarita Cantrell on 2021 8:54 AM      Electronically signed by:  Manuel Mejia MD 2021 8:58 AM

## 2021-09-01 NOTE — CASE COMMUNICATION
Dear Dr. Mathur Fears,     This message is to inform you that your patient, Pedro Gracia,  22, has been admitted to  Home PT services through [facility]. It was determined that pt would benefit from Home PT 2w3, 1w1 to maximize strength, gait, balance, endurance, stair training, and safety. PT to be sending fax for 3-in-1 commode order. Any questions please contact me at [phone].     Sincerely,     Jennie Pena, PT

## 2021-09-01 NOTE — HOME HEALTH
ADMISSION OASIS  Admission Summary: Mr. Caryle Grandchild is a 80year old male being admitted to home health for PT/SN/OT services. Pt was admitted to [facility] for sepsis/UTI. Pt has temporary dove cath placed. The patient's caregiver/representative present (Scotty Learn - aide), & able and willing to provide care daily thorugh 24 hour care of agency. Patient/CG participated in goal setting and care planning and are agreeable to the care plan. Discharge planning/training was initiated for maximizing overall function and returning pt to PLOF. Admission booklet reviewed with patient; including review of rights and responsibilities, discharge/transfer policies, and contact information for , , Medicare, [facility], and outside resources for independence. PMHx:  Allergy    Anxiety states    Arthritis    Benign essential hypertension    Depressive disorder    Diverticulitis    Dizziness and giddiness    Elevated prostate specific antigen (PSA)    Glaucoma    Gross hematuria    Hypertrophy of prostate with urinary obstruction and other lower urinary tract symptoms (LUTS)    Nocturia    Osteoarthrosis involving multiple sites    Tremor   SUBJECTIVE:  Pt alert and oriented to self, ,  but not day/month,or  address. Pt was able to follow commands and answer PT questions appropriate with CG assist.  Pt/CG denies falls. LIVING SITUATION: lives with 24 hour live in aides through agency. REQUIRES CAREGIVER ASSISTANCE FOR: meals, ADLs, transportation, medications, ambulation, laundry, cleaning  PLOF:Pt lives in 1 level home with steps to enter with live in aides 24/day. Pt was needing some set up/assist with ADLs PTA and the aides were managing his medications, meals, laundry, and cleaning of home. Pt was indep with taking self to bathroom and toileting and ambulating with rollator at all times.    DME: FWW, rollator, wheelchair  MEDICATIONS REVIEWED AND UPDATED: Medications reconciled and all medications are available in the home this visit. The following education was provided regarding medications, medication interactions, and look a like medications: Continue medication as prescribed by MD. Medications are effective at this time. NEXT MD APPT: Urology 9/9/21  ROM: limitations at end range of knee extension and shoulder flexion  STRENGTH:  see strength section for details. WOUNDS: no open areas noted or reported. Pt with dry skin. BED MOBILITY: sit to sup with mod indep with increased time and effort. TRANSFERS: sit to stand from chair with CGA with multiple attempts and bed SBA,  off toilet mod/min A but cues for proper hand placement. PT placed FWW over the toilet backwards so pt could use the arms of FWW to push off instead of towel rack that is broken and loose. PT to fax MD for Commode. GAIT: pt ambulated 61' x2 with rollator with flexed forward posture and shuffling gait pattern. SBA with cues for upright posture. Declined TUG testing due to fatigue. STAIRS: NT  BALANCE: Tinetti not tested due to pt refusal and fatigue following functional assessment. Pt with fair balance with rollator. PATIENT EDUCATION PROVIDED THIS VISIT: safety for transfers and hand placement, walking with upright posture, ambulation as tolerate    HEP consisting of:  1. Walking every 2-3 hours during the day with    patient/caregiver verbalized understanding. CONTINUED NEED FOR THE FOLLOWING SKILLS: HH PT is medically necessary to address pain,  decreased strength, decreased independence with functional transfers, impaired gait, impaired stair negotiation, and impaired balance in order to improve functional independence, quality of life, return to PLOF, and reduce the risk for falls. PLAN: 2w3, 1w1. PT fax Dr. Tia Sharif for 3-in1 commode.      DISCHARGE PLANNING DISCUSSED: Discharge to self and family under MD supervision once all goals have been met or patient has reached max potential.

## 2021-09-01 NOTE — Clinical Note
Pt admitted to home health today following hospital stay for s/p sepsis/uti/urinary retention and dove placement with Nursing services. Meds in the home were reviewed against the dc med list from hospital and no discrepancies were noted. All meds are in home and correct. See visit note in epic for full visit documentation. Awaiting orders for dove management after pt sees Dr. Valladares Ran on 9/9.    Thank you  Jordy Nunez RN, BSN

## 2021-09-01 NOTE — CASE COMMUNICATION
Therapy Functional Score Assessment  Question Score          Grooming 2            Upper Dressing 2           Lower Dressing 3    Bathing 5           Toilet Transfer 2          Transfer 2            Ambulation 3           Dyspnea 2           Pain Interfering with activity 1        Est number therapy visits 7

## 2021-09-02 NOTE — HOME HEALTH
Patient education provided this visit to include: Pt is 79 y/o male s/p ED admission for sepsis, UTI, urinary retention, not eating, drinking and weakness. Pt had 16F dove placed 8/27 in ED. Md noted that dove was a hard placement and took 4 attemps. Pt has 16F dove w/10cc ballon draining clear, yellow urine. No s/s of UTI or hematura. Pt will f/u with PCP Jeronimo Friday on 9/15 and has appt with Barbie Prior 9/9 for dove assessment. Teaching on dove care, medication, and hydration and nutrition during visit. Clean dove catheter area with soap and water daily. pt educated on the importance of staying hydrated and drinking water througout the day  DRINK 8-10 EIGHT OZ GLASSES OF WATER PER DAY. patient aware to monitor for effectiveness and to notify staff of any adverse reactions to medications/any changes to medication regimen. reviewed side effects, purposes, dosage, frequencies. Patient is a fall risk. Educated pateint to sit on the side of the chair/bed, take a slow deep breaths, have feet firmly planted before standing up, use cane/walker/wheelchair if available, or have someone to assist.  Instruct patient/caregiver in methods to conserve energy. TAKE FREQUENT BREAKS, USE ASSISTIVE DEVICE. Pt educated to eat a nutritious, high protein diet and consume 5-6 small meals per day. patient made aware to monitor for s/s of infection [increased swelling, increased redness around site, increased pain, foul smelling drainage, fever] aware who to report to/when. Instruct patient/caregiver on good hygiene, complete bladder emptying, and avoiding use of powders or lotions. Instruct on how to recognize symptoms of urinary tract infection including elevated temperature, changes in mental status or confusion, frequency of urination, pain, hesitancy and urgency, malodorous urine, urine that has changed in color or clarity.  THE PATIENT AND CG WERE RECOMMENDED TO CALL PCP OR TAKE PT TO THE ER WITH ANY FEVER 101 OR ABOVE, CHILLS, SOB, CHEST PAIN, SEVERE HEADACHE, PROFUSE VOMITING AND/OR DIARRHEA, ACUTE PAIN OR ANY OTHER ACUTE CHANGE    Patient/caregiver degree of understanding:good    Home exercise program/Homework provided: continue home exercises program as developed by physical therapy       Pt/Caregiver instructed on plan of care and are agreeable to plan of care at this time. Md appt with PCP 9/15 AND UROLOGY AT 9/9    Discharge planning discussed with patient and caregiver. Discharge planning as follows: Will discharge from nursing when education is complete, patient is medically stable and dove is discontinued or managed differently. Pt/Caregiver did verbalize understanding of discharge planning. Patient/caregiver encouraged/instructed to keep appointment as lack of follow through with physician appointment could result in discontinuation of home care services for non-compliance. Skilled services/Home bound verification:  FOR [address] [place] [address]    Skilled Reason for admission/summary of clinical condition: SEPSIS/UTI AND URINARY RETENTION   This patient is homebound for the following reasons Requires considerable and taxing effort to leave the home , Requires the assistance of 1 or more persons to leave the home  and Only leaves the home for medical reasons or [employer] services and are infrequent and of short duration for other reasons . Caregiver: friend. patients cg is private aide and is available as needed or assistance with IADL's, ADL's, meal prep, medication management, and taking patient to all doctors appointments. Medications reconciled and all medications are available in the home this visit. The following education was provided regarding medications, medication interactions, and look alike medications (specify):   vantin, flomax  Medications  are effective at this time.       High risk medication teaching regarding anticoagulants, hyperglycemic agents or opiod narcotics performed (specify) none no discrepancies/medication interactions noted    Home health supplies by type and quantity ordered/delivered this visit include: will wait till pt sees urologist on 9/9, awaiting orders

## 2021-09-07 NOTE — PROGRESS NOTES
Physician Progress Note      PATIENT:               Nitish Sena  CSN #:                  [id number]  :                       1922  ADMIT DATE:       2021 3:05 AM  DISCH DATE:        2021 5:13 PM  RESPONDING  PROVIDER #:        Julissa OVALLE MD          QUERY TEXT:    Patient had a sodium of 135. . No specific evaluation outside of BMP monitoring and fluids (directed toward DALLIN) was done Or please document if the diagnosis of Hyponatremia  has been ruled out after further study. [[ Hyponatremia is clinically significant[de-identified] This patient has clinically significant Hyponatremia    The medical record reflects the following:    Risk Factors: DALLIN    Clinical Indicators: Sodium of 135    Treatment: No specific evaluation outside of BMP monitoring and fluids (directed toward DALLIN) was done    Thank You  Gladis Quispe RN [facility]  [phone]  Options provided:  -- Hyponatremia  not clinically significant  -- Other - I will add my own diagnosis  -- Disagree - Not applicable / Not valid  -- Disagree - Clinically unable to determine / Unknown  -- Refer to Clinical Documentation Reviewer    PROVIDER RESPONSE TEXT:    Hyponatremia is not clinically significant.     Query created by: Mike Hernandez on 2021 8:53 AM      Electronically signed by:  Linda Dubose MD 2021 9:30 AM

## 2021-09-09 NOTE — Clinical Note
Thanks for update. I know Luz Elena Zuniga said he had been declining as well. Suezanne Dance  ----- Message -----  From: Ponce Meyer OTR/L  Sent: 9/13/2021   7:02 AM EDT  To: Jalil Hannon, PT      OT evaluation completed 9.9.21. pt with poor to no participation and minimal communication during evaluation this day. pt declined participation ADL assessment and declined participation bed mobility/positioning/EOB as well as any other transfers. pt D for bathing, dressing, grooming, and toileting tasks. pt D for bed mobility and positioning as well as EOB, WC, and SPT. pt caregiver demonstrated good understanding pressure ulcer prevetion use pillows as well as transfer techniques. pt/caregiver declined UB HEP. pt BUE AROM WFL with 3+ to 4-/5 MMT grossly. pt has hired aide care 24 hours per day who provides A all I/ADL tasks and transfers as needed. pt declined continued OT. pt POA Aundrea Bravo aware pt refusal and agrees OT D/C per pt decline. pt POA aware contact MD for referral when pt wants to participate in therapy. D/C OT per pt decline. D/C pt to care of Dr Sandie Ojeda.

## 2021-09-09 NOTE — HOME HEALTH
SUBJECTIVE: PCA notes patient seems weaker and more difficult with walking. He has not been eating as well and having increase bowel movements. CAREGIVER INVOLVEMENT/ASSISTANCE NEEDED FOR: Patient resides with PCA daily to assist with ADL's and transfers. HOME HEALTH SUPPLIES BY TYPE AND QUANTITY ORDERED/DELIVERED THIS VISIT INCLUDE: na    OBJECTIVE: See interventions. ASSESSMENT OF PROGRESS TOWARD GOALS: Patient is progressing towards goals. Patient with limited activity today due to increase weakness. Patient transferred sit<->stand mod I with verbal/tactile cues for hand placement. PCA instructed on HEP and to increase functional mobility and strength of LE's. CONTINUED NEED FOR THE FOLLOWING SKILLS: Continuation of PT to progress with gait training and transfers to decrease fall risk. Patient needs reinforcement on proper technique with transfers. Patient would benefit from focus on balance in standing and sitting. PLAN FOR NEXT VISIT: Increase ambulation distance with walker next visit. THE FOLLOWING DISCHARGE PLANNING WAS DISCUSSED WITH THE PATIENT/CAREGIVER: D/C from HHPT in 2w3 when goal are met or max potential benefit achieved. Other: Fax request sent to PCP in regards to tim lift and hospital bed.

## 2021-09-09 NOTE — Clinical Note
no problem. ----- Message -----  From: Estrada Contreras, PT  Sent: 9/13/2021   8:28 AM EDT  To: Susi Kemp OTR/L  Subject: RE:                                                Thanks for update. I know Luz Elena Zuniga said he had been declining as well. Suezanne Dance  ----- Message -----  From: Ponce Meyer OTR/L  Sent: 9/13/2021   7:02 AM EDT  To: Jalil Hannon, PT      OT evaluation completed 9.9.21. pt with poor to no participation and minimal communication during evaluation this day. pt declined participation ADL assessment and declined participation bed mobility/positioning/EOB as well as any other transfers. pt D for bathing, dressing, grooming, and toileting tasks. pt D for bed mobility and positioning as well as EOB, WC, and SPT. pt caregiver demonstrated good understanding pressure ulcer prevetion use pillows as well as transfer techniques. pt/caregiver declined UB HEP. pt BUE AROM WFL with 3+ to 4-/5 MMT grossly. pt has hired aide care 24 hours per day who provides A all I/ADL tasks and transfers as needed. pt declined continued OT. pt JONOA Aundrea Bravo aware pt refusal and agrees OT D/C per pt decline. pt POA aware contact MD for referral when pt wants to participate in therapy. D/C OT per pt decline. D/C pt to care of Dr Sandie Ojeda.

## 2021-09-09 NOTE — Clinical Note
OT evaluation completed 9.9.21. pt with poor to no participation and minimal communication during evaluation this day. pt declined participation ADL assessment and declined participation bed mobility/positioning/EOB as well as any other transfers. pt D for bathing, dressing, grooming, and toileting tasks. pt D for bed mobility and positioning as well as EOB, WC, and SPT. pt caregiver demonstrated good understanding pressure ulcer prevetion use pillows as well as transfer techniques. pt/caregiver declined UB HEP. pt BUE AROM WFL with 3+ to 4-/5 MMT grossly. pt has hired aide care 24 hours per day who provides A all I/ADL tasks and transfers as needed. pt declined continued OT. pt POA [place] aware pt refusal and agrees OT D/C per pt decline. pt POA aware contact MD for referral when pt wants to participate in therapy. D/C OT per pt decline. D/C pt to care of Dr Margie Beauchamp.

## 2021-09-10 NOTE — ED PROVIDER NOTES
EMERGENCY DEPARTMENT HISTORY AND PHYSICAL EXAM    I have evaluated the patient at 7:19 PM      Date: 9/10/2021  Patient Name: Rony Sandhu    History of Presenting Illness     Chief Complaint   Patient presents with    Unable To Void         History Provided By: Caregiver  Location/Duration/Severity/Modifying factors   77-year-old male presenting to the emergency department for evaluation of inability to void. Patient had his Du catheter removed this morning by the home care nurse. Since then he has not had any episodes of voiding. Furthermore, patient has been exhibiting ongoing diarrhea being discharged from the hospital.  He was recently hospitalized here for generalized weakness and encephalopathy secondary to urinary tract infection. He was released on the 30th. Is likely that patient could have developed C. difficile colitis. patient self has dementia at baseline and is largely noncontributory to the HPI. However, he reports no complaints to me when asked. PCP: Carli Burns MD    Current Outpatient Medications   Medication Sig Dispense Refill    metroNIDAZOLE (FlagyL) 500 mg tablet Take 1 Tablet by mouth three (3) times daily for 10 days. Indications: diarrhea from an infection with Clostridium difficile bacteria 30 Tablet 0    finasteride (PROSCAR) 5 mg tablet Take 1 Tablet by mouth daily. 30 Tablet 0    tamsulosin (FLOMAX) 0.4 mg capsule Take 1 Capsule by mouth daily. 30 Capsule 0    risperiDONE (RisperDAL) 0.5 mg tablet take 1 tablet by mouth every evening 120 Tablet 3    propranoloL (INDERAL) 10 mg tablet take 1 tablet by mouth twice a day 180 Tab 3    pantoprazole (PROTONIX) 40 mg tablet take 1 tablet by mouth once daily (Patient taking differently: Take 40 mg by mouth ACB/HS.  take 1 tablet by mouth once daily) 120 Tab 3       Past History     Past Medical History:  Past Medical History:   Diagnosis Date    Allergy     Anxiety states     Arthritis     Benign essential hypertension     Depressive disorder     Diverticulitis     Dizziness and giddiness     Elevated prostate specific antigen (PSA)     Glaucoma     Gross hematuria     Hypertrophy of prostate with urinary obstruction and other lower urinary tract symptoms (LUTS)     Nocturia     Osteoarthrosis involving multiple sites     Tremor        Past Surgical History:  Past Surgical History:   Procedure Laterality Date    HX APPENDECTOMY      HX HERNIA REPAIR      HX OTHER SURGICAL      rectal fissure surgery    HX OTHER SURGICAL Bilateral     eye surgery       Family History:  Family History   Problem Relation Age of Onset    No Known Problems Mother     No Known Problems Father     Hypertension Maternal Grandfather     Stroke Maternal Grandfather        Social History:  Social History     Tobacco Use    Smoking status: Former Smoker    Smokeless tobacco: Never Used   Substance Use Topics    Alcohol use: Yes     Comment: occasional    Drug use: Not on file       Allergies: Allergies   Allergen Reactions    Sulfa (Sulfonamide Antibiotics) Other (comments)     Dizziness/ passed out         Review of Systems       Review of Systems   Constitutional: Negative for activity change, chills, diaphoresis, fatigue and fever. Respiratory: Negative for cough, chest tightness, shortness of breath, wheezing and stridor. Cardiovascular: Negative for chest pain and palpitations. Gastrointestinal: Positive for diarrhea. Negative for abdominal distention, abdominal pain, constipation, nausea and vomiting. Endocrine: Negative for polydipsia, polyphagia and polyuria. Genitourinary: Positive for decreased urine volume and difficulty urinating. Negative for dysuria and hematuria. Musculoskeletal: Negative for back pain, joint swelling and myalgias. Skin: Negative for rash. Neurological: Negative for dizziness, weakness and headaches. Psychiatric/Behavioral: Negative for agitation.  The patient is not nervous/anxious. Physical Exam     Visit Vitals  BP (!) 123/59 (BP 1 Location: Left upper arm, BP Patient Position: At rest)   Pulse 61   Temp 98.5 °F (36.9 °C)   Resp 16   Wt 63.5 kg (140 lb)   SpO2 96%   BMI 20.09 kg/m²         Physical Exam  Constitutional:       General: He is not in acute distress. Appearance: He is not toxic-appearing. HENT:      Head: Normocephalic and atraumatic. Mouth/Throat:      Mouth: Mucous membranes are moist.   Eyes:      Extraocular Movements: Extraocular movements intact. Pupils: Pupils are equal, round, and reactive to light. Cardiovascular:      Rate and Rhythm: Normal rate and regular rhythm. Heart sounds: Normal heart sounds. No murmur heard. No friction rub. No gallop. Pulmonary:      Effort: Pulmonary effort is normal.      Breath sounds: Normal breath sounds. Abdominal:      General: There is no distension. Palpations: Abdomen is soft. There is no mass. Tenderness: There is no abdominal tenderness. There is no guarding. Hernia: No hernia is present. Musculoskeletal:         General: No swelling, tenderness or deformity. Cervical back: Normal range of motion and neck supple. Skin:     General: Skin is warm and dry. Findings: No rash. Neurological:      General: No focal deficit present. Mental Status: He is alert. Mental status is at baseline. Cranial Nerves: No cranial nerve deficit.    Psychiatric:         Mood and Affect: Mood normal.           Diagnostic Study Results     Labs -  Recent Results (from the past 12 hour(s))   METABOLIC PANEL, COMPREHENSIVE    Collection Time: 09/10/21  8:45 PM   Result Value Ref Range    Sodium 137 136 - 145 mmol/L    Potassium 3.5 3.5 - 5.5 mmol/L    Chloride 106 100 - 111 mmol/L    CO2 25 21 - 32 mmol/L    Anion gap 6 3.0 - 18 mmol/L    Glucose 96 74 - 99 mg/dL    BUN 39 (H) 7.0 - 18 MG/DL    Creatinine 1.75 (H) 0.6 - 1.3 MG/DL    BUN/Creatinine ratio 22 (H) 12 - 20      GFR est AA 44 (L) >60 ml/min/1.73m2    GFR est non-AA 36 (L) >60 ml/min/1.73m2    Calcium 7.8 (L) 8.5 - 10.1 MG/DL    Bilirubin, total 0.3 0.2 - 1.0 MG/DL    ALT (SGPT) 76 (H) 16 - 61 U/L    AST (SGOT) 73 (H) 10 - 38 U/L    Alk. phosphatase 100 45 - 117 U/L    Protein, total 7.3 6.4 - 8.2 g/dL    Albumin 2.0 (L) 3.4 - 5.0 g/dL    Globulin 5.3 (H) 2.0 - 4.0 g/dL    A-G Ratio 0.4 (L) 0.8 - 1.7     MAGNESIUM    Collection Time: 09/10/21  8:45 PM   Result Value Ref Range    Magnesium 2.2 1.6 - 2.6 mg/dL   C. DIFFICILE AG & TOXIN A/B    Collection Time: 09/10/21  8:45 PM   Result Value Ref Range    PCR Reflex See Reflex order for C. difficile (DNA)      INTERPRETATION (A) NTXCD       Indeterminate for Toxigenic C. difficile, DNA confirmation to follow.    EKG, 12 LEAD, INITIAL    Collection Time: 09/10/21  8:58 PM   Result Value Ref Range    Ventricular Rate 69 BPM    Atrial Rate 69 BPM    P-R Interval 208 ms    QRS Duration 66 ms    Q-T Interval 430 ms    QTC Calculation (Bezet) 460 ms    Calculated P Axis 44 degrees    Calculated R Axis -33 degrees    Calculated T Axis 11 degrees    Diagnosis       Normal sinus rhythm  Left axis deviation  Inferior infarct , age undetermined  Abnormal ECG  When compared with ECG of 27-AUG-2021 03:48,  QRS duration has decreased  Borderline criteria for Lateral infarct are no longer present  Inferior infarct is now present  ST elevation now present in Lateral leads     CBC WITH AUTOMATED DIFF    Collection Time: 09/10/21  9:35 PM   Result Value Ref Range    WBC 11.3 4.6 - 13.2 K/uL    RBC 2.94 (L) 4.35 - 5.65 M/uL    HGB 8.5 (L) 13.0 - 16.0 g/dL    HCT 26.5 (L) 36.0 - 48.0 %    MCV 90.1 78.0 - 100.0 FL    MCH 28.9 24.0 - 34.0 PG    MCHC 32.1 31.0 - 37.0 g/dL    RDW 13.5 11.6 - 14.5 %    PLATELET 810 602 - 501 K/uL    MPV 10.0 9.2 - 11.8 FL    NEUTROPHILS 76 (H) 40 - 73 %    LYMPHOCYTES 12 (L) 21 - 52 %    MONOCYTES 7 3 - 10 %    EOSINOPHILS 3 0 - 5 %    BASOPHILS 0 0 - 2 % ABS. NEUTROPHILS 8.6 (H) 1.8 - 8.0 K/UL    ABS. LYMPHOCYTES 1.4 0.9 - 3.6 K/UL    ABS. MONOCYTES 0.8 0.05 - 1.2 K/UL    ABS. EOSINOPHILS 0.4 0.0 - 0.4 K/UL    ABS. BASOPHILS 0.0 0.0 - 0.1 K/UL    DF AUTOMATED         Radiologic Studies -   XR CHEST PORT   Final Result      Low lung volumes with probable atelectasis. Medical Decision Making   I am the first provider for this patient. I reviewed the vital signs, available nursing notes, past medical history, past surgical history, family history and social history. Vital Signs-Reviewed the patient's vital signs. Leftward axis      EKG: Normal sinus rhythm without evidence of ST elevation or depression. Records Reviewed: Nursing Notes, Old Medical Records, Previous electrocardiograms, Previous Radiology Studies and Previous Laboratory Studies (Time of Review: 7:19 PM)    ED Course: Progress Notes, Reevaluation, and Consults:         Provider Notes (Medical Decision Making):   MDM  Number of Diagnoses or Management Options  Diarrhea, unspecified type  Urinary retention  Diagnosis management comments: Patient presents to the emergency department for evaluation of diarrhea and inability to void. He appears in no acute distress. Vital signs here are stable. He is afebrile. He has a soft nontender abdominal exam.  He does appear somewhat dehydrated. We will obtain screening lab work and C. difficile toxin assay. Will have to reinsert Du catheter. Bladder scan >400cc. 2210:  Multiple unsuccessful attempts at placing Du catheter by nursing staff here. Will call urology. Patient's blood work is notable for worsening kidney function with creatinine of 1.75 today. This may be due to his retention versus dehydration from diarrhea. We will give him fluids. C. difficile antigen was inconclusive, he will be administered Flagyl empirically. Discussed with hospitalist for potential admission.   Given that the patient's vitals signs are stable and that he has no acute leukocytosis, he can be treated on an outpatient basis for C. Difficile. 0106:  Urology has placed a new Du catheter. Patient will be discharged with antibiotics to treat his diarrhea and outpatient basis. Follow-up with primary care doctor and urology advised. ED return precautions discussed. Diagnosis     Clinical Impression:   1. Diarrhea, unspecified type    2. Urinary retention        Disposition: home    Follow-up Information     Follow up With Specialties Details Why Contact Info    [facility] EMERGENCY DEPT Emergency Medicine  As needed, If symptoms worsen [address] [place] [address] [address] [facility]    Delroes Goss MD Internal Medicine Call   [address] [place] [address]      Urology of [address] [place]  [phone]           Patient's Medications   Start Taking    METRONIDAZOLE (FLAGYL) 500 MG TABLET    Take 1 Tablet by mouth three (3) times daily for 10 days. Indications: diarrhea from an infection with Clostridium difficile bacteria   Continue Taking    FINASTERIDE (PROSCAR) 5 MG TABLET    Take 1 Tablet by mouth daily. PANTOPRAZOLE (PROTONIX) 40 MG TABLET    take 1 tablet by mouth once daily    PROPRANOLOL (INDERAL) 10 MG TABLET    take 1 tablet by mouth twice a day    RISPERIDONE (RISPERDAL) 0.5 MG TABLET    take 1 tablet by mouth every evening    TAMSULOSIN (FLOMAX) 0.4 MG CAPSULE    Take 1 Capsule by mouth daily. These Medications have changed    No medications on file   Stop Taking    No medications on file     Disclaimer: Sections of this note are dictated using utilizing voice recognition software. Minor typographical errors may be present. If questions arise, please do not hesitate to contact me or call our department.

## 2021-09-10 NOTE — ED TRIAGE NOTES
Presents via medic c/o inability to void. Per medics pt had dove catheter removed today and has been unable to void urine since. Pt is alert and in NAD. Oriented to self only.

## 2021-09-10 NOTE — HOME HEALTH
Clinical Condition per EPIC:  \"HPI: Rainer Sampson is a 80 y.o. male with medical co-morbidities including HTN, depression, tremor, advanced age, presented from home due to concern for not eating/drinking and generalized fatigue in the last 2 days. He stated he does not feels well. His caretaker at bedside confirmed that he has not as active as he was. He has eating less and less talkative. He has been laying in bed and not doing much. No report of fever/chill at home. No sick contact. In the ER, he was found to have fever, leukocytosis, DALLIN, he was given IV fluid and IV antibiotics. He had acute urinary retention which required Urology consult for dove placement. CT abd/pelv without a clear evidence of pathology but gallbladder distended without inflammatory change, nonspecific colon finding. [place] Hospital Course: The patient was admitted to the hospital on August 27, 2021 with a complaint of not eating, not drinking and feels weak. Please refer hospital admission H&P for further detail. Patient also was found out to have urinary retention and was seen by urologist as he was hard Dove catheter placement. Dove catheter was placed and patient had some hematuria. Hematuria got better after bladder irrigation. Urology was following this patient. Patient also had a fever of 101.8. Blood culture x2 had urine culture x1 remained negative. Patient was treated with antibiotic empirically. Patient also leukocytosis that got better. Patient had distended gallbladder on CT scan but no tenderness or pain. Patient had renal failure that also got better. Patient's appetite was improving after treatment of his UTI infection. Patient did not want PEG tube. He also has signed DNR/DNI in 2014 and he wanted to stick with that. Patient's medical power of  was in agreement with the plan. Patient will be discharged home on the medications listed below.       Past Medical History:  Diagnosis Date  o Allergy    o Anxiety states o Arthritis    o Benign essential hypertension    o Depressive disorder    o Diverticulitis    o Dizziness and giddiness    o Elevated prostate specific antigen (PSA)    o Glaucoma    o Gross hematuria    o Hypertrophy of prostate with urinary obstruction and other lower urinary tract symptoms (LUTS)    o Nocturia    o Osteoarthrosis involving multiple sites    o Tremor        Past Surgical History:  Procedure Laterality Date  o HX APPENDECTOMY      o HX HERNIA REPAIR      o HX OTHER SURGICAL        rectal fissure surgery  o HX OTHER SURGICAL Bilateral      eye surgery\"  . Subjective: pt stated \"It was nice to meet you but no thank you. \" pt with head knods during evaluation and very minimal verbalization. pt lying bed with aide changing his soiled brief upon OT arrival. pt agreed tx. OT instructed pt what OT purpose was and what it would address. OT questioned if pt wanted to continue. pt knodded head no. OT questioned pt again to ensure pt did not want therapy and pt stated \"No.\" pt aide called pt BENIGNO Perez of pt declining skilled OT. pt POA stated \"If he doesn't want it he doesn't have to have it. \" POA aware of OT D/C this day due to pt decline. pt aide agrees with pt decline and D/C OT. Caregiver involvement: pt hired aide provides A ADL tasks and transfers PRN, performs all IADL tasks, shopping, transportation, and A with medications. Medications reconciled and all medications are available in the home this visit. The following education was provided regarding medications, medication interactions, and look a like medications: dosages and adverse reactions. Medications are effective at this time.  OT reconcilled medications with pt with no changes   Home health supplies by type and quantity ordered/delivered this visit include: NA   Patient education provided this visit: OT role, energy conservation, fall prevention/safety training ADL tasks, transfer training, pressure ulcer relief  Rehab potential: pt declined OT. Home exercise program: pt/caregiver declined UB HEP  Continued need for the following skills: Physical Therapy and Nursing   The following discharge planning was discussed with the pt/caregiver: OT evaluation completed 9.9.21. pt with poor to no participation and minimal communication during evaluation this day. pt declined participation ADL assessment and declined participation bed mobility/positioning/EOB as well as any other transfers. pt D for bathing, dressing, grooming, and toileting tasks. pt D for bed mobility and positioning as well as EOB, WC, and SPT. pt caregiver demonstrated good understanding pressure ulcer prevetion use pillows as well as transfer techniques. pt/caregiver declined UB HEP. pt BUE AROM WFL with 3+ to 4-/5 MMT grossly. pt has hired aide care 24 hours per day who provides A all I/ADL tasks and transfers as needed. pt declined continued OT. pt POA Frankie Houston aware pt refusal and agrees OT D/C per pt decline. pt POA aware contact MD for referral when pt wants to participate in therapy. D/C OT per pt decline. D/C pt to care of Dr Kat Dillon.

## 2021-09-10 NOTE — HOME HEALTH
SUBJECTIVE: PCA present during session. Patient observed sitting on sofa upon arrival for PT. CAREGIVER INVOLVEMENT/ASSISTANCE NEEDED FOR: PCA 24/7 to assist with ADL's and transfers. HOME HEALTH SUPPLIES BY TYPE AND QUANTITY ORDERED/DELIVERED THIS VISIT INCLUDE: na    OBJECTIVE: See interventions. ASSESSMENT OF PROGRESS TOWARD GOALS: Patient ambulated throughout home 2x ~15 feet with ~3 minute rest break during/post ambulation due to fatigue. Patient/CG instructed on HEP 3x/day to improve functional mobility and strength of LE's. Patient transferred sit<->stand with mod A due to weakness and instability with initial standing. Patient required verbal/tactile cueing for proper technique with transfers. CONTINUED NEED FOR THE FOLLOWING SKILLS: Continuation of PT to further improve patients sitting and standing balance to decrease fall risk. Patient needs reinforcement on safety with transfers and ambulation. PLAN FOR NEXT VISIT: Increase ambulation distance and attempt stair training next visit. THE FOLLOWING DISCHARGE PLANNING WAS DISCUSSED WITH THE PATIENT/CAREGIVER: D/C from HHPT 2w3, 1w1 when goals are met or max potential benefit achieved.

## 2021-09-11 NOTE — DISCHARGE INSTRUCTIONS
Take the prescribed antibiotic 3 times a day as directed for the next 10 days. Return to the emergency department for any new or worsening symptoms. follow-up with urology regarding your Du catheter. Drink plenty of fluids.

## 2021-09-11 NOTE — CONSULTS
1. Diarrhea, unspecified type    2. Urinary retention        ASSESSMENT: Marily Bower is a 80 y.o. male:   1. Urinary retention  2. BPH  3. Failed multiple attempts at placing Du in the ED by nursing staff    I performed a random bladder scan on the patient in the ER360 cc  Du placed easily 16 Italian Du coudé. Urine draining clear yellow. See cystoscopy note        PLAN:  1.  Maintain Flomax and finasteride  2. Do not DC Du  3. Set up home health aide or nursing home staff to have Du changed every month. 4.  Send urine for UA and culture and if positive treat with culture specific antibiotics. Mayco Valderrama MD    Office: [phone]  Pager: [phone]    September 11, 2021    Chief Complaint   Patient presents with   Facundo Nakai Unable To Void       HISTORY OF PRESENT ILLNESS:  Marily Bower is a 80 y.o. male who is seen in consultation as referred by Dr. Anthony Rodriguez  In ED for urinary retention and difficulty placing Du catheter by nursing staff. Patient has a history of BPH. His nursing home. He presented to emergency room over 2 weeks ago on 8/27/2021 and a Du catheter was placed over guidewire by urology. The Du was removed yesterday so over 20 hours for unknown reasons and patient has been unable to void. He complains of lower abdominal discomfort. He was brought to emergency room. Multiple attempts at Du catheter placement by nursing staff was unsuccessful. He denies any hematuria, dysuria. Patient is nonambulatory over the last month. He also reports diarrhea. No flowsheet data found.       Past Medical History:   Diagnosis Date    Allergy     Anxiety states     Arthritis     Benign essential hypertension     Depressive disorder     Diverticulitis     Dizziness and giddiness     Elevated prostate specific antigen (PSA)     Glaucoma     Gross hematuria     Hypertrophy of prostate with urinary obstruction and other lower urinary tract symptoms (LUTS)  Nocturia     Osteoarthrosis involving multiple sites     Tremor        Past Surgical History:   Procedure Laterality Date    HX APPENDECTOMY      HX HERNIA REPAIR      HX OTHER SURGICAL      rectal fissure surgery    HX OTHER SURGICAL Bilateral     eye surgery       Social History     Tobacco Use    Smoking status: Former Smoker    Smokeless tobacco: Never Used   Substance Use Topics    Alcohol use: Yes     Comment: occasional    Drug use: Not on file       Allergies   Allergen Reactions    Sulfa (Sulfonamide Antibiotics) Other (comments)     Dizziness/ passed out       Family History   Problem Relation Age of Onset    No Known Problems Mother     No Known Problems Father     Hypertension Maternal Grandfather     Stroke Maternal Grandfather        Current Outpatient Medications   Medication Sig Dispense Refill    metroNIDAZOLE (FlagyL) 500 mg tablet Take 1 Tablet by mouth three (3) times daily for 10 days. Indications: diarrhea from an infection with Clostridium difficile bacteria 30 Tablet 0    finasteride (PROSCAR) 5 mg tablet Take 1 Tablet by mouth daily. 30 Tablet 0    tamsulosin (FLOMAX) 0.4 mg capsule Take 1 Capsule by mouth daily. 30 Capsule 0    risperiDONE (RisperDAL) 0.5 mg tablet take 1 tablet by mouth every evening 120 Tablet 3    propranoloL (INDERAL) 10 mg tablet take 1 tablet by mouth twice a day 180 Tab 3    pantoprazole (PROTONIX) 40 mg tablet take 1 tablet by mouth once daily (Patient taking differently: Take 40 mg by mouth ACB/HS.  take 1 tablet by mouth once daily) 120 Tab 3       Review of Systems  Not obtainable due to patient factors n/a    Negative for: Ophthalmologic issues, ENT issues, Cardiovascular issues, respiratory issues, GI issues, neurologic issues, hematoogic issues, skin lesions, musculoskeletal issues, psychiatric issues  Exceptions: N/A    Positive for:    Urinary retention      PHYSICAL EXAMINATION:   Visit Vitals  BP (!) 123/59 (BP 1 Location: Left upper arm, BP Patient Position: At rest)   Pulse 61   Temp 98.5 °F (36.9 °C)   Resp 16   Wt 140 lb (63.5 kg)   SpO2 96%   BMI 20.09 kg/m²     Constitutional: Well developed, well nourished male. No acute distress. HEENT: Normocephalic, Atraumatic, EOM's intact   CV:  RRR   Respiratory: No respiratory distress or difficulties breathing   Abdomen:  No abdominal masses, palpable bladder below the umbilicus. Slight tenderness. No acute abdomen.  Male:  No CVA tenderness  SCROTUM:  No scrotal rash or lesions noticed. Normal bilateral testes and epididymis. PENIS: Urethral meatus normal in location and size. No urethral discharge. Circumsized   Skin: No evidence of jaundice. Normal color  Neuro/Psych:  Alert and oriented. Affect appropriate. Lymphatic:   No enlarged inguinal lymph nodes. REVIEW OF LABS AND IMAGING:  All labs and images reviewed    Labs: Results:   Chemistry    Recent Labs     09/10/21  2045   GLU 96      K 3.5      CO2 25   BUN 39*   CREA 1.75*   CA 7.8*   AGAP 6   BUCR 22*      TP 7.3   ALB 2.0*   GLOB 5.3*   AGRAT 0.4*      CBC w/Diff Recent Labs     09/10/21  2135   WBC 11.3   RBC 2.94*   HGB 8.5*   HCT 26.5*      GRANS 76*   LYMPH 12*   EOS 3      Cultures No results for input(s): CULT in the last 72 hours. All Micro Results     Procedure Component Value Units Date/Time    C. DIFFICILE AG & TOXIN A/B [[id number]]  (Abnormal) Collected: 09/10/21 2045    Order Status: Completed Specimen: Stool Updated: 09/10/21 2159     PCR Reflex       See Reflex order for C. difficile (DNA)           INTERPRETATION       Indeterminate for Toxigenic C. difficile, DNA confirmation to follow.           Esequiel Paniagua DIFF MOLECULAR [[id number]] Collected: 09/10/21 2045    Order Status: Completed Updated: 09/10/21 2159    CULTURE, URINE [[id number]]     Order Status: Canceled Specimen: Cath Urine             Urinalysis Color   Date Value Ref Range Status   08/27/2021 ORANGE   Final Appearance   Date Value Ref Range Status   08/27/2021 CLOUDY   Final     Specific gravity   Date Value Ref Range Status   08/27/2021 1.016 1.005 - 1.030   Final     pH (UA)   Date Value Ref Range Status   08/27/2021 5.0 5.0 - 8.0   Final     Protein   Date Value Ref Range Status   08/27/2021 30 (A) NEG mg/dL Final     Ketone   Date Value Ref Range Status   08/27/2021 Negative NEG mg/dL Final     Bilirubin   Date Value Ref Range Status   08/27/2021 Negative NEG   Final     Blood   Date Value Ref Range Status   08/27/2021 LARGE (A) NEG   Final     Urobilinogen   Date Value Ref Range Status   08/27/2021 0.2 0.2 - 1.0 EU/dL Final     Nitrites   Date Value Ref Range Status   08/27/2021 Negative NEG   Final     Leukocyte Esterase   Date Value Ref Range Status   08/27/2021 SMALL (A) NEG   Final     Potassium   Date Value Ref Range Status   09/10/2021 3.5 3.5 - 5.5 mmol/L Final     Creatinine   Date Value Ref Range Status   09/10/2021 1.75 (H) 0.6 - 1.3 MG/DL Final     BUN   Date Value Ref Range Status   09/10/2021 39 (H) 7.0 - 18 MG/DL Final     Prostate Specific Ag   Date Value Ref Range Status   11/25/2011 9.9 (H) 0.0 - 4.0 ng/mL Final     Comment:     Roche ECLIA methodology. According to the American U[employer] Association, Serum PSA should  decrease and remain at undetectable levels after radical  prostatectomy. The AUA defines biochemical recurrence as an initial  PSA value 0.2 ng/mL or greater followed by a subsequent confirmatory  PSA value 0.2 ng/mL or greater. Values obtained with different assay methods or kits cannot be used  interchangeably. Results cannot be interpreted as absolute evidence  of the presence or absence of malignant disease. Performed At: [facility] [address], [place]  [id number]  Zee Mulligan MD  [id number]      PSA No results for input(s): PSA in the last 72 hours.    Coagulation Lab Results   Component Value Date/Time    Prothrombin time 13.5 09/29/2014 05:53 PM    Prothrombin time 12.4 09/18/2012 03:27 PM    INR 1.0 09/29/2014 05:53 PM    INR 0.9 09/18/2012 03:27 PM         Micro  No results for input(s): SDES, CULT in the last 72 hours. No results for input(s): CULT in the last 72 hours. US Results: (Most Recent) No results found for this or any previous visit. CT (Most Recent) Results from Hospital Encounter encounter on 08/27/21    CT ABD PELV W CONT    Narrative  EXAM: CT Abdomen and Pelvis with IV contrast    CLINICAL INDICATION:  fever ruq tenderness    TECHNIQUE: CT of the abdomen and pelvis performed post IV contrast. Sagittal and  coronal reformations obtained. All CT scans at this facility are performed using dose optimization technique as  appropriate to a performed exam, to include automated exposure control,  adjustment of the mA and/or kV according to patient size (including appropriate  matching for site specific examination) or use of iterative reconstruction  technique. IV CONTRAST: 60 cc of Isovue 300    ENTERIC CONTRAST: None    COMPARISON: None    FINDINGS:  Lower Chest:Mild bibasilar atelectasis is noted. No effusion or consolidation  identified. The visualized heart and pericardium are unremarkable. Peritoneum: No free air appreciated. No free fluid identified. No fluid  collections seen. Liver: No focal lesion appreciated. Biliary/Gallbladder: No intrahepatic or extrahepatic biliary ductal dilatation  appreciated. The gallbladder is distended. No distinct inflammatory changes  appreciated. Spleen: Unremarkable. Pancreas: No focal lesion appreciated. The pancreatic duct is unremarkable. No  peripancreatic inflammation or adenopathy. Adrenals: The adrenal glands are unremarkable. Right Kidney:  No perinephric fat stranding appreciated. A 1.3 cm cyst is  noted in the superior pole of the right kidney.  There is mild prominence of the  right collecting system without evidence of obstructing process. Left Kidney: No perinephric fat stranding appreciated. In the interpolar  region of the left kidney, there is a 1.5 cm cyst. There is mild prominence of  the left collecting system without evidence of discrete obstructing process.  Pelvic organs: The bladder is distended. No wall thickening identified. A  potential small diverticulum along the left bladder wall is noted. Prostatic  hypertrophy is noted. The prostate measures 7.6 x 6.7 x 7.7 cm. GI: The stomach is unremarkable. The small bowel is without evidence of  obstruction. No small bowel wall thickening. The mesentery is without  inflammation or adenopathy. The appendix is unremarkable. Diverticulosis of the  descending and sigmoid colon are noted. In a short segment of the sigmoid colon  best seen on axial image 66 of series 2, there is apparent mild narrowing and  thickening of the wall. This is nonspecific but could represent an underlying  lesion. Further evaluation with colonoscopy is recommended. Vasculature: No aortic aneurysm seen. The IVC is unremarkable. Retroperitoneum:  No periaortic adenopathy seen. No fluid collections in the  retroperitoneum appreciated. Abdominal wall: Unremarkable    Musculoskeletal: Multilevel degenerative disc disease and facet arthropathy. Bilateral hip osteoarthritis is noted. Impression  1. Distended gallbladder. No clear inflammatory change. However, if continued  clinical concern, consider nuclear medicine hepatobiliary scan or ultrasound for  further evaluation. 2.  Short segment narrowing of the colon with thick wall. This is nonspecific  but because represent a colonic lesion. Further evaluation with colonoscopy is  recommended. 3.  Prostatic hypertrophy. 4.  Mild prominence of the collecting system bilaterally which may represent  some element of bladder outlet issues. No current facility-administered medications for this encounter.      Current Outpatient Medications   Medication Sig    metroNIDAZOLE (FlagyL) 500 mg tablet Take 1 Tablet by mouth three (3) times daily for 10 days. Indications: diarrhea from an infection with Clostridium difficile bacteria    finasteride (PROSCAR) 5 mg tablet Take 1 Tablet by mouth daily.  tamsulosin (FLOMAX) 0.4 mg capsule Take 1 Capsule by mouth daily.  risperiDONE (RisperDAL) 0.5 mg tablet take 1 tablet by mouth every evening    propranoloL (INDERAL) 10 mg tablet take 1 tablet by mouth twice a day    pantoprazole (PROTONIX) 40 mg tablet take 1 tablet by mouth once daily (Patient taking differently: Take 40 mg by mouth ACB/HS. take 1 tablet by mouth once daily)       PROCEDURE: PRESLEY CATHETER PLACEMENT  Consent obtained from patient for procedure. Patient was identified. Genital area was prepped and draped in normal sterile fashion. Genital evaluation revealed: Blood-tinged at the meatus and in the diaper. No active blood noted oozing. Presley: 16fr coude placed without any difficulties. Balloon filled with 10cc sterile water. Presley secured to patient's leg and attached to a drainage bag.   Drained: > 200 cc clear urine      Plan: See above      Wendy Patel MD    Office: [phone]  Pager: [phone]

## 2021-09-11 NOTE — ED NOTES
Pt d/cd to home awake, alert and in NAD assisted by his personal care giver. All questions answered.

## 2021-09-11 NOTE — PROGRESS NOTES
09/15/21          ICD-10-CM ICD-9-CM    1. Medicare annual wellness visit, subsequent  Z00.00 V70.0 DEPRESSION SCREEN ANNUAL      varicella-zoster recombinant, PF, (Shingrix, PF,) 50 mcg/0.5 mL susr injection      diph,pertuss,acel,,tetanus vac,PF, (ADACEL) 2 Lf-(2.5-5-3-5 mcg)-5Lf/0.5 mL syrg vaccine   2. Benign essential hypertension  I10 401.1    3. Screening for depression  Z13.31 V79.0 DEPRESSION SCREEN ANNUAL   4. Anemia, unspecified type  D64.9 285.9 FE+CBC/D/PLT+TIBC+KIM+RETIC      TRANSFERRIN      METHYLMALONIC ACID      RBC, FOLATE      CBC WITH AUTOMATED DIFF   5. Stage 3b chronic kidney disease (HCC)  D17.25 207.8 METABOLIC PANEL, COMPREHENSIVE   6. Peripheral edema  R60.9 782.3    7. Memory loss  R41.3 780.93    8. Urinary retention  R33.9 788.20      Follow-up and Dispositions    · Return in about 1 year (around 9/15/2022), or if symptoms worsen or fail to improve. Assessment and Plan  The patient underwent a Medicare wellness visit today. We reviewed his ACP wishes. He has cognitive decline is and is unable to give his wishes. We had previously talked to his power of  and no changes were voiced at that time. He is taken care of by a caretaker at home. He is not a behavior risk problem. The patient has an anemia. It is microcytic normochromic. We will recheck the iron studies. We will also check B12 deficiency. History of hypertension good control. The patient is s/p urinary retention problems. He is due to follow-up with urology. There is a history of stage IIIb chronic kidney disease and will follow the labs. No treatment. The peripheral edema has improved. The patient does have dementia. He is well cared for. Lab results and schedule of future lab studies reviewed with patient  Diagnostic and radiologic results and the schedule of future studies were reviewed with the patient  All questions were answered and understood.       Health Maintenance Due   Topic Date Due  DTaP/Tdap/Td series (1 - Tdap) Never done    Shingrix Vaccine Age 50> (1 of 2) Never done    Medicare Yearly Exam  05/14/2021    Flu Vaccine (1) 09/01/2021       Subjective:   Silviano Ruiz is a 80 y.o. male has Hypertrophy of prostate with urinary obstruction and other lower urinary tract symptoms (LUTS), Elevated prostate specific antigen (PSA), Tremor, Dizziness and giddiness, Benign essential hypertension, Anxiety states, Osteoarthrosis involving multiple sites, Depressive disorder, Memory loss, Urinary retention, UTI (urinary tract infection), Sepsis ([facility] [address] Leukocytosis, and Fever on their problem list.. No chief complaint on file. The patient was last seen on 6/7/2021. On the left visit he was noted to have newly noticed peripheral edema. Lab were ordered. The patient has essential hypertension. The patient continues to be followed by home health. Seen in the ED 9/10/2021 and discharged with a diagnosis of diarrhea unspecified type and urinary retention. Bladder scan had revealed greater than 400 cc of urine in the bladder. A Du catheter was placed by urology. The patient was admitted 8/27/2021 to 8/30/2021. He was discharged with a diagnosis of sepsis due to UTIs and cystitis. He had acute urinary retention with BPH. S/p Du insertion. The patient also had a distended gallbladder but no right upper quadrant pain or tenderness. The patient signed a DNR/DNI form in 2014 and continues to be so. At discharge his white count was 11,300. His hemoglobin was 8.5. Platelet count was 578,256. Sodium 137 potassium 3.5 chloride 106 CO2 25 anion gap 6. Glucose 96. Creatinine 1.75 with BUN 39. GFR estimated to be 44 but again this is just post obstructive bladder. The ALT was 76 and AST was 73. A stool for C. difficile was indeterminate molecular testing returned positive in confirmation. A previous CAT scan August 27, 2021 showed a distended gallbladder mentioned before.  There was a short segment narrowing of the colon with a thick wall this was nonspecific but could represent a colonic lesion. Colonoscopy was recommended but thought not indicated given his age. [place] Red, home care nurse  Translating. Patient has poor hearing/understanding  Returned to ED so dove placed again  September 29th with [facility] at 01:40    Anemia  The patient has a microcytic normochromic anemia. The patient has no reported history of bleeding or melena, bruising, or petechiae. The patient had no history of PICA. There is no reported history of paresthesias, balance problems or changes in mentation. No recent transfusions. Lab Results   Component Value Date/Time    WBC 11.3 09/10/2021 09:35 PM    HGB 8.5 (L) 09/10/2021 09:35 PM    HCT 26.5 (L) 09/10/2021 09:35 PM    PLATELET 540 27/98/8797 09:35 PM    MCV 90.1 09/10/2021 09:35 PM     Lab Results   Component Value Date/Time    Iron 37 (L) 09/29/2014 05:53 PM    TIBC 241 (L) 09/29/2014 05:53 PM    Iron % saturation 15 09/29/2014 05:53 PM                         Hypertension  The patient has had no problem with the medication. The patient has no headaches, visual changes, chest pain or pressure,dyspnea, orthopnea, abdominal pain, dysuria, weakness, or paresthesias.   BP Readings from Last 3 Encounters:   09/13/21 110/60   09/10/21 (!) 123/59   09/10/21 100/62     Lab Results   Component Value Date/Time    Sodium 137 09/10/2021 08:45 PM    Potassium 3.5 09/10/2021 08:45 PM    Chloride 106 09/10/2021 08:45 PM    CO2 25 09/10/2021 08:45 PM    Anion gap 6 09/10/2021 08:45 PM    Glucose 96 09/10/2021 08:45 PM    BUN 39 (H) 09/10/2021 08:45 PM    Creatinine 1.75 (H) 09/10/2021 08:45 PM    BUN/Creatinine ratio 22 (H) 09/10/2021 08:45 PM    GFR est AA 44 (L) 09/10/2021 08:45 PM    GFR est non-AA 36 (L) 09/10/2021 08:45 PM    Calcium 7.8 (L) 09/10/2021 08:45 PM     No results found for: EKG, GFR  No data recorded    Key CAD CHF Meds             propranoloL (INDERAL) 10 mg tablet take 1 tablet by mouth twice a day          Urinary retention  The patient was seen in the ED for urinary retention. A Du had been placed. He is going to follow-up with his urologist.  At this time is no fevers chills or sweats. No pain is admitted to. Current Outpatient Medications   Medication Sig    metroNIDAZOLE (FlagyL) 500 mg tablet Take 1 Tablet by mouth three (3) times daily for 10 days. Indications: diarrhea from an infection with Clostridium difficile bacteria    finasteride (PROSCAR) 5 mg tablet Take 1 Tablet by mouth daily.  tamsulosin (FLOMAX) 0.4 mg capsule Take 1 Capsule by mouth daily.  risperiDONE (RisperDAL) 0.5 mg tablet take 1 tablet by mouth every evening    propranoloL (INDERAL) 10 mg tablet take 1 tablet by mouth twice a day    pantoprazole (PROTONIX) 40 mg tablet take 1 tablet by mouth once daily (Patient taking differently: Take 40 mg by mouth ACB/HS. take 1 tablet by mouth once daily)     No current facility-administered medications for this visit. Allergies   Allergen Reactions    Sulfa (Sulfonamide Antibiotics) Other (comments)     Dizziness/ passed out     has Hypertrophy of prostate with urinary obstruction and other lower urinary tract symptoms (LUTS), Elevated prostate specific antigen (PSA), Tremor, Dizziness and giddiness, Benign essential hypertension, Anxiety states, Osteoarthrosis involving multiple sites, Depressive disorder, Memory loss, Urinary retention, UTI (urinary tract infection), Sepsis ([address] Leukocytosis, and Fever on their problem list.    Past Surgical History:   Procedure Laterality Date    HX APPENDECTOMY      HX HERNIA REPAIR      HX OTHER SURGICAL      rectal fissure surgery    HX OTHER SURGICAL Bilateral     eye surgery      reports that he has quit smoking.  He has never used smokeless tobacco. He reports current alcohol use.  family history includes Hypertension in his maternal grandfather; No Known Problems in his father and mother; Stroke in his maternal grandfather. Review of Systems   Constitutional: Negative for chills and fever. HENT: Negative for congestion. Respiratory: Negative for shortness of breath. Cardiovascular: Positive for leg swelling. Negative for chest pain, orthopnea and PND. Gastrointestinal: Negative for heartburn. Genitourinary: Negative for dysuria. Musculoskeletal: Negative for myalgias. Physical Exam  Constitutional:       General: He is not in acute distress. Appearance: Normal appearance. HENT:      Right Ear: External ear normal.      Left Ear: External ear normal.      Nose: Nose normal.      Mouth/Throat:      Mouth: Mucous membranes are moist.      Pharynx: Oropharynx is clear. Eyes:      General: No scleral icterus. Extraocular Movements: Extraocular movements intact. Pupils: Pupils are equal, round, and reactive to light. Comments: Eyes look watery   Pulmonary:      Effort: Pulmonary effort is normal.   Abdominal:      General: There is no distension. Musculoskeletal:         General: No swelling. Right lower leg: No edema. Left lower leg: No edema. Skin:     Coloration: Skin is not jaundiced. Findings: No erythema. Neurological:      Mental Status: He is alert and oriented to person, place, and time. Coordination: Coordination normal.      Gait: Gait normal.   Psychiatric:         Mood and Affect: Mood normal.         Behavior: Behavior normal.            Results for orders placed or performed during the hospital encounter of 09/10/21   C. DIFFICILE AG & TOXIN A/B   Result Value Ref Range    PCR Reflex See Reflex order for C. difficile (DNA)      INTERPRETATION (A) NTXCD       Indeterminate for Toxigenic C. difficile, DNA confirmation to follow.    C DIFF MOLECULAR    Specimen: Miscellaneous sample    Stool specimen   Result Value Ref Range    C Diff Molecular Positive (A) NEG     METABOLIC PANEL, COMPREHENSIVE   Result Value Ref Range    Sodium 137 136 - 145 mmol/L    Potassium 3.5 3.5 - 5.5 mmol/L    Chloride 106 100 - 111 mmol/L    CO2 25 21 - 32 mmol/L    Anion gap 6 3.0 - 18 mmol/L    Glucose 96 74 - 99 mg/dL    BUN 39 (H) 7.0 - 18 MG/DL    Creatinine 1.75 (H) 0.6 - 1.3 MG/DL    BUN/Creatinine ratio 22 (H) 12 - 20      GFR est AA 44 (L) >60 ml/min/1.73m2    GFR est non-AA 36 (L) >60 ml/min/1.73m2    Calcium 7.8 (L) 8.5 - 10.1 MG/DL    Bilirubin, total 0.3 0.2 - 1.0 MG/DL    ALT (SGPT) 76 (H) 16 - 61 U/L    AST (SGOT) 73 (H) 10 - 38 U/L    Alk. phosphatase 100 45 - 117 U/L    Protein, total 7.3 6.4 - 8.2 g/dL    Albumin 2.0 (L) 3.4 - 5.0 g/dL    Globulin 5.3 (H) 2.0 - 4.0 g/dL    A-G Ratio 0.4 (L) 0.8 - 1.7     MAGNESIUM   Result Value Ref Range    Magnesium 2.2 1.6 - 2.6 mg/dL   CBC WITH AUTOMATED DIFF   Result Value Ref Range    WBC 11.3 4.6 - 13.2 K/uL    RBC 2.94 (L) 4.35 - 5.65 M/uL    HGB 8.5 (L) 13.0 - 16.0 g/dL    HCT 26.5 (L) 36.0 - 48.0 %    MCV 90.1 78.0 - 100.0 FL    MCH 28.9 24.0 - 34.0 PG    MCHC 32.1 31.0 - 37.0 g/dL    RDW 13.5 11.6 - 14.5 %    PLATELET 534 125 - 404 K/uL    MPV 10.0 9.2 - 11.8 FL    NEUTROPHILS 76 (H) 40 - 73 %    LYMPHOCYTES 12 (L) 21 - 52 %    MONOCYTES 7 3 - 10 %    EOSINOPHILS 3 0 - 5 %    BASOPHILS 0 0 - 2 %    ABS. NEUTROPHILS 8.6 (H) 1.8 - 8.0 K/UL    ABS. LYMPHOCYTES 1.4 0.9 - 3.6 K/UL    ABS. MONOCYTES 0.8 0.05 - 1.2 K/UL    ABS. EOSINOPHILS 0.4 0.0 - 0.4 K/UL    ABS.  BASOPHILS 0.0 0.0 - 0.1 K/UL    DF AUTOMATED     EKG, 12 LEAD, INITIAL   Result Value Ref Range    Ventricular Rate 69 BPM    Atrial Rate 69 BPM    P-R Interval 208 ms    QRS Duration 66 ms    Q-T Interval 430 ms    QTC Calculation (Bezet) 460 ms    Calculated P Axis 44 degrees    Calculated R Axis -33 degrees    Calculated T Axis 11 degrees    Diagnosis       Normal sinus rhythm  Left axis deviation  Inferior infarct , age undetermined  Abnormal ECG  When compared with ECG of 27-AUG-2021 03:48,  QRS duration has decreased  Borderline criteria for Lateral infarct are no longer present  Inferior infarct is now present  ST elevation now present in Lateral leads       We discussed the expected course, resolution and complications of the diagnosis(es) in detail. Medication risks, benefits, costs, interactions, and alternatives were discussed as indicated. I advised him to contact the office if his condition worsens, changes or fails to improve as anticipated. He expressed understanding with the diagnosis(es) and plan. This note was done with the assistance of dragon speech software.   Some inadvertent errors or omissions may be present

## 2021-09-12 NOTE — HOME HEALTH
SUBJECTIVE: PCA notes he will be having his catheter removed today. He did eat a good breakfast this morning. He still has 3-4 bowel movements a day. CAREGIVER INVOLVEMENT/ASSISTANCE NEEDED FOR: Patient has PCA 24/7 to assist with ADL's and transfers. HOME HEALTH SUPPLIES BY TYPE AND QUANTITY ORDERED/DELIVERED THIS VISIT INCLUDE: Fax request sent to PCP in regards for tim lift and hospital bed. OBJECTIVE: See interventions. ASSESSMENT OF PROGRESS TOWARD GOALS: Patient ambulated throughout home ~10 feet with ~1 minute rest break post ambulation due to fatigue. Patient required verbal/tactile cues for proper technique. Patient with frequent rest breaks throughout activities due to fatigue. Patient able to stand upright ~5 minutes until fatigue with use of walker and mod A.    CONTINUED NEED FOR THE FOLLOWING SKILLS: Patient would benefit from continuation of PT to improve balance in sitting and standing to decrease fall risk. Patient needs constant reinforcement on proper technique with transfer training and stair navigation. PLAN FOR NEXT VISIT: Increase ambulation distance and bed mobility next visit. THE FOLLOWING DISCHARGE PLANNING WAS DISCUSSED WITH THE PATIENT/CAREGIVER: D/C from HHPT in 2w3 when goals are met or max potential benefit achieved.

## 2021-09-15 NOTE — PROGRESS NOTES
This is the Subsequent Medicare Annual Wellness Exam, performed 12 months or more after the Initial AWV or the last Subsequent AWV    I have reviewed the patient's medical history in detail and updated the computerized patient record. Assessment/Plan   Education and counseling provided:  Are appropriate based on today's review and evaluation    1. Medicare annual wellness visit, subsequent  -     DEPRESSION SCREEN ANNUAL  -     varicella-zoster recombinant, PF, (Shingrix, PF,) 50 mcg/0.5 mL susr injection; 0.5mL by IntraMUSCular route once now and then repeat in 2-6 months, Print, Disp-0.5 mL, R-1  -     diph,pertuss,acel,,tetanus vac,PF, (ADACEL) 2 Lf-(2.5-5-3-5 mcg)-5Lf/0.5 mL syrg vaccine; 0.5 mL by IntraMUSCular route once for 1 dose., Print, Disp-0.5 mL, R-0  2. Screening for depression  -     DEPRESSION SCREEN ANNUAL    Depression Risk Factor Screening:     3 most recent PHQ Screens 5/13/2020   Little interest or pleasure in doing things Not at all   Feeling down, depressed, irritable, or hopeless Not at all   Total Score PHQ 2 0       Alcohol Risk Screen    Do you average more than 1 drink per night or more than 7 drinks a week: No    In the past three months have you have had more than 4 drinks containing alcohol on one occasion: No        Functional Ability and Level of Safety:    Hearing: Hearing is good. Poor hearing declines follow up for evaluation      Activities of Daily Living: The home contains: The patient has a home health assistant who helps him with arising and going from room to room. They have a 4-prong walker. He does not have rails. Patient needs help with:  transportation, shopping, preparing meals, laundry, housework, managing medications, managing money, dressing, bathroom needs and walking      Ambulation: with mild difficulty     Fall Risk:  Fall Risk Assessment, last 12 mths 5/13/2020   Able to walk? Yes   Fall in past 12 months? No   No falls  Gets help getting up.    [facility] Home aid MWF and every other weekend  8 hours a day. Abuse Screen:  Patient is not abused       Cognitive Screening    Has your family/caregiver stated any concerns about your memory: no    Cognitive Screening: The patient is not oriented to place or person or time. He does not remember that his birthday is 1 day away. He does know who he is and is cooperative. Does not know day or who the president is.   Does not know is birthday tomorrow  He had the COVID vaccine    Health Maintenance Due     Health Maintenance Due   Topic Date Due    DTaP/Tdap/Td series (1 - Tdap) Never done    Shingrix Vaccine Age 49> (1 of 2) Never done    Medicare Yearly Exam  05/14/2021    Flu Vaccine (1) 09/01/2021       Patient Care Team   Patient Care Team:  Venkatesh Cruz MD as PCP - General (Internal Medicine)  Venkatesh Cruz MD as PCP - [address] [place] [address] Dr Stevenson Provider    History     Patient Active Problem List   Diagnosis Code    Hypertrophy of prostate with urinary obstruction and other lower urinary tract symptoms (LUTS) N40.1, N13.8    Elevated prostate specific antigen (PSA) R97.20    Tremor R25.1    Dizziness and giddiness R42    Benign essential hypertension I10    Anxiety states F41.1    Osteoarthrosis involving multiple sites M15.9    Depressive disorder F32.9    Memory loss R41.3    Urinary retention R33.9    UTI (urinary tract infection) N39.0    Sepsis ([address] A41.9    Leukocytosis D72.829    Fever R50.9     Past Medical History:   Diagnosis Date    Allergy     Anxiety states     Arthritis     Benign essential hypertension     Depressive disorder     Diverticulitis     Dizziness and giddiness     Elevated prostate specific antigen (PSA)     Glaucoma     Gross hematuria     Hypertrophy of prostate with urinary obstruction and other lower urinary tract symptoms (LUTS)     Nocturia     Osteoarthrosis involving multiple sites     Tremor       Past Surgical History:   Procedure Laterality Date  HX APPENDECTOMY      HX HERNIA REPAIR      HX OTHER SURGICAL      rectal fissure surgery    HX OTHER SURGICAL Bilateral     eye surgery     Current Outpatient Medications   Medication Sig Dispense Refill    metroNIDAZOLE (FlagyL) 500 mg tablet Take 1 Tablet by mouth three (3) times daily for 10 days. Indications: diarrhea from an infection with Clostridium difficile bacteria 30 Tablet 0    cefpodoxime (VANTIN) 100 mg tablet Take 100 mg by mouth two (2) times a day. for 5 days      finasteride (PROSCAR) 5 mg tablet Take 1 Tablet by mouth daily. 30 Tablet 0    tamsulosin (FLOMAX) 0.4 mg capsule Take 1 Capsule by mouth daily. 30 Capsule 0    risperiDONE (RisperDAL) 0.5 mg tablet take 1 tablet by mouth every evening 120 Tablet 3    propranoloL (INDERAL) 10 mg tablet take 1 tablet by mouth twice a day 180 Tab 3    pantoprazole (PROTONIX) 40 mg tablet take 1 tablet by mouth once daily (Patient taking differently: Take 40 mg by mouth ACB/HS. take 1 tablet by mouth once daily) 120 Tab 3     Allergies   Allergen Reactions    Sulfa (Sulfonamide Antibiotics) Other (comments)     Dizziness/ passed out       Family History   Problem Relation Age of Onset    No Known Problems Mother     No Known Problems Father     Hypertension Maternal Grandfather     Stroke Maternal Grandfather      Social History     Tobacco Use    Smoking status: Former Smoker    Smokeless tobacco: Never Used   Substance Use Topics    Alcohol use: Yes     Comment: albertina Ribeiro, who was evaluated through a synchronous (real-time) audio-video encounter, and/or his healthcare decision maker, is aware that it is a billable service, with coverage as determined by his insurance carrier. He provided verbal consent to proceed: Yes, and patient identification was verified.  It was conducted pursuant to the emergency declaration under the [facility] waiver authority and the Coronavirus Preparedness and Response Supplemental Appropriations Act. A caregiver was present when appropriate. Ability to conduct physical exam was limited. I was in the office. The patient was at home.     Ambar Ross MD

## 2021-09-15 NOTE — PATIENT INSTRUCTIONS
Medicare Wellness Visit, Male    The best way to live healthy is to have a lifestyle where you eat a well-balanced diet, exercise regularly, limit alcohol use, and quit all forms of tobacco/nicotine, if applicable. Regular preventive services are another way to keep healthy. Preventive services (vaccines, screening tests, monitoring & exams) can help personalize your care plan, which helps you manage your own care. Screening tests can find health problems at the earliest stages, when they are easiest to treat. [facility] follows the current, evidence-based guidelines published by the [facility] ([facility]) when recommending preventive services for our patients. Because we follow these guidelines, sometimes recommendations change over time as research supports it. (For example, a prostate screening blood test is no longer routinely recommended for men with no symptoms). Of course, you and your doctor may decide to screen more often for some diseases, based on your risk and co-morbidities (chronic disease you are already diagnosed with). Preventive services for you include:  - Medicare offers their members a free annual wellness visit, which is time for you and your primary care provider to discuss and plan for your preventive service needs. Take advantage of this benefit every year!  -All adults over age 72 should receive the recommended pneumonia vaccines. Current USPSTF guidelines recommend a series of two vaccines for the best pneumonia protection.   -All adults should have a flu vaccine yearly and tetanus vaccine every 10 years.  -All adults age 48 and older should receive the shingles vaccines (series of two vaccines).        -All adults age 38-68 who are overweight should have a diabetes screening test once every three years.   -Other screening tests & preventive services for persons with diabetes include: an eye exam to screen for diabetic retinopathy, a kidney function test, a foot exam, and stricter control over your cholesterol.   -Cardiovascular screening for adults with routine risk involves an electrocardiogram (ECG) at intervals determined by the provider.   -Colorectal cancer screening should be done for adults age 54-65 with no increased risk factors for colorectal cancer. There are a number of acceptable methods of screening for this type of cancer. Each test has its own benefits and drawbacks. Discuss with your provider what is most appropriate for you during your annual wellness visit. The different tests include: colonoscopy (considered the best screening method), a fecal occult blood test, a fecal DNA test, and sigmoidoscopy.  -All adults born between [facility] should be screened once for Hepatitis C.  -An Abdominal Aortic Aneurysm (AAA) Screening is recommended for men age 73-68 who has ever smoked in their lifetime. Here is a list of your current Health Maintenance items (your personalized list of preventive services) with a due date:  Health Maintenance Due   Topic Date Due    DTaP/Tdap/Td  (1 - Tdap) Never done    Shingles Vaccine (1 of 2) Never done    Annual Well Visit  05/14/2021    Yearly Flu Vaccine (1) 09/01/2021     Vaccine Information Statement    Tdap (Tetanus, Diphtheria, Pertussis) Vaccine: What you need to know     Many Vaccine Information Statements are available in [place] and other languages. See www.immunize.org/vis  Hojas de información sobre vacunas están disponibles en español y en muchos otros idiomas. Visite www.immunize.org/vis    1. Why get vaccinated? Tdap vaccine can prevent tetanus, diphtheria, and pertussis. Diphtheria and pertussis spread from person to person. Tetanus enters the body through cuts or wounds.  TETANUS (T) causes painful stiffening of the muscles.  Tetanus can lead to serious health problems, including being unable to open the mouth, having trouble swallowing and breathing, or death.  DIPHTHERIA (D) can lead to difficulty breathing, heart failure, paralysis, or death.  PERTUSSIS (aP), also known as whooping cough, can cause uncontrollable, violent coughing which makes it hard to breathe, eat, or drink. Pertussis can be extremely serious in babies and young children, causing pneumonia, convulsions, brain damage, or death. In teens and adults, it can cause weight loss, loss of bladder control, passing out, and rib fractures from severe coughing. 2. Tdap vaccine     Tdap is only for children 7 years and older, adolescents, and adults. Adolescents should receive a single dose of Tdap, preferably at age 6 or 15 years. Pregnant women should get a dose of Tdap during every pregnancy, to protect the  from pertussis. Infants are most at risk for severe, life-threatening complications from pertussis. Adults who have never received Tdap should get a dose of Tdap. Also, adults should receive a booster dose every 10 years, or earlier in the case of a severe and dirty wound or burn. Booster doses can be either Tdap or Td (a different vaccine that protects against tetanus and diphtheria but not pertussis). Tdap may be given at the same time as other vaccines. 3. Talk with your health care provider    Tell your vaccine provider if the person getting the vaccine:   Has had an allergic reaction after a previous dose of any vaccine that protects against tetanus, diphtheria, or pertussis, or has any severe, life-threatening allergies.  Has had a coma, decreased level of consciousness, or prolonged seizures within 7 days after a previous dose of any pertussis vaccine (DTP, DTaP, or Tdap).  Has seizures or another nervous system problem.  Has ever had Guillain-Barré Syndrome (also called GBS).  Has had severe pain or swelling after a previous dose of any vaccine that protects against tetanus or diphtheria.      In some cases, your health care provider may decide to postpone Tdap vaccination to a future visit. People with minor illnesses, such as a cold, may be vaccinated. People who are moderately or severely ill should usually wait until they recover before getting Tdap vaccine. Your health care provider can give you more information. 4. Risks of a vaccine reaction     Pain, redness, or swelling where the shot was given, mild fever, headache, feeling tired, and nausea, vomiting, diarrhea, or stomachache sometimes happen after Tdap vaccine. People sometimes faint after medical procedures, including vaccination. Tell your provider if you feel dizzy or have vision changes or ringing in the ears. As with any medicine, there is a very remote chance of a vaccine causing a severe allergic reaction, other serious injury, or death. 5. What if there is a serious problem? An allergic reaction could occur after the vaccinated person leaves the clinic. If you see signs of a severe allergic reaction (hives, swelling of the face and throat, difficulty breathing, a fast heartbeat, dizziness, or weakness), call 9-1-1 and get the person to the nearest hospital.    For other signs that concern you, call your health care provider. Adverse reactions should be reported to the Vaccine Adverse Event Reporting System (VAERS). Your health care provider will usually file this report, or you can do it yourself. Visit the VAERS website at www.vaers. hhs.gov or call 4-729.792.9800. VAERS is only for reporting reactions, and VAERS staff do not give medical advice. 6. The National Vaccine Injury Compensation Program    The [facility] Vaccine Injury Compensation Program (VICP) is a federal program that was created to compensate people who may have been injured by certain vaccines. Visit the VICP website at www.hrsa.gov/vaccinecompensation or call 9-554.833.4123 to learn about the program and about filing a claim.  There is a time limit to file a claim for compensation. 7. How can I learn more?  Ask your health care provider.  Call your local or state health department.  Contact the Centers for Disease Control and Prevention (CDC):  - Call 8-991.234.8918 (1-800-CDC-INFO) or  - Visit CDCs website at www.cdc.gov/vaccines    Vaccine Information Statement (Interim)  Tdap (Tetanus, Diphtheria, Pertussis) Vaccine  04/01/2020  [address] [id number]   Department of Health and Human Services  Centers for Disease Control and Prevention    Office Use Only      Vaccine Information Statement    Recombinant Zoster (Shingles) Vaccine: What You Need to Know    Many Vaccine Information Statements are available in [place] and other languages. See www.immunize.org/vis  Hojas de información sobre vacunas están disponibles en español y en muchos otros idiomas. Visite www.immunize.org/vis    1. Why get vaccinated? Recombinant zoster (shingles) vaccine can prevent shingles. Shingles (also called herpes zoster, or just zoster) is a painful skin rash, usually with blisters. In addition to the rash, shingles can cause fever, headache, chills, or upset stomach. More rarely, shingles can lead to pneumonia, hearing problems, blindness, brain inflammation (encephalitis), or death. The most common complication of shingles is long-term nerve pain called postherpetic neuralgia (PHN). PHN occurs in the areas where the shingles rash was, even after the rash clears up. It can last for months or years after the rash goes away. The pain from PHN can be severe and debilitating. About 10 to 18% of people who get shingles will experience PHN. The risk of PHN increases with age. An older adult with shingles is more likely to develop PHN and have longer lasting and more severe pain than a younger person with shingles. Shingles is caused by the varicella zoster virus, the same virus that causes chickenpox.  After you have chickenpox, the virus stays in your body and can cause shingles later in life. Shingles cannot be passed from one person to another, but the virus that causes shingles can spread and cause chickenpox in someone who had never had chickenpox or received chickenpox vaccine. 2. Recombinant shingles vaccine    Recombinant shingles vaccine provides strong protection against shingles. By preventing shingles, recombinant shingles vaccine also protects against PHN. Recombinant shingles vaccine is the preferred vaccine for the prevention of shingles. However, a different vaccine, live shingles vaccine, may be used in some circumstances. The recombinant shingles vaccine is recommended for adults 50 years and older without serious immune problems. It is given as a two-dose series. This vaccine is also recommended for people who have already gotten another type of shingles vaccine, the live shingles vaccine. There is no live virus in this vaccine. Shingles vaccine may be given at the same time as other vaccines. 3. Talk with your health care provider    Tell your vaccine provider if the person getting the vaccine:   Has had an allergic reaction after a previous dose of recombinant shingles vaccine, or has any severe, life-threatening allergies.  Is pregnant or breastfeeding.  Is currently experiencing an episode of shingles. In some cases, your health care provider may decide to postpone shingles vaccination to a future visit. People with minor illnesses, such as a cold, may be vaccinated. People who are moderately or severely ill should usually wait until they recover before getting recombinant shingles vaccine. Your health care provider can give you more information. 4. Risks of a vaccine reaction     A sore arm with mild or moderate pain is very common after recombinant shingles vaccine, affecting about 80% of vaccinated people. Redness and swelling can also happen at the site of the injection.    Tiredness, muscle pain, headache, shivering, fever, stomach pain, and nausea happen after vaccination in more than half of people who receive recombinant shingles vaccine. In clinical trials, about 1 out of 6 people who got recombinant zoster vaccine experienced side effects that prevented them from doing regular activities. Symptoms usually went away on their own in 2 to 3 days. You should still get the second dose of recombinant zoster vaccine even if you had one of these reactions after the first dose. People sometimes faint after medical procedures, including vaccination. Tell your provider if you feel dizzy or have vision changes or ringing in the ears. As with any medicine, there is a very remote chance of a vaccine causing a severe allergic reaction, other serious injury, or death. 5. What if there is a serious problem? An allergic reaction could occur after the vaccinated person leaves the clinic. If you see signs of a severe allergic reaction (hives, swelling of the face and throat, difficulty breathing, a fast heartbeat, dizziness, or weakness), call 9-1-1 and get the person to the nearest hospital.    For other signs that concern you, call your health care provider. Adverse reactions should be reported to the Vaccine Adverse Event Reporting System (VAERS). Your health care provider will usually file this report, or you can do it yourself. Visit the VAERS website at www.vaers. hhs.gov or call 3-820.667.7551. VAERS is only for reporting reactions, and VAERS staff do not give medical advice. 6. How can I learn more?  Ask your health care provider.  Call your local or state health department.    Contact the Centers for Disease Control and Prevention (CDC):  - Call 3-874.353.8507 (1-800-CDC-INFO) or  - Visit CDCs website at www.cdc.gov/vaccines    Vaccine Information Statement   Recombinant Zoster Vaccine   10/30/2019    [facility] for Disease Control and Prevention    Office Use Only

## 2021-09-15 NOTE — HOME HEALTH
SUBJECTIVE: PCA notes patient has more of an appetite lately. He went to ER over the weekend due to not being able to urinate after the catheter was taken out. He had new catheter placed and was D/C home. CAREGIVER INVOLVEMENT/ASSISTANCE NEEDED FOR: PCA 24/7 to assist with ADL's and transfers. HOME HEALTH SUPPLIES BY TYPE AND QUANTITY ORDERED/DELIVERED THIS VISIT INCLUDE: na    OBJECTIVE: See interventions. ASSESSMENT OF PROGRESS TOWARD GOALS: Patient is progressing towards goals. Patient ambulated ~35 feet with rollator walker and min A due to weakness and instability with gait. Patient transferred sit<->stand with min A. Patient required verbal and tactile cues for proper technique with transfers and ambulation. Patient with difficulty retaining information due to dementia and constant cues to stay on task. CONTINUED NEED FOR THE FOLLOWING SKILLS: Continuation of PT to further improve patients standing balance with activities to decrease fall risk. Patient needs reinforcement on proper technique with transfers and ambulation. Patient needs further instruction on stair training. PLAN FOR NEXT VISIT: Attempt stair training next visit. THE FOLLOWING DISCHARGE PLANNING WAS DISCUSSED WITH THE PATIENT/CAREGIVER: D/C from HHPT in 2 visits due to goals met or max potential benefit achieved.

## 2021-09-16 NOTE — HOME HEALTH
TEACHING VISIT for urinary catheter -c/o of swollen genitals, low urine output/color and lethargy. patient was sent to ER due to catheter  urology was called day before by PT for removal order- and  no urine for 3 hours  urolgy was contacted -due to time soke to mgr to send patient to ER  SN reason for visit: education/teaching related to medication mgt ,disease mgt and assessment -   patient made aware to monitor for s/s of infection (not observed) [increased swelling, increased redness around site, increased pain, foul smelling drainage, fever] aware who to report to/when. Caregiver involvement: Patient's cg is available at all times for assistance with iadls, adls, meal prep, medication management, taking to md appointments. Medications reconciled . The following education was provided regarding medications, medication interactions, and look a like medications. Home health supplies by type and quantity ordered/delivered this visit include: n/a  Patient education provided this visit: assessment, teaching  Reviewed medications and care plan for changes. patient/cg to continue to take medications as prescribed. patient aware to monitor for effectiveness and to notify staff of any adverse reactions to medications/any changes to medication regimen. reviewed side effects, purposes, dosage, frequencies  patient encouraged to monitor for increase in pain and to continue with current pain management and to notify staff/md if pain becomes excrutiating/intolerable. Patient is aware of fall risk. Reviewed safet precautions with patient. Educated on ambulation and or bedbound safety if applicable. INSTRUCTED PATIENT AND CG THAT SHOULD ANY NEEDS OR CONCERNS ARISE TO FIRST CALL OUR OFFICE, OR THE DR'S OFFICE  OR GO TO AN URGENT CARE CENTER AND NOT TO THE ED FOR NON-LIFE THREATENING EVENTS. IF IT IS LIFE THREATENING THEN CALL 911 OR GO TO THE CLOSEST ER.   Progress toward goals- continuing to work towards goals as reviewed in careplan with patient on each visit  Home exercise program:   activity as tolerated, trying to get physical activity 4-5 x weekly. stopping activity if causing shortness of breath or chest pain, dizziness or weakness Continued need for the following skills: Nursing, The following discharge planning was discussed with the pt/caregiver: Patient will be discharged once education has completed, and patient is medically stable.

## 2021-09-17 PROBLEM — N17.9 AKI (ACUTE KIDNEY INJURY) (HCC): Status: ACTIVE | Noted: 2021-01-01

## 2021-09-17 NOTE — H&P
Date of Admission: 9/17/2021      Assessment:   Acute urinary retention: Du catheter exchanged in the emergency department. Catheter was initially placed on 910 by urology. BPH with LUTS: Urology following. Requiring Du catheter. Complicated urinary tract infection with indwelling Du catheter: Stools 100 protein, large blood, moderate leukocyte esterase, 3+ bacteria, 3+ uric acid   -Urine cultures pending  DALLIN: Creatinine upon presentation 2.29; baseline between 1.2 and 1.75  Gross hematuria: Noted in Du bag as well as on UA  Hypertension: Variable control. Initially was elevated, better controlled after outlet obstruction resolved. Plan:   CBC, BMP in a.m.  gentle IV fluid hydration  Continue with Proscar, Flomax  Follow-up urine culture from 9/17/2021  Follow-up blood cultures from 9/17/21 x 2 sets  Continue ceftriaxone for now   -Pending culture data will potentially transition to p.o. antibiotics to complete a 26-QZJ course for complicated UTI with indwelling Du    DNR. Documentation of comfort measures on last admission. Clarified with patient he does not want feeding tubes or other invasive procedures. Jose Mcclure D.O. Internal Medicine and Infectious Diseases      Subjective:    Patient is a 80 y.o.male who is being evaluated for bladder outlet obstruction    Mr. Cj Salas is a very pleasant 80year-old gentleman with a history of anxiety, hypertension, diverticulitis, osteoarthritis, and BPH who had originally had a Du catheter placed on 9/10/2021 due to BPH and outlet obstruction. He has a home health nurse follow-up to follow with him. She noted that he did not have any additional urine output in the bag overnight so 1?-Reportedly brought the patient to the emergency department for further evaluation. He was previously admitted in August of this year for similar symptoms and was treated with [facility] for urinary tract infection.   At present time he denies any fevers or chills. He has had a mild decrease in p.o. intake. No other complaints. Called and reason for      Past Medical History:   Diagnosis Date    Allergy     Anxiety states     Arthritis     Benign essential hypertension     Depressive disorder     Diverticulitis     Dizziness and giddiness     Elevated prostate specific antigen (PSA)     Glaucoma     Gross hematuria     Hypertrophy of prostate with urinary obstruction and other lower urinary tract symptoms (LUTS)     Nocturia     Osteoarthrosis involving multiple sites     Tremor      Past Surgical History:   Procedure Laterality Date    HX APPENDECTOMY      HX HERNIA REPAIR      HX OTHER SURGICAL      rectal fissure surgery    HX OTHER SURGICAL Bilateral     eye surgery     Family History   Problem Relation Age of Onset    No Known Problems Mother     No Known Problems Father     Hypertension Maternal Grandfather     Stroke Maternal Grandfather      Medications reviewed as below:   Current Facility-Administered Medications   Medication Dose Route Frequency Provider Last Rate Last Admin    tamsulosin (FLOMAX) capsule 0.4 mg  0.4 mg Oral DAILY Ailyn Mercado NP        finasteride (PROSCAR) tablet 5 mg  5 mg Oral DAILY iAlyn Mercado NP         Current Outpatient Medications   Medication Sig Dispense Refill    erythromycin (ILOTYCIN) ophthalmic ointment APPLY A THIN LAYER ON EYELID THREE TIMES A DAY      varicella-zoster recombinant, PF, (Shingrix, PF,) 50 mcg/0.5 mL susr injection 0.5mL by IntraMUSCular route once now and then repeat in 2-6 months 0.5 mL 1    metroNIDAZOLE (FlagyL) 500 mg tablet Take 1 Tablet by mouth three (3) times daily for 10 days. Indications: diarrhea from an infection with Clostridium difficile bacteria 30 Tablet 0    cefpodoxime (VANTIN) 100 mg tablet Take 100 mg by mouth two (2) times a day. for 5 days      finasteride (PROSCAR) 5 mg tablet Take 1 Tablet by mouth daily.  30 Tablet 0    tamsulosin (FLOMAX) 0.4 mg capsule Take 1 Capsule by mouth daily. 30 Capsule 0    risperiDONE (RisperDAL) 0.5 mg tablet take 1 tablet by mouth every evening 120 Tablet 3    propranoloL (INDERAL) 10 mg tablet take 1 tablet by mouth twice a day 180 Tab 3    pantoprazole (PROTONIX) 40 mg tablet take 1 tablet by mouth once daily (Patient taking differently: Take 40 mg by mouth ACB/HS. take 1 tablet by mouth once daily) 120 Tab 3     Allergies   Allergen Reactions    Sulfa (Sulfonamide Antibiotics) Other (comments)     Dizziness/ passed out     Social History     Socioeconomic History    Marital status: UNKNOWN     Spouse name: Not on file    Number of children: Not on file    Years of education: Not on file    Highest education level: Not on file   Occupational History    Not on file   Tobacco Use    Smoking status: Former Smoker    Smokeless tobacco: Never Used   Substance and Sexual Activity    Alcohol use: Yes     Comment: occasional    Drug use: Not on file    Sexual activity: Not on file   Other Topics Concern    Not on file   Social History Narrative    Not on file     Social Determinants of Health     Financial Resource Strain:     Difficulty of Paying Living Expenses:    Food Insecurity:     Worried About Running Out of Food in the Last Year:     [address] in the Last Year:    Transportation Needs:     Lack of Transportation (Medical):      Lack of Transportation (Non-Medical):    Physical Activity:     Days of Exercise per Week:     Minutes of Exercise per Session:    Stress:     Feeling of Stress :    Social Connections:     Frequency of Communication with Friends and Family:     Frequency of Social Gatherings with Friends and Family:     Attends Christian Services:     Active Member of Clubs or Organizations:     Attends Club or Organization Meetings:     Marital Status:    Intimate Partner Violence:     Fear of Current or Ex-Partner:     Emotionally Abused:     Physically Abused:     Sexually Abused: Review of Systems    Negative Unless BOLDED    General: fevers, chills, myalgias, arthralgias, unexplained weight loss, malaise, fatigue. HEENT:  headaches,sinus pain or presure, recent URI, recent dental procedures;  tinnitus, hearing loss , visual changes, catarats, dizziness or blurred vision  PUlMONARY:  cough , shortness of breath, sputum production, hx of asthma or COPD. previous treatement for TB or PPD. Cardiovascular: chest pain, previous CAD/MI, vavlular heart disease,  murmurs  GI:   nausea, vomiting, diarrhea, abdominal pain, prior C.diff  :  urinary frequency, dysuria, hematuria, bladder incontinence. Neurologic:  seizures, syncope or prior CVA/TIA, confusion, memory impairment, neuropathy  Musculoskeletal:  myalgias arthralgias, joint pain/ swelling,  back pain  Skin:  Purities,  recurrent cellulitis,  chronic stasis ulcer, diabetic foot ulcers  Endocrine: polyuria, polydipsia, hair loss, weight gain  Psych: Denies depression or treatment by a psychiatrist/psycologist  Heme-Onc: prior DVT, easy bruising, fatigue, malignancy        Objective:        Visit Vitals  BP (!) 117/57   Pulse 97   Temp 99.3 °F (37.4 °C)   Resp 14   Ht 5' 10\" (1.778 m)   Wt 63.5 kg (140 lb)   SpO2 96%   BMI 20.09 kg/m²     Temp (24hrs), Av.3 °F (37.4 °C), Min:99.3 °F (37.4 °C), Max:99.3 °F (37.4 °C)        General:   awake alert and oriented, appears stated age. Skin:   no rashes or skin lesions noted on limited exam   HEENT:  Normocephalic, atraumatic, PERRL, EOMI, no scleral icterus or pallor; no conjunctival hemmohage;  nasal and oral mucous are moist and without evidence of lesions. No thrush. Dentition good. Neck supple, no bruits.    Lymph Nodes:   no cervical, axillary or inguinal adenopathy   Lungs:   non-labored, bilaterally clear to aspiration- no crackles wheezes rales or rhonchi   Heart:  RRR, s1 and s2; no murmurs rubs or gallops, no edema, + pedal pulses   Abdomen:  soft, non-distended, active bowel sounds, no hepatomegaly, no splenomegaly. Non-tender   Genitourinary:  dove cath in place; Extremities:   no clubbing, cyanosis; no joint effusions or swelling; Full ROM of all large joints to the upper and lower extremities; muscle mass appropriate for age   Neurologic:  No gross focal sensory abnormalities; 5/5 muscle strength to upper and lower extremities. Speech appropirate. Cranial nerves intact   Psychiatric:   appropriate and interactive. Labs: Results:   Chemistry Recent Labs     09/17/21  1000   GLU 95   *   K 4.2      CO2 24   BUN 31*   CREA 2.29*   CA 7.6*   AGAP 6   BUCR 14   AP 79   TP 7.2   ALB 2.0*   GLOB 5.2*   AGRAT 0.4*      CBC w/Diff Recent Labs     09/17/21  0928   WBC 15.3*   RBC 3.63*   HGB 10.5*   HCT 32.2*   *   GRANS 79*   LYMPH 9*   EOS 2            Lab Results   Component Value Date/Time    Specimen Description: STOOL 03/16/2014 02:00 PM    Lab Results   Component Value Date/Time    Culture result: No growth (<1,000 CFU/ML) 08/27/2021 01:43 PM    Culture result: NO GROWTH 6 DAYS 08/27/2021 03:30 AM    Culture result: NO GROWTH 6 DAYS 08/27/2021 03:15 AM    Culture result:  03/16/2014 02:00 PM     NO AEROMONAS,SALMONELLA,SHIGELLA,E. COLI 0:157 OR CAMPYLOBACTER ISOLATED          Imaging:      All imaging reviewed from Admission to present as per radiology interpretation in [address] [place] [address]

## 2021-09-17 NOTE — ED PROVIDER NOTES
EMERGENCY DEPARTMENT HISTORY AND PHYSICAL EXAM  This was created with voice recognition software and transcription errors may be present. 9:16 AM  Date: 9/17/2021  Patient Name: Juan Prado    History of Presenting Illness     Chief Complaint:    History Provided By:     HPI: Juan Prado is a 80 y.o. male past medical history of allergy anxiety BPH diverticulosis gross hematuria BPH nocturia who presents for decreased urinary output. Patient has a permanent indwelling Du was brought in on the 10th for diarrhea and urinary retention. Patient is awake and alert without complaint. Patient had urinary retention on the 11th he had multiple failed attempts by nursing staff to place in the easily placed a 16 [address] coudé. PCP: Jose De Jesus Emerson MD      Past History     Past Medical History:  Past Medical History:   Diagnosis Date    Allergy     Anxiety states     Arthritis     Benign essential hypertension     Depressive disorder     Diverticulitis     Dizziness and giddiness     Elevated prostate specific antigen (PSA)     Glaucoma     Gross hematuria     Hypertrophy of prostate with urinary obstruction and other lower urinary tract symptoms (LUTS)     Nocturia     Osteoarthrosis involving multiple sites     Tremor        Past Surgical History:  Past Surgical History:   Procedure Laterality Date    HX APPENDECTOMY      HX HERNIA REPAIR      HX OTHER SURGICAL      rectal fissure surgery    HX OTHER SURGICAL Bilateral     eye surgery       Family History:  Family History   Problem Relation Age of Onset    No Known Problems Mother     No Known Problems Father     Hypertension Maternal Grandfather     Stroke Maternal Grandfather        Social History:  Social History     Tobacco Use    Smoking status: Former Smoker    Smokeless tobacco: Never Used   Substance Use Topics    Alcohol use: Yes     Comment: occasional    Drug use: Not on file       Allergies:   Allergies   Allergen Reactions  Sulfa (Sulfonamide Antibiotics) Other (comments)     Dizziness/ passed out       Review of Systems     Review of Systems   All other systems reviewed and are negative. 10 point review of systems otherwise negative unless noted in HPI. Physical Exam       Physical Exam  Constitutional:       Appearance: He is well-developed. HENT:      Head: Normocephalic and atraumatic. Eyes:      Pupils: Pupils are equal, round, and reactive to light. Comments: Right eye is scarred   Cardiovascular:      Rate and Rhythm: Normal rate and regular rhythm. Heart sounds: Normal heart sounds. No murmur heard. No friction rub. Pulmonary:      Effort: Pulmonary effort is normal. No respiratory distress. Breath sounds: Normal breath sounds. No wheezing. Abdominal:      General: There is no distension. Palpations: Abdomen is soft. Tenderness: There is no abdominal tenderness. There is no guarding or rebound. Musculoskeletal:         General: Normal range of motion. Cervical back: Normal range of motion and neck supple. Skin:     General: Skin is warm and dry. Neurological:      Mental Status: He is alert and oriented to person, place, and time. Psychiatric:         Behavior: Behavior normal.         Thought Content: Thought content normal.         Diagnostic Study Results     Vital Signs  EKG:  Labs: None DALLIN UA noted discussed with agent from urology recommends antibiotics culture obvious to assess renal function and post renal DALLIN  Imaging: none     Medical Decision Making     ED Course: Progress Notes, Reevaluation, and Consults:    I will be the provider of record for this patient. Provider Notes (Medical Decision Making): Presents with poor urine output. Very small amount of urine in the Du bag.   Bladder scan repeatedly showed no urine in his bladder will check basic labs and hydrate    Discussed with digit from urology will start antibiotics and culture and admit overnight given post renal obstruction causing DALLIN patient is technically meeting SIRS criteria with the heart rate and white count send cultures and lactic antibiotics have been given. With  [facility] for hospitalist.        Diagnosis     Clinical Impression: No diagnosis found. Disposition:        Patient's Medications   Start Taking    No medications on file   Continue Taking    CEFPODOXIME (VANTIN) 100 MG TABLET    Take 100 mg by mouth two (2) times a day. for 5 days    FINASTERIDE (PROSCAR) 5 MG TABLET    Take 1 Tablet by mouth daily. METRONIDAZOLE (FLAGYL) 500 MG TABLET    Take 1 Tablet by mouth three (3) times daily for 10 days. Indications: diarrhea from an infection with Clostridium difficile bacteria    PANTOPRAZOLE (PROTONIX) 40 MG TABLET    take 1 tablet by mouth once daily    PROPRANOLOL (INDERAL) 10 MG TABLET    take 1 tablet by mouth twice a day    RISPERIDONE (RISPERDAL) 0.5 MG TABLET    take 1 tablet by mouth every evening    TAMSULOSIN (FLOMAX) 0.4 MG CAPSULE    Take 1 Capsule by mouth daily.     VARICELLA-ZOSTER RECOMBINANT, PF, (SHINGRIX, PF,) 50 MCG/0.5 ML SUSR INJECTION    0.5mL by IntraMUSCular route once now and then repeat in 2-6 months   These Medications have changed    No medications on file   Stop Taking    No medications on file

## 2021-09-17 NOTE — ED NOTES
Assessment of catheter shows serous drainage at catheter insertion site. Sample taken.  Kink was present in line, unkinked

## 2021-09-17 NOTE — CONSULTS
No diagnosis found. ASSESSMENT:   Acute urinary retention  BPH with LUTs, initially placed 9/10   Dove exchanged in ED 9/17  UTI   UA 9/17 mod LE, neg nitrites, 3+ bacteria    WBC 15.3   Tmax 99.3, HTN     DALLIN    Creat 2.29   Baseline Creat 1.2 (8/2021)    Gross hematuria- yellow urine during exam    Hgb 10.5    Hx elevated PSA    Presented to the ED for decreased urinary output with chronic dove in place     PLAN:    Appreciate overall management per primary  KUB shows bladder distension, dove exchanged in ED 9/17, draining well  UTI present, send for culture  Abx per primary, given Rocephin in ED  DALLIN present, continue to trend Creat  Gross hematuria now resolved, yellow urine during exam in tubing    Hand irrigated with sterile water PRN clots/decreased output   Trend Hgb  Ordered Flomax and Finasteride   Following         Follow up arranged? Pending course       Santy Haro NP-BC  [facility] of [facility]   Pager [phone]    September 17, 2021 3:40 PM    I have seen and examined Mr. Aguirre. He is resting comfortably. Urine appears to be clearing. I agree with VIANNEY Mercado's assessment and plan. Carren November Ewing Galeazzi, M.D., [facility]   Urological Oncologist   Urology of [address]   [phone]         Chief Complaint   Patient presents with    Urinary Retention       HISTORY OF PRESENT ILLNESS:  Jesica Strickland is a 80 y.o. male who is seen in consultation as referred by Dr. rBennan Pinto for acute retention and gross hematuria. Patient has a significant hx of BPH and has a catheter that was placed 9/10 for retention. Today he arrives with low urinary output and after catheter was exchanged, he has a moderate amount of purulent urine. He has a UTI and elevated WBC and an DALLIN. His temperature on arrival was 99.3. Patient has a  history of BPH and elevated PSA, he was last seen in office in 2012 with Dr. Nova Villarreal.   Today during exam, urine is draining well, yellow urine in tubing and irrigates clear with no clots. Patient has baseline confusion per caregiver, no non-verbal indicators of discomfort or apparent pain. No flowsheet data found. Past Medical History:   Diagnosis Date    Allergy     Anxiety states     Arthritis     Benign essential hypertension     Depressive disorder     Diverticulitis     Dizziness and giddiness     Elevated prostate specific antigen (PSA)     Glaucoma     Gross hematuria     Hypertrophy of prostate with urinary obstruction and other lower urinary tract symptoms (LUTS)     Nocturia     Osteoarthrosis involving multiple sites     Tremor        Past Surgical History:   Procedure Laterality Date    HX APPENDECTOMY      HX HERNIA REPAIR      HX OTHER SURGICAL      rectal fissure surgery    HX OTHER SURGICAL Bilateral     eye surgery       Social History     Tobacco Use    Smoking status: Former Smoker    Smokeless tobacco: Never Used   Substance Use Topics    Alcohol use: Yes     Comment: occasional    Drug use: Not on file       Allergies   Allergen Reactions    Sulfa (Sulfonamide Antibiotics) Other (comments)     Dizziness/ passed out       Family History   Problem Relation Age of Onset    No Known Problems Mother     No Known Problems Father     Hypertension Maternal Grandfather     Stroke Maternal Grandfather        Current Outpatient Medications   Medication Sig Dispense Refill    varicella-zoster recombinant, PF, (Shingrix, PF,) 50 mcg/0.5 mL susr injection 0.5mL by IntraMUSCular route once now and then repeat in 2-6 months 0.5 mL 1    metroNIDAZOLE (FlagyL) 500 mg tablet Take 1 Tablet by mouth three (3) times daily for 10 days. Indications: diarrhea from an infection with Clostridium difficile bacteria 30 Tablet 0    cefpodoxime (VANTIN) 100 mg tablet Take 100 mg by mouth two (2) times a day.  for 5 days      finasteride (PROSCAR) 5 mg tablet Take 1 Tablet by mouth daily. 30 Tablet 0    tamsulosin (FLOMAX) 0.4 mg capsule Take 1 Capsule by mouth daily. 30 Capsule 0    risperiDONE (RisperDAL) 0.5 mg tablet take 1 tablet by mouth every evening 120 Tablet 3    propranoloL (INDERAL) 10 mg tablet take 1 tablet by mouth twice a day 180 Tab 3    pantoprazole (PROTONIX) 40 mg tablet take 1 tablet by mouth once daily (Patient taking differently: Take 40 mg by mouth ACB/HS. take 1 tablet by mouth once daily) 120 Tab 3       Review of Systems  ROS is:     Not obtainable due to patient factors- altered mentation at baseline     Positive for:    Acute retention           PHYSICAL EXAMINATION:   Visit Vitals  BP (!) 117/57   Pulse 97   Temp 99.3 °F (37.4 °C)   Resp 14   Ht 5' 10\" (1.778 m)   Wt 63.5 kg (140 lb)   SpO2 96%   BMI 20.09 kg/m²     Constitutional: Elderly, well nourished male. No acute distress. HEENT: Normocephalic, Atraumatic, EOM's intact   CV:  no edema  Respiratory: No respiratory distress or difficulties breathing   Abdomen:  soft and non tender    Male:  No CVA tenderness  SCROTUM:  No scrotal rash or lesions noticed. Normal bilateral testes and epididymis. PENIS: Urethral meatus normal in location and size. No urethral discharge. Circumsized   16 fr dove draining yellow urine in tubing, irrigates clear, no clots   Skin: No evidence of jaundice. Normal color  Neuro/Psych:  Alert and confused at baseline, not answering questions   Caregiver at bedside         REVIEW OF LABS AND IMAGING:      XRAY 9/17   IMPRESSION     Density in the pelvis suggestive of bladder distention.       Labs: Results:   Chemistry    Recent Labs     09/17/21  1000   GLU 95   *   K 4.2      CO2 24   BUN 31*   CREA 2.29*   CA 7.6*   AGAP 6   BUCR 14   AP 79   TP 7.2   ALB 2.0*   GLOB 5.2*   AGRAT 0.4*      CBC w/Diff Recent Labs     09/17/21  0928   WBC 15.3*   RBC 3.63*   HGB 10.5*   HCT 32.2*   *   GRANS 79*   LYMPH 9*   EOS 2      Cultures No results for input(s): CULT in the last 72 hours. All Micro Results     Procedure Component Value Units Date/Time    CULTURE, BLOOD [[id number]] Collected: 09/17/21 1520    Order Status: Completed Specimen: Blood Updated: 09/17/21 1534    CULTURE, BLOOD [[id number]] Collected: 09/17/21 1505    Order Status: Completed Specimen: Blood Updated: 09/17/21 1533    CULTURE, URINE [[id number]]     Order Status: Sent Specimen: Urine from Clean catch             Urinalysis Color   Date Value Ref Range Status   09/17/2021 DARK YELLOW   Final     Appearance   Date Value Ref Range Status   09/17/2021 TURBID   Final     Specific gravity   Date Value Ref Range Status   09/17/2021 1.013 1.005 - 1.030   Final     pH (UA)   Date Value Ref Range Status   09/17/2021 5.5 5.0 - 8.0   Final     Protein   Date Value Ref Range Status   09/17/2021 100 (A) NEG mg/dL Final     Ketone   Date Value Ref Range Status   09/17/2021 Negative NEG mg/dL Final     Bilirubin   Date Value Ref Range Status   09/17/2021 Negative NEG   Final     Blood   Date Value Ref Range Status   09/17/2021 LARGE (A) NEG   Final     Urobilinogen   Date Value Ref Range Status   09/17/2021 1.0 0.2 - 1.0 EU/dL Final     Nitrites   Date Value Ref Range Status   09/17/2021 Negative NEG   Final     Leukocyte Esterase   Date Value Ref Range Status   09/17/2021 MODERATE (A) NEG   Final     Potassium   Date Value Ref Range Status   09/17/2021 4.2 3.5 - 5.5 mmol/L Final     Creatinine   Date Value Ref Range Status   09/17/2021 2.29 (H) 0.6 - 1.3 MG/DL Final     BUN   Date Value Ref Range Status   09/17/2021 31 (H) 7.0 - 18 MG/DL Final     Prostate Specific Ag   Date Value Ref Range Status   11/25/2011 9.9 (H) 0.0 - 4.0 ng/mL Final     Comment:     Roche ECLIA methodology. According to the American Urological Association, Serum PSA should  decrease and remain at undetectable levels after radical  prostatectomy.  The AUA defines biochemical recurrence as an initial  PSA value 0.2 ng/mL or greater followed by a subsequent confirmatory  PSA value 0.2 ng/mL or greater. Values obtained with different assay methods or kits cannot be used  interchangeably. Results cannot be interpreted as absolute evidence  of the presence or absence of malignant disease. Performed At: [place] [id number]  [address], [place]  [id number]  Kristy Toledo MD  [id number]      PSA No results for input(s): PSA in the last 72 hours.    Coagulation Lab Results   Component Value Date/Time    Prothrombin time 13.5 09/29/2014 05:53 PM    Prothrombin time 12.4 09/18/2012 03:27 PM    INR 1.0 09/29/2014 05:53 PM    INR 0.9 09/18/2012 03:27 PM

## 2021-09-17 NOTE — Clinical Note
Status[de-identified] INPATIENT [101]   Type of Bed: Telemetry [19]   Cardiac Monitoring Required?: Yes   Inpatient Hospitalization Certified Necessary for the Following Reasons: 3.  Patient receiving treatment that can only be provided in an inpatient setting (further clarification in H&P documentation)   Admitting Diagnosis: DALLIN (acute kidney injury) [facility]) [[id number]]   Admitting Diagnosis: Urinary retention [[id number]]   Admitting Physician: Rosa Maria Kaba   Attending Physician: Rosa Maria Kaba   Estimated Length of Stay: 2 Midnights   Discharge Plan[de-identified] Home with Office Follow-up

## 2021-09-17 NOTE — ED TRIAGE NOTES
Pt presents to ED via EMS, report given to team via home health care personnel, as follows: Home health care was provided last night, and upon morning return the same amount of urine was present in bag

## 2021-09-18 NOTE — PROGRESS NOTES
Occupational Therapy Note:   Orders received, chart reviewed and OT evaluation attempted. This patient is sleeping soundly and his sitter is reporting that he is tired from being in the ED last night. She is reporting that he has a sitter or CNA assistance throughout the day and night. Will f/u as appropriate for this patient.  Tigre Polo, OTR/L

## 2021-09-18 NOTE — PROGRESS NOTES
[facility] Progress Note    HD#:   2  POD#:  N/A  Antibiotics:  rocephin    ASSESSMENT:     1. Acute urinary retention   -BPH with LUTs, initially placed 9/10              -Dove exchanged in ED 9/17    2. UTI              UA 9/17 mod LE, neg nitrites, 3+ bacteria               WBC 15.3              Tmax 99.3, HTN                3. DALLIN    Current creatinine 2.16 (9/18)              Peak Creat 2.29 (/17)              Baseline Creat 1.2 (8/2021)     4. Hx elevated PSA    PLAN:     -urine much improved today and is clear and yellow  -continue antibiotics as outlined by the primary team.  -urine culture pending-- tailor antibiotics based on this culture  -continue flomax and finasteride for BPH  -He has a chronic dove and will be going home with this dove with follow up as an outpatient with urology  -we will be available as neeeded. Please contact us if there are any questions. SUBJECTIVE:     CC: UTI with chronic dove  Referring MD: Jennifer Owsuu MD    HPI: Feels better this AM. No issues with dove overnight.     ECOG PS: 2        OBJECTIVE:     Visit Vitals  BP (!) 145/72   Pulse 73   Temp 97.7 °F (36.5 °C)   Resp 17   Ht 5' 10\" (1.778 m)   Wt 145 lb 14.4 oz (66.2 kg)   SpO2 98%   BMI 20.93 kg/m²       Gen: Patient is in NAD   Abd: soft, non-distended   : urine is clear and yellow  Ext: No significant edema   Neuro: Grossly intact   Skin: warm and dry     Recent Results (from the past 24 hour(s))   CULTURE, BLOOD    Collection Time: 09/17/21  3:05 PM    Specimen: Blood   Result Value Ref Range    Special Requests: NO SPECIAL REQUESTS      Culture result: NO GROWTH AFTER 14 HOURS     POC LACTIC ACID    Collection Time: 09/17/21  3:11 PM   Result Value Ref Range    Lactic Acid (POC) 1.18 0.40 - 2.00 mmol/L   CULTURE, BLOOD    Collection Time: 09/17/21  3:20 PM    Specimen: Blood   Result Value Ref Range    Special Requests: NO SPECIAL REQUESTS      Culture result: NO GROWTH AFTER 14 HOURS METABOLIC PANEL, BASIC    Collection Time: 09/18/21 12:45 AM   Result Value Ref Range    Sodium 137 136 - 145 mmol/L    Potassium 3.7 3.5 - 5.5 mmol/L    Chloride 109 100 - 111 mmol/L    CO2 23 21 - 32 mmol/L    Anion gap 5 3.0 - 18 mmol/L    Glucose 79 74 - 99 mg/dL    BUN 31 (H) 7.0 - 18 MG/DL    Creatinine 2.16 (H) 0.6 - 1.3 MG/DL    BUN/Creatinine ratio 14 12 - 20      GFR est AA 34 (L) >60 ml/min/1.73m2    GFR est non-AA 28 (L) >60 ml/min/1.73m2    Calcium 7.3 (L) 8.5 - 10.1 MG/DL   MAGNESIUM    Collection Time: 09/18/21 12:45 AM   Result Value Ref Range    Magnesium 2.0 1.6 - 2.6 mg/dL   CBC W/O DIFF    Collection Time: 09/18/21 12:45 AM   Result Value Ref Range    WBC 11.8 4.6 - 13.2 K/uL    RBC 2.88 (L) 4.35 - 5.65 M/uL    HGB 8.4 (L) 13.0 - 16.0 g/dL    HCT 26.4 (L) 36.0 - 48.0 %    MCV 91.7 78.0 - 100.0 FL    MCH 29.2 24.0 - 34.0 PG    MCHC 31.8 31.0 - 37.0 g/dL    RDW 14.2 11.6 - 14.5 %    PLATELET 511 701 - 062 K/uL    MPV 10.6 9.2 - 11.8 FL   LACTIC ACID    Collection Time: 09/18/21 12:45 AM   Result Value Ref Range    Lactic acid 0.8 0.4 - 2.0 MMOL/L         Nura Valdez MD, [address]   Urologic Oncology  [facility] [address] [place] [address]     of Urology  Department of Urology  Mamie Morton.  [place], [address]     Pager:  [phone]  Office:  [phone]

## 2021-09-18 NOTE — PROGRESS NOTES
Bedside and Verbal shift change report given to Chely Del Valle Rn (oncoming nurse) by Anabel Cornejo (offgoing nurse). Report included the following information SBAR, Kardex and Recent Results.

## 2021-09-18 NOTE — ED NOTES
Charted by by Milly Blanco RN (offgoing nurse). Report included the following information SBAR, Kardex, MAR and Recent Results.     SITUATION:    Code Status: Prior   Reason for Admission: DALLIN (acute kidney injury) ([facility] [N17.9]   Urinary retention [R33.9]    [facility] day: 0   Problem List:       Hospital Problems  Date Reviewed: 9/17/2021        Codes Class Noted POA    DALLIN (acute kidney injury) ([facility] ICD-10-CM: N17.9  ICD-9-CM: 584.9  9/17/2021 Unknown        Urinary retention ICD-10-CM: R33.9  ICD-9-CM: 788.20  8/27/2021 Unknown              BACKGROUND:    Past Medical History:   Past Medical History:   Diagnosis Date    Allergy     Anxiety states     Arthritis     Benign essential hypertension     Depressive disorder     Diverticulitis     Dizziness and giddiness     Elevated prostate specific antigen (PSA)     Glaucoma     Gross hematuria     Hypertrophy of prostate with urinary obstruction and other lower urinary tract symptoms (LUTS)     Nocturia     Osteoarthrosis involving multiple sites     Tremor          Patient taking anticoagulants no     ASSESSMENT:    Changes in Assessment Throughout Shift: no     Patient has Central Line: no Reasons if yes: n/a   Patient has Du Cath: yes Reasons if yes: urinary retention      Last Vitals:     Vitals:    09/17/21 1645 09/17/21 1700 09/17/21 1715 09/17/21 1800   BP: 115/65 123/65 116/63 (!) 108/58   Pulse: 100 98 (!) 101 99   Resp: 15 16 15 18   Temp:       SpO2: 94% 94% 95% 94%   Weight:       Height:            IV and DRAINS (will only show if present)         WOUND (if present)   Wound Type:  none   Dressing present     Wound Concerns/Notes:  none     PAIN    Pain Assessment    Pain Intensity 1: 0 (09/17/21 0813)              Patient Stated Pain Goal: 0  o Interventions for Pain:  none  o Intervention effective: no  o Time of last intervention: n/a   o Reassessment Completed: no      Last 3 Weights:  Last 3 Recorded Weights in this Encounter    09/17/21 0813   Weight: 63.5 kg (140 lb)     Weight change:      INTAKE/OUPUT    Current Shift: No intake/output data recorded. Last three shifts: 09/16 0701 - 09/17 1900  In: 2000 [I.V.:2000]  Out: -      LAB RESULTS     Recent Labs     09/17/21  0928   WBC 15.3*   HGB 10.5*   HCT 32.2*   *        Recent Labs     09/17/21  1000   *   K 4.2   GLU 95   BUN 31*   CREA 2.29*   CA 7.6*       RECOMMENDATIONS AND DISCHARGE PLANNING     1. Pending tests/procedures/ Plan of Care or Other Needs: daily labs, abt admin     2. Discharge plan for patient and Needs/Barriers: abt admin    3. Estimated Discharge Date: 2 days Posted on Whiteboard in Patients Room: no      4. The patient's care plan was reviewed with the oncoming nurse. \"HEALS\" SAFETY CHECK      Fall Risk         Safety Measures:      A safety check occurred in the patient's room between off going nurse and oncoming nurse listed above. The safety check included the below items  Area Items   H  High Alert Medications - Verify all high alert medication drips (heparin, PCA, etc.)   E  Equipment - Suction is set up for ALL patients (with karen)  - Red plugs utilized for all equipment (IV pumps, etc.)  - WOWs wiped down at end of shift.  - Room stocked with oxygen, suction, and other unit-specific supplies   A  Alarms - Bed alarm is set for fall risk patients  - Ensure chair alarm is in place and activated if patient is up in a chair   L  Lines - Check IV for any infiltration  - Du bag is empty if patient has a Du   - Tubing and IV bags are labeled   S  Safety   - Room is clean, patient is clean, and equipment is clean. - Hallways are clear from equipment besides carts. - Fall bracelet on for fall risk patients  - Ensure room is clear and free of clutter  - Suction is set up for ALL patients (with karen)  - Hallways are clear from equipment besides carts.    - Isolation precautions followed, supplies available outside room, sign posted     Jaswinder Pearson RN

## 2021-09-18 NOTE — PROGRESS NOTES
Problem: Pressure Injury - Risk of  Goal: *Prevention of pressure injury  Description: Document Joe Scale and appropriate interventions in the flowsheet. Outcome: Progressing Towards Goal  Note: Pressure Injury Interventions:       Moisture Interventions: Absorbent underpads, Apply protective barrier, creams and emollients, Assess need for specialty bed, Check for incontinence Q2 hours and as needed, Internal/External urinary devices, Offer toileting Q_hr, Moisture barrier, Minimize layers, Maintain skin hydration (lotion/cream)    Activity Interventions: Assess need for specialty bed, Increase time out of bed, Pressure redistribution bed/mattress(bed type), PT/OT evaluation    Mobility Interventions: Assess need for specialty bed, Chair cushion, Float heels, HOB 30 degrees or less, Pressure redistribution bed/mattress (bed type), PT/OT evaluation, Turn and reposition approx. every two hours(pillow and wedges)    Nutrition Interventions: Document food/fluid/supplement intake, Discuss nutritional consult with provider, Offer support with meals,snacks and hydration    Friction and Shear Interventions: Apply protective barrier, creams and emollients, Foam dressings/transparent film/skin sealants, HOB 30 degrees or less, Lift sheet, Minimize layers, Sit at 90-degree angle, Transfer aides:transfer board/Radha lift/ceiling lift                Problem: Falls - Risk of  Goal: *Absence of Falls  Description: Document Miroslava Fall Risk and appropriate interventions in the flowsheet.   Outcome: Progressing Towards Goal  Note: Fall Risk Interventions:  Mobility Interventions: Assess mobility with egress test, Bed/chair exit alarm, Communicate number of staff needed for ambulation/transfer, OT consult for ADLs, Patient to call before getting OOB, PT Consult for mobility concerns, PT Consult for assist device competence, Strengthening exercises (ROM-active/passive), Utilize walker, cane, or other assistive device    Mentation Interventions: Adequate sleep, hydration, pain control, Bed/chair exit alarm, Door open when patient unattended, Evaluate medications/consider consulting pharmacy, Eyeglasses and hearing aids, Familiar objects from home, Family/sitter at bedside, More frequent rounding, Reorient patient, Room close to nurse's station, Toileting rounds, Update white board    Medication Interventions: Bed/chair exit alarm, Evaluate medications/consider consulting pharmacy, Patient to call before getting OOB, Teach patient to arise slowly, Utilize gait belt for transfers/ambulation    Elimination Interventions: Bed/chair exit alarm, Call light in reach, Patient to call for help with toileting needs, Stay With Me (per policy), Toilet paper/wipes in reach, Toileting schedule/hourly rounds              Problem: Urinary Tract Infection - Adult  Goal: *Absence of infection signs and symptoms  Outcome: Progressing Towards Goal     Problem: General Medical Care Plan  Goal: *Vital signs within specified parameters  Outcome: Progressing Towards Goal     Problem: General Medical Care Plan  Goal: *Labs within defined limits  Outcome: Progressing Towards Goal     Problem: General Medical Care Plan  Goal: *Absence of infection signs and symptoms  Outcome: Progressing Towards Goal     Problem: General Medical Care Plan  Goal: *Optimal pain control at patient's stated goal  Outcome: Progressing Towards Goal     Problem: General Medical Care Plan  Goal: *Skin integrity maintained  Outcome: Progressing Towards Goal     Problem: General Medical Care Plan  Goal: *Fluid volume balance  Outcome: Progressing Towards Goal     Problem: General Medical Care Plan  Goal: *Optimize nutritional status  Outcome: Progressing Towards Goal     Problem: General Medical Care Plan  Goal: *Anxiety reduced or absent  Outcome: Progressing Towards Goal     Problem: General Medical Care Plan  Goal: *Progressive mobility and function (eg: ADL's)  Outcome: Progressing Towards Goal

## 2021-09-18 NOTE — PROGRESS NOTES
visited with Adia Caldwell, the friend of Delores Jacobs, who is a 80 y.o.,male while he slept. The  provided the following Interventions:  Initiated a relationship of care and support as I inquired and engaged with Mr. Mariah Marrero friend concerning Mr. Aguirre's and her coping. Left literature for Mr. Aguirre sharing about the availability of [address] if desired. Plan:  Chaplains will continue to follow and will provide pastoral care on an as needed/requested basis.  recommends bedside caregivers page  on duty if patient shows signs of acute spiritual or emotional distress.       [address] [place]   [phone]

## 2021-09-19 NOTE — PROGRESS NOTES
Problem: Mobility Impaired (Adult and Pediatric)  Goal: *Acute Goals and Plan of Care (Insert Text)  Description: Physical Therapy Goals  Initiated 9/19/2021 and to be accomplished within 7 day(s)  1. Patient will move from supine to sit and sit to supine  in bed with minimal assistance/contact guard assist.    2.  Patient will transfer from bed to chair and chair to bed with moderate assistance  using the least restrictive device. 3.  Patient will perform sit to stand with moderate assistance . 4. Patient will sit on EOB for 5 minutes with CGA and no LOB    PLOF: Info received from caregiver in room. Pt has caregivers 24/7 to assist with all ADLs. He is able to amb short distances with assist from caregivers. Outcome: Progressing Towards Goal     PHYSICAL THERAPY EVALUATION    Patient: Susan Sanchez (22 y.o. male)  Date: 9/19/2021  Primary Diagnosis: DALLIN (acute kidney injury) ([facility] [address] [N17.9]  Urinary retention [R33.9]        Precautions:  Fall    ASSESSMENT :  Based on the objective data described below, the patient presents with generalized weakness, confusion, decreased functional mobility tolerance. Pt found sidelying in bed, awoken by voice, in nAD, caregiver at bedside. Pt presents with a flat affect and can answer simple questions. He is only oriented to his name. Pt declined sitting on EOB, objective exam performed in bed. Grossly 3+/5 strength B Le's. Pt remained sidelying with call bell and all needs met, caregiver getting ready to assist pt with eating. Pt left with call bell and all needs met. Patient will be placed on a 3-day trial for acute PT at this time to assess their ability to progress with all functional mobility. Patient will benefit from skilled intervention to address the above impairments.   Patient's rehabilitation potential is considered to be Fair  Factors which may influence rehabilitation potential include:   []         None noted  [x]         Mental ability/status  [x] Medical condition  []         Home/family situation and support systems  []         Safety awareness  []         Pain tolerance/management  []         Other:      PLAN :  Recommendations and Planned Interventions:   [x]           Bed Mobility Training             [x]    Neuromuscular Re-Education  [x]           Transfer Training                   []    Orthotic/Prosthetic Training  [x]           Gait Training                          []    Modalities  [x]           Therapeutic Exercises           []    Edema Management/Control  [x]           Therapeutic Activities            [x]    Family Training/Education  [x]           Patient Education  []           Other (comment):    Frequency/Duration: Patient will be followed by physical therapy 1-2 times per day/4-7 days per week to address goals. Discharge Recommendations: Home Health with continued 24/7 care/supervision  Further Equipment Recommendations for Discharge: N/A     SUBJECTIVE:   Patient stated i'm 80years old.     OBJECTIVE DATA SUMMARY:     Past Medical History:   Diagnosis Date    Allergy     Anxiety states     Arthritis     Benign essential hypertension     Depressive disorder     Diverticulitis     Dizziness and giddiness     Elevated prostate specific antigen (PSA)     Glaucoma     Gross hematuria     Hypertrophy of prostate with urinary obstruction and other lower urinary tract symptoms (LUTS)     Nocturia     Osteoarthrosis involving multiple sites     Tremor      Past Surgical History:   Procedure Laterality Date    HX APPENDECTOMY      HX HERNIA REPAIR      HX OTHER SURGICAL      rectal fissure surgery    HX OTHER SURGICAL Bilateral     eye surgery     Barriers to Learning/Limitations: yes;  altered mental status (i.e.Sedation, Confusion)  Compensate with: N/A  Home Situation:  Home Situation  Home Environment: Private residence  # Steps to Enter: 3  One/Two Story Residence: One story  Living Alone: No  Support Systems: Caregiver/Home Care Staff (24/7 supv and assist)  Patient Expects to be Discharged to[de-identified] House  Current DME Used/Available at Home: Gaetana Scales, rolling, Shower chair, Grab bars  Tub or Shower Type: Shower  Critical Behavior:  Neurologic State: Alert;Drowsy  Orientation Level: Oriented to person  Cognition: Decreased command following;Decreased attention/concentration  Safety/Judgement: Fall prevention  Psychosocial  Patient Behaviors: Calm; Cooperative;Flat affect  Purposeful Interaction: Yes  Pt Identified Daily Priority: Clinical issues (comment)  Caritas Process: Nurture loving kindness  Caring Interventions: Reassure  Reassure: Therapeutic listening    Strength:    Strength: Generally decreased, functional    Tone & Sensation:   Tone: Normal       Range Of Motion:  AROM: Generally decreased, functional    Pain:  Pain level pre-treatment: 0/10   Pain level post-treatment: 0/10   Pain Intervention(s) : Medication (see MAR); Rest, Ice, Repositioning  Response to intervention: Nurse notified, See doc flow    Activity Tolerance:   Pt unwilling to perform OOB activity  Please refer to the flowsheet for vital signs taken during this treatment. After treatment:   []         Patient left in no apparent distress sitting up in chair  [x]         Patient left in no apparent distress in bed  [x]         Call bell left within reach  [x]         Nursing notified  [x]         Caregiver present  []         Bed alarm activated  []         SCDs applied    COMMUNICATION/EDUCATION:   [x]         Role of Physical Therapy in the acute care setting. [x]         Fall prevention education was provided and the patient/caregiver indicated understanding. [x]         Patient/family have participated as able in goal setting and plan of care. []         Patient/family agree to work toward stated goals and plan of care. []         Patient understands intent and goals of therapy, but is neutral about his/her participation.   []         Patient is unable to participate in goal setting/plan of care: ongoing with therapy staff.  []         Other:     Thank you for this referral.  Kylie Jacqueline   Time Calculation: 8 mins      Eval Complexity: History: LOW Complexity : Zero comorbidities / personal factors that will impact the outcome / POCExam:LOW Complexity : 1-2 Standardized tests and measures addressing body structure, function, activity limitation and / or participation in recreation  Presentation: LOW Complexity : Stable, uncomplicated  Clinical Decision Making:Low Complexity    overall Complexity:LOW

## 2021-09-19 NOTE — PROGRESS NOTES
[facility]  Progress Note    Patient: Kun Abarca Age: 80 y.o. : 1922 MR#: [id number] SSN: [id number]  Date/Time: 2021    Subjective:     Pt seen with cargiver at bedside. Per caregiver pt doing better overall, appetite not as good however. Pt somnolent, wakes up with tactile stimulus. Appears comfortable. Did not answer any of my questions.     Assessment/Plan:   80year old male with h/o bph and indwelling dove placed on 9/10/21 admitted after presenting with reports of no urinary output.    -Acute urinary obstruction in setting of BPH and dove: resolved after dove exchanged, urology input noted  -Complicated urinary tract infection: on abx, initial cbc w/ leukocytosis, urine cx pending, blood cx ngtd  -DALLIN due to urinary retention: creat with some downward trend    HISTORY OF:  -BPH  -HTN  -Hematuria    PLAN:  -Cont abx, monitor cx  -Follow creat  Additional Notes:      Case discussed with:  [x]Patient  []Family  []Nursing  []Case Management  DVT Prophylaxis:  [x]Lovenox  []Hep SQ  []SCDs  []Coumadin   []On Heparin gtt    Objective:   VS:   Visit Vitals  BP (!) 145/72   Pulse 73   Temp 97.7 °F (36.5 °C)   Resp 17   Ht 5' 10\" (1.778 m)   Wt 66.2 kg (145 lb 14.4 oz)   SpO2 98%   BMI 20.93 kg/m²      Tmax/24hrs: Temp (24hrs), Av.9 °F (36.6 °C), Min:97.7 °F (36.5 °C), Max:98.3 °F (36.8 °C)    Input/Output:     Intake/Output Summary (Last 24 hours) at 2021  Last data filed at 2021 0630  Gross per 24 hour   Intake 20 ml   Output 550 ml   Net -530 ml       General: somnolent, opens eyes with tactile stimulus   Cardiovascular:  RRR, no murmurs  Pulmonary:  CTAB  GI:  abd soft, nt, nd  Extremities: No edema   Additional:      Labs:    Recent Results (from the past 24 hour(s))   METABOLIC PANEL, BASIC    Collection Time: 21 12:45 AM   Result Value Ref Range    Sodium 137 136 - 145 mmol/L    Potassium 3.7 3.5 - 5.5 mmol/L    Chloride 109 100 - 111 mmol/L    CO2 23 21 - 32 mmol/L    Anion gap 5 3.0 - 18 mmol/L    Glucose 79 74 - 99 mg/dL    BUN 31 (H) 7.0 - 18 MG/DL    Creatinine 2.16 (H) 0.6 - 1.3 MG/DL    BUN/Creatinine ratio 14 12 - 20      GFR est AA 34 (L) >60 ml/min/1.73m2    GFR est non-AA 28 (L) >60 ml/min/1.73m2    Calcium 7.3 (L) 8.5 - 10.1 MG/DL   MAGNESIUM    Collection Time: 09/18/21 12:45 AM   Result Value Ref Range    Magnesium 2.0 1.6 - 2.6 mg/dL   CBC W/O DIFF    Collection Time: 09/18/21 12:45 AM   Result Value Ref Range    WBC 11.8 4.6 - 13.2 K/uL    RBC 2.88 (L) 4.35 - 5.65 M/uL    HGB 8.4 (L) 13.0 - 16.0 g/dL    HCT 26.4 (L) 36.0 - 48.0 %    MCV 91.7 78.0 - 100.0 FL    MCH 29.2 24.0 - 34.0 PG    MCHC 31.8 31.0 - 37.0 g/dL    RDW 14.2 11.6 - 14.5 %    PLATELET 432 059 - 190 K/uL    MPV 10.6 9.2 - 11.8 FL   LACTIC ACID    Collection Time: 09/18/21 12:45 AM   Result Value Ref Range    Lactic acid 0.8 0.4 - 2.0 MMOL/L     Additional Data Reviewed:      Signed By: Dioni Escobar MD     September 18, 2021 8:11 PM

## 2021-09-19 NOTE — PROGRESS NOTES
[facility] Hospitalist Group  Progress Note    Patient: Jesica Strickland Age: 80 y.o. : 1922 MR#: [id number] SSN: [id number]  Date/Time: 2021    Subjective:     Pt seen with cargiver at bedside. Per caregiver pt doing better overall, appetite better. Pt easily wakes up with verbal stimulus. Appears comfortable. Shook head yes to my question if he's doing okay.     Assessment/Plan:   80year old male with h/o bph and indwelling dove placed on 9/10/21 admitted after presenting with reports of no urinary output.    -Acute urinary obstruction in setting of BPH and dove: resolved after dove exchanged, urology input noted  -Complicated urinary tract infection: on abx, initial cbc w/ leukocytosis, urine cx NGTD, blood cx ngtd  -DALLIN due to urinary retention: creat with some downward trend    HISTORY OF:  -BPH  -HTN  -Hematuria    PLAN:  -Cont abx, monitor cx  -Follow creat    Dispo: possible dc in next 24-48 hrs of cx remain negative  Additional Notes:      Case discussed with:  [x]Patient  []Family  []Nursing  []Case Management  DVT Prophylaxis:  [x]Lovenox  []Hep SQ  []SCDs  []Coumadin   []On Heparin gtt    Objective:   VS:   Visit Vitals  /60 (BP 1 Location: Left arm, BP Patient Position: At rest)   Pulse 68   Temp 97.7 °F (36.5 °C)   Resp 18   Ht 5' 10\" (1.778 m)   Wt 64.4 kg (142 lb)   SpO2 98%   BMI 20.37 kg/m²      Tmax/24hrs: Temp (24hrs), Av.1 °F (36.7 °C), Min:97.7 °F (36.5 °C), Max:98.3 °F (36.8 °C)    Input/Output:     Intake/Output Summary (Last 24 hours) at 2021 1603  Last data filed at 2021 0420  Gross per 24 hour   Intake 140 ml   Output 800 ml   Net -660 ml       General: somnolent, opens eyes with tactile stimulus   Cardiovascular:  RRR, no murmurs  Pulmonary:  CTAB  GI:  abd soft, nt, nd  Extremities: No edema   Additional:      Labs:    Recent Results (from the past 24 hour(s))   METABOLIC PANEL, BASIC    Collection Time: 21  2:25 AM   Result Value Ref Range    Sodium 138 136 - 145 mmol/L    Potassium 3.8 3.5 - 5.5 mmol/L    Chloride 108 100 - 111 mmol/L    CO2 23 21 - 32 mmol/L    Anion gap 7 3.0 - 18 mmol/L    Glucose 64 (L) 74 - 99 mg/dL    BUN 29 (H) 7.0 - 18 MG/DL    Creatinine 1.65 (H) 0.6 - 1.3 MG/DL    BUN/Creatinine ratio 18 12 - 20      GFR est AA 47 (L) >60 ml/min/1.73m2    GFR est non-AA 39 (L) >60 ml/min/1.73m2    Calcium 7.4 (L) 8.5 - 10.1 MG/DL   MAGNESIUM    Collection Time: 09/19/21  2:25 AM   Result Value Ref Range    Magnesium 2.0 1.6 - 2.6 mg/dL   CBC W/O DIFF    Collection Time: 09/19/21  2:25 AM   Result Value Ref Range    WBC 9.5 4.6 - 13.2 K/uL    RBC 2.92 (L) 4.35 - 5.65 M/uL    HGB 8.6 (L) 13.0 - 16.0 g/dL    HCT 26.8 (L) 36.0 - 48.0 %    MCV 91.8 78.0 - 100.0 FL    MCH 29.5 24.0 - 34.0 PG    MCHC 32.1 31.0 - 37.0 g/dL    RDW 14.4 11.6 - 14.5 %    PLATELET 154 089 - 790 K/uL    MPV 10.3 9.2 - 11.8 FL     Additional Data Reviewed:      Signed By: Kahlil Ford MD     September 19, 2021 8:11 PM

## 2021-09-19 NOTE — PROGRESS NOTES
Problem: Self Care Deficits Care Plan (Adult)  Goal: *Acute Goals and Plan of Care (Insert Text)  Description: Occupational Therapy Goals  Initiated 9/19/2021 within 7 day(s). 1.  Patient will perform self-feeding with supervision/set-up. 2.  Patient will perform grooming with standby assist.  3.  Patient will perform bed mobility with Min A from supine <-> sit edge of bed in prep for ADL routine. 4.  Patient will participate in upper extremity therapeutic exercise/activities with supervision/set-up for 8 minutes. Prior Level of Function: per caregiver Kev Finney, pt has Home health aides 24/7 supv with assist for ADLs in walk in shower and toileting, used RW for functional mobility to kitchen and bathroom with Supv-SBA     Outcome: Progressing Towards Goal   OCCUPATIONAL THERAPY EVALUATION    Patient: Rivka Fields (72 y.o. male)  Date: 9/19/2021  Primary Diagnosis: DALLIN (acute kidney injury) ([facility] [address] [N17.9]  Urinary retention [R33.9]        Precautions:   Fall  PLOF:per caregiver Kev Finney, pt has Home health aides 24/7 supv with assist for ADLs in walk in shower and toileting, used RW for functional mobility to kitchen and bathroom with Supv-SBA    ASSESSMENT :  Nursing/RN cleared for pt to participate in OT evaluation and tx session. Patient presents sidelying on right side in bed, sitter and home health aide/caregiver at [facility] present and provide accurate PLOF, d/t pt presents with drowsiness. Pt able to follow simple commands, BUE AROM WFL, MMT 3+/5. Per Kev Finney, pt requires total assist this date with self feeding and was Supv at [facility]. Pt will benefit from 3 day trial to address stated goals in order to d/c home safely at [facility]. Sitter remained at bedside at end of tx session. Patient will benefit from skilled intervention to address the above impairments.   Patient's rehabilitation potential is considered to be Fair  Factors which may influence rehabilitation potential include:   [] None noted  [x]             Mental ability/status  [x]             Medical condition  []             Home/family situation and support systems  []             Safety awareness  []             Pain tolerance/management  []             Other:      PLAN :  Recommendations and Planned Interventions:   [x]               Self Care Training                  [x]      Therapeutic Activities  [x]               Functional Mobility Training   []      Cognitive Retraining  [x]               Therapeutic Exercises           [x]      Endurance Activities  [x]               Balance Training                    [x]      Neuromuscular Re-Education  []               Visual/Perceptual Training     [x]      Home Safety Training  [x]               Patient Education                   [x]      Family Training/Education  []               Other (comment):    Frequency/Duration: Patient will be followed by occupational therapy for 3 day trials, as progress is achieved, continue 1-2 times per day/4-7 days per week to address goals. Discharge Recommendations: Home Health with continued 24/7 supv and assist e.g. ADLs  Further Equipment Recommendations for Discharge: pt has all recommended DME     SUBJECTIVE:   Patient stated I'm tired.     OBJECTIVE DATA SUMMARY:     Past Medical History:   Diagnosis Date    Allergy     Anxiety states     Arthritis     Benign essential hypertension     Depressive disorder     Diverticulitis     Dizziness and giddiness     Elevated prostate specific antigen (PSA)     Glaucoma     Gross hematuria     Hypertrophy of prostate with urinary obstruction and other lower urinary tract symptoms (LUTS)     Nocturia     Osteoarthrosis involving multiple sites     Tremor      Past Surgical History:   Procedure Laterality Date    HX APPENDECTOMY      HX HERNIA REPAIR      HX OTHER SURGICAL      rectal fissure surgery    HX OTHER SURGICAL Bilateral     eye surgery     Barriers to Learning/Limitations: yes;  cognitive, sensory deficits-vision/hearing/speech, and altered mental status (i.e.Sedation, Confusion)  Compensate with: visual, verbal, tactile, kinesthetic cues/model    Home Situation:   Home Situation  Home Environment: Private residence  # Steps to Enter: 3  One/Two Story Residence: One story  Living Alone: No  Support Systems: Caregiver/Home Care Staff (24/7 supv and assist)  Patient Expects to be Discharged to[de-identified] House  Current DME Used/Available at Home: Delsie Frock, rolling, Shower chair, Grab bars  Tub or Shower Type: Shower  []  Right hand dominant   []  Left hand dominant    Cognitive/Behavioral Status:  Neurologic State: Alert;Drowsy  Orientation Level: Oriented to person  Cognition: Decreased command following;Decreased attention/concentration  Safety/Judgement: Fall prevention    Skin: appears intact  Edema: none noted    Vision/Perceptual:  pt declined to read time on clock d/t fatigue       Coordination: BUE  Coordination: Generally decreased, functional  Fine Motor Skills-Upper: Left Intact; Right Intact    Gross Motor Skills-Upper: Left Intact; Right Intact     Strength: BUE  Strength: Generally decreased, functional     Tone & Sensation: BUE  Tone: Normal     Range of Motion: BUE  AROM: Generally decreased, functional    ADL Assessment:   Feeding: Total assistance    Oral Facial Hygiene/Grooming: Total assistance    Bathing: Total assistance    Upper Body Dressing: Total assistance    Lower Body Dressing: Total assistance    Toileting: Total assistance    Cognitive Retraining  Safety/Judgement: Fall prevention    Pain:  Pain level pre-treatment: 0/10   Pain level post-treatment: 0/10     Activity Tolerance:   poor  Please refer to the flowsheet for vital signs taken during this treatment.   After treatment:   [] Patient left in no apparent distress sitting up in chair  [x] Patient left in no apparent distress in bed  [x] Call bell left within reach  [x] Nursing notified  [x] Caregiver/honorio Taylor present  [x] Bed alarm activated    COMMUNICATION/EDUCATION:   [x] Role of Occupational Therapy in the acute care setting  [x] Home safety education was provided and the patient/caregiver indicated understanding. [x] Patient/family have participated as able in goal setting and plan of care. [x] Patient/family agree to work toward stated goals and plan of care. [] Patient understands intent and goals of therapy, but is neutral about his/her participation. [] Patient is unable to participate in goal setting and plan of care. Thank you for this referral.  Mari Pill  Time Calculation: 10 mins    Eval Complexity: History: MEDIUM Complexity : Expanded review of history including physical, cognitive and psychosocial  history ; Examination: MEDIUM Complexity : 3-5 performance deficits relating to physical, cognitive , or psychosocial skils that result in activity limitations and / or participation restrictions; Decision Making:MEDIUM Complexity : Patient may present with comorbidities that affect occupational performnce.  Miniml to moderate modification of tasks or assistance (eg, physical or verbal ) with assesment(s) is necessary to enable patient to complete evaluation

## 2021-09-19 NOTE — ROUTINE PROCESS
Bedside and Verbal shift change report given to [facility] RAMÓN (oncoming nurse) by Santi Tyler (offgoing nurse). Report included the following information SBAR, Kardex and Recent Results.

## 2021-09-20 NOTE — PROGRESS NOTES
Problem: Mobility Impaired (Adult and Pediatric)  Goal: *Acute Goals and Plan of Care (Insert Text)  Description: Physical Therapy Goals  Initiated 9/19/2021 and to be accomplished within 7 day(s)  1. Patient will move from supine to sit and sit to supine  in bed with minimal assistance/contact guard assist.    2.  Patient will transfer from bed to chair and chair to bed with moderate assistance  using the least restrictive device. 3.  Patient will perform sit to stand with moderate assistance . 4. Patient will sit on EOB for 5 minutes with CGA and no LOB    PLOF: Info received from caregiver in room. Pt has caregivers 24/7 to assist with all ADLs. He is able to amb short distances with assist from caregivers. Outcome: Progressing Towards Goal   PHYSICAL THERAPY TREATMENT    Patient: Pepito Hermosillo (10 y.o. male)  Date: 9/20/2021  Diagnosis: DALLIN (acute kidney injury) ([facility] [address] [N17.9]  Urinary retention [R33.9] <principal problem not specified>       Precautions: Fall      ASSESSMENT:  Pt cleared to participate in PT session, pt received semi-reclined in bed and agreeable to therapy session with caregiver at bedside. Pt completing supine to sit with Jennie and increased time. Pt sitting on EOB with good sitting balance. Pt standing with Jennie to RW. Pt able to stand for ~10 seconds before returning to sit. Pt standing for second attempt with CGA and new chux pad placed. CGA for sit to supine and Jennie for scooting toward HOB. Pt declining further mobility at this time. Caregiver reporting he will only walk to get back home. Caregiver reports that pt ambulates at home. Pt positioned for comfort and educated to call for assist before getting up, pt verbalized understanding. Pt left with all needs met and call bell in reach. RN notified of position and participation. Day 2 of 3 day trial. Unsure of true motivation for participation.      Progression toward goals:   []      Improving appropriately and progressing toward goals  [x]      Improving slowly and progressing toward goals  []      Not making progress toward goals and plan of care will be adjusted     PLAN:  Patient continues to benefit from skilled intervention to address the above impairments. Continue treatment per established plan of care. Discharge Recommendations:  Home Health with continued 24/7 assist from caregivers vs. Rehab   Further Equipment Recommendations for Discharge:  rolling walker     SUBJECTIVE:   Patient stated I need to lay back down.     OBJECTIVE DATA SUMMARY:   Critical Behavior:  Neurologic State: Alert  Orientation Level: Oriented to person, Disoriented to time, Disoriented to situation, Disoriented to place  Cognition: Decreased command following  Safety/Judgement: Fall prevention  Functional Mobility Training:  Bed Mobility:     Supine to Sit: Minimum assistance  Sit to Supine: Contact guard assistance  Scooting: Minimum assistance    Transfers:  Sit to Stand: Minimum assistance  Stand to Sit: Contact guard assistance    Balance:  Sitting: Impaired; With support  Sitting - Static: Good (unsupported)  Sitting - Dynamic: Fair (occasional)  Standing: Impaired; With support  Standing - Static: Fair      Posture:   Posture (WDL): Exceptions to WDL   Posture Assessment: Forward head;Trunk flexion      Pain:  Pain level pre-treatment: 0/10  Pain level post-treatment: 0/10       Activity Tolerance:   Fair tolerance, limited participation     Please refer to the flowsheet for vital signs taken during this treatment. After treatment:   [] Patient left in no apparent distress sitting up in chair  [x] Patient left in no apparent distress in bed  [x] Call bell left within reach  [x] Nursing notified  [x] Caregiver present  [x] Bed alarm activated  [] SCDs applied      COMMUNICATION/EDUCATION:   [x]         Role of Physical Therapy in the acute care setting.   [x]         Fall prevention education was provided and the patient/caregiver indicated understanding. [x]         Patient/family have participated as able in working toward goals and plan of care. [x]         Patient/family agree to work toward stated goals and plan of care. []         Patient understands intent and goals of therapy, but is neutral about his/her participation.   []         Patient is unable to participate in stated goals/plan of care: ongoing with therapy staff.  []         Other:        Brandi Burns, PT   Time Calculation: 14 mins

## 2021-09-20 NOTE — PROGRESS NOTES
Physician Progress Note      PATIENT:               Montrell Garridoutant  CSN #:                  [id number]  :                       1922  ADMIT DATE:       2021 7:57 AM  DISCH DATE:  RESPONDING  PROVIDER #:        Renee Ambrocio MD          QUERY TEXT:    Pt admitted with UTI. Pt noted to have indwelling urinary catheter. If possible, please document in the progress notes and discharge summary if you are evaluating and/or treating any of the following: The medical record reflects the following:  Risk Factors: 80year old male with h/o bph and indwelling dove placed on 9/10/21 admitted after presenting with reports of no urinary output. Clinical Indicators: Per  med prog note - Acute urinary obstruction in setting of BPH and dove: resolved after dove exchanged, Complicated urinary tract infection: on abx, initial cbc w/ leukocytosis, urine cx NGTD, blood cx ngtd  Treatment: Urology consulted, urine cultures, Ceftriaxone    Thank you,  Tiffanie Kam RN, [facility]  [phone]  Options provided:  -- UTI due to chronic indwelling urinary catheter  -- UTI not due to indwelling urinary catheter  -- Other - I will add my own diagnosis  -- Disagree - Not applicable / Not valid  -- Disagree - Clinically unable to determine / Unknown  -- Refer to Clinical Documentation Reviewer    PROVIDER RESPONSE TEXT:    UTI is due to the chronic indwelling urinary catheter.     Query created by: Bertell Boxer on 2021 11:36 AM      Electronically signed by:  Renee Ambrocio MD 2021 4:09 PM

## 2021-09-20 NOTE — PROGRESS NOTES
Reason for Admission:  DALLIN (acute kidney injury) ([facility] [address] [N17.9]  Urinary retention [R33.9]                 RUR Score:    21            Plan for utilizing home health:    Currently active with [address] [place] [address]                      Likelihood of Readmission:   Moderate                         Do you (patient/family) have any concerns for transition/discharge?  no    Transition of Care Plan:       Initial assessment completed with spouse/SO. Cognitive status of patient: disoriented. Face sheet information confirmed:  yes. The patient designates significant other to participate in his discharge plan and to receive any needed information. This patient lives in a single family home with patient, spouse and other:  24/7 personal care . Patient is not able to navigate steps as needed. Prior to hospitalization, patient was considered to be independent with ADLs/IADLS : no . If not independent,  patient needs assist with : dressing, bathing, food preparation, cooking, toileting and grooming    Patient has a current ACP document on file: no      Healthcare Decision Maker:   Primary Decision Maker: Joaquín Acostatingham - Girlfrienjuan francisco - [phone]    Click here to complete [address] including selection of the Healthcare Decision Maker Relationship (ie \"Primary\")    The patient and other:  Stretcher transport will be available to transport patient home upon discharge. The patient already has Walker, W/C, BSC,  available in the home. Patient is currently active with home health. If active, agency name is [address] [place] [address]. Patient has not stayed in a skilled nursing facility or rehab. This patient is on dialysis :no    Currently, the discharge plan is Home with Home Health. The patient states that he can obtain his medications from the pharmacy, and take his medications as directed.     Patient's current insurance is Medicare and [address] Management Interventions  PCP Verified by CM: Yes  Transition of Care Consult (CM Consult): Home Health, Other (Has personal care 24/7)  [address]: No  Reason Outside [place]: Patient already serviced by other home care/hospice agency  Support Systems: Spouse/Significant Other, Caregiver/Home Care Staff  Confirm Follow Up Transport: Other (see comment) (stretcher)  The Plan for Transition of Care is Related to the Following Treatment Goals : Home with [address] [place] [address] [facility] and Personal care  The Patient and/or Patient Representative was Provided with a Choice of Provider and Agrees with the Discharge Plan?: Yes  Freedom of Choice List was Provided with Basic Dialogue that Supports the Patient's Individualized Plan of Care/Goals, Treatment Preferences and Shares the Quality Data Associated with the Providers?: Yes  Discharge Location  Discharge Placement: Home    Patient has a sitter at bedside who states he has 24/7 assistance with Ms. Lise Severin and to call her for more information. Called and spoke to Ms. Lise Severin at [phone], states patient is active with her agency and also receives 24/7 personal care assistance. Patient does live with his girlfriend of 17 yrs.     Betty Medina RN

## 2021-09-20 NOTE — PROGRESS NOTES
[facility] Hospitalist Group  Progress Note    Patient: Patrick Tapia Age: 80 y.o. : 1922 MR#: [id number] SSN: [id number]  Date/Time: 2021    Subjective:     Pt seen with puraver at bedside. Per caregiver pt doing better overall, drinking Ensure. No reports of diarrhea. Patient wakes up easily and appears comfortable. Assessment/Plan:   80year old male with h/o bph and indwelling dove placed on 9/10/21 admitted after presenting with reports of no urinary output.    -Acute urinary obstruction in setting of BPH and dove: resolved after dove exchanged, urology input noted  -Complicated urinary tract infection: on abx, initial cbc w/ leukocytosis, urine cx NGTD, blood cx ngtd  -DALLIN due to urinary retention: creat with some downward trend    HISTORY OF:  -BPH  -HTN  -Hematuria    PLAN:  -ID consulted for antibiotics. Recommendation made for one-time dose of fosfomycin. Also recommendation made for oral vancomycin for C. difficile treatment. Prescription has been sent to the outpatient pharmacy via phone and electronically. Per care management patient may need prior authorization for this medication. .  -Follow creat  -Continue Flomax and finasteride  -Per urology Dove is chronic and recommendation made for him to go home with his Dove and follow-up with them in the outpatient setting  Dispo: Discharge after arrangements made for oral vancomycin at discharge. Case discussed with caregiver and patient's healthcare point of contact Ms. Ervin Allen  Additional Notes:      Case discussed with:  [x]Patient  [x]Family  []Nursing  []Case Management  DVT Prophylaxis:  [x]Lovenox  []Hep SQ  []SCDs  []Coumadin   []On Heparin gtt    Objective:   VS:   Visit Vitals  BP (!) 148/69   Pulse 61   Temp 97.7 °F (36.5 °C)   Resp 18   Ht 5' 10\" (1.778 m)   Wt 64.4 kg (142 lb)   SpO2 98%   BMI 20.37 kg/m²      Tmax/24hrs: Temp (24hrs), Av.4 °F (36.9 °C), Min:97.7 °F (36.5 °C), Max:98.8 °F (37.1 °C)    Input/Output:     Intake/Output Summary (Last 24 hours) at 9/20/2021 1611  Last data filed at 9/20/2021 0400  Gross per 24 hour   Intake 80 ml   Output 1000 ml   Net -920 ml       General: somnolent, opens eyes with tactile stimulus   Cardiovascular:  RRR, no murmurs  Pulmonary:  CTAB  GI:  abd soft, nt, nd  Extremities: No edema   Additional:      Labs:    No results found for this or any previous visit (from the past 24 hour(s)).   Additional Data Reviewed:      Signed By: Kahlil Ford MD     September 20, 2021 8:11 PM

## 2021-09-20 NOTE — PROGRESS NOTES
PT treatment attempted at [phone], pt supine in bed with caregiver at bedside. Pt politely declining to participate at this time. Will follow up.      Thank you for this referral.   Herminio Joyner PT DPT

## 2021-09-20 NOTE — CONSULTS
Infectious Disease Consultation Note        Reason: cystitis    Current abx Prior abx   Ceftriaxone since 9/17/21      Lines:       Assessment :     31-year-old gentleman with a history of anxiety, hypertension, diverticulitis, osteoarthritis, and BPH who had originally had a Du catheter placed on 9/10/2021 due to BPH and outlet obstruction. Clinical presentation c/w acute urinary retention, probable cystitis, recent c.diff colitis    Negative urine cultures 9/17 could be due to prior antibiotics. Significant pyuria noted on urine analysis 9/17, 3+ bacteria, urinary retention concerning for cystitis. Recent history of C. Difficile-positive C. difficile PCR 9/10/2021. Status post metronidazole 9/11-9/16. Resolved diarrhea    Will attempt to avoid prolonged antibiotic for probable cystitis since recent history of C. Difficile, risk of recurrence with prolonged antibiotic use    Recommendations:    1. Discontinue ceftriaxone. Give fosfomycin 3 g p.o. single dose  2. Start oral vancomycin 125 mg oral every 6 hours for 10 days  3. Discharge planning per primary team    Recommend to give fosfomycin prior to discharge. Please give vancomycin prescription to  to ensure that insurance covers for it    Thank you for consultation request. Above plan was discussed in details with patient, and dr Miguel Peace. Please call me if any further questions or concerns. Will continue to participate in the care of this patient. HPI:     31-year-old gentleman with a history of anxiety, hypertension, diverticulitis, osteoarthritis, and BPH who had originally had a Du catheter placed on 9/10/2021 due to BPH and outlet obstruction. He has a home health nurse follow-up to follow with him. She noted that he did not have any additional urine output in the bag overnight so 1?-Reportedly brought the patient to the emergency department for further evaluation.   He was previously admitted in August of this year for similar symptoms and was treated with Vantin for urinary tract infection. At present time he denies any fevers or chills. He has had a mild decrease in p.o. intake. No other complaints.      Stool C. difficile 9/10/2021 -toxin indeterminate, PCR positive      Past Medical History:   Diagnosis Date    Allergy     Anxiety states     Arthritis     Benign essential hypertension     Depressive disorder     Diverticulitis     Dizziness and giddiness     Elevated prostate specific antigen (PSA)     Glaucoma     Gross hematuria     Hypertrophy of prostate with urinary obstruction and other lower urinary tract symptoms (LUTS)     Nocturia     Osteoarthrosis involving multiple sites     Tremor        Past Surgical History:   Procedure Laterality Date    HX APPENDECTOMY      HX HERNIA REPAIR      HX OTHER SURGICAL      rectal fissure surgery    HX OTHER SURGICAL Bilateral     eye surgery       home Medication List    Details   erythromycin (ILOTYCIN) ophthalmic ointment APPLY A THIN LAYER ON EYELID THREE TIMES A DAY      varicella-zoster recombinant, PF, (Shingrix, PF,) 50 mcg/0.5 mL susr injection 0.5mL by IntraMUSCular route once now and then repeat in 2-6 months  Qty: 0.5 mL, Refills: 1    Associated Diagnoses: Medicare annual wellness visit, subsequent      metroNIDAZOLE (FlagyL) 500 mg tablet Take 1 Tablet by mouth three (3) times daily for 10 days. Indications: diarrhea from an infection with Clostridium difficile bacteria  Qty: 30 Tablet, Refills: 0      cefpodoxime (VANTIN) 100 mg tablet Take 100 mg by mouth two (2) times a day. for 5 days      finasteride (PROSCAR) 5 mg tablet Take 1 Tablet by mouth daily. Qty: 30 Tablet, Refills: 0      tamsulosin (FLOMAX) 0.4 mg capsule Take 1 Capsule by mouth daily.   Qty: 30 Capsule, Refills: 0      risperiDONE (RisperDAL) 0.5 mg tablet take 1 tablet by mouth every evening  Qty: 120 Tablet, Refills: 3      propranoloL (INDERAL) 10 mg tablet take 1 tablet by mouth twice a day  Qty: 180 Tab, Refills: 3      pantoprazole (PROTONIX) 40 mg tablet take 1 tablet by mouth once daily  Qty: 120 Tab, Refills: 3             Current Facility-Administered Medications   Medication Dose Route Frequency    tamsulosin (FLOMAX) capsule 0.4 mg  0.4 mg Oral DAILY    finasteride (PROSCAR) tablet 5 mg  5 mg Oral DAILY    pantoprazole (PROTONIX) tablet 40 mg  40 mg Oral ACB&D    propranoloL (INDERAL) tablet 10 mg  10 mg Oral BID    risperiDONE (RisperDAL) tablet 0.5 mg  0.5 mg Oral QPM    sodium chloride (NS) flush 5-40 mL  5-40 mL IntraVENous Q8H    sodium chloride (NS) flush 5-40 mL  5-40 mL IntraVENous PRN    acetaminophen (TYLENOL) tablet 650 mg  650 mg Oral Q6H PRN    Or    acetaminophen (TYLENOL) suppository 650 mg  650 mg Rectal Q6H PRN    polyethylene glycol (MIRALAX) packet 17 g  17 g Oral DAILY PRN    ondansetron (ZOFRAN ODT) tablet 4 mg  4 mg Oral Q8H PRN    Or    ondansetron (ZOFRAN) injection 4 mg  4 mg IntraVENous Q6H PRN    enoxaparin (LOVENOX) injection 30 mg  30 mg SubCUTAneous DAILY    cefTRIAXone (ROCEPHIN) 1 g in sterile water (preservative free) 10 mL IV syringe  1 g IntraVENous Q24H       Allergies: Sulfa (sulfonamide antibiotics)    Family History   Problem Relation Age of Onset    No Known Problems Mother     No Known Problems Father     Hypertension Maternal Grandfather     Stroke Maternal Grandfather      Social History     Socioeconomic History    Marital status: UNKNOWN     Spouse name: Not on file    Number of children: Not on file    Years of education: Not on file    Highest education level: Not on file   Occupational History    Not on file   Tobacco Use    Smoking status: Former Smoker    Smokeless tobacco: Never Used   Substance and Sexual Activity    Alcohol use: Yes     Comment: occasional    Drug use: Not on file    Sexual activity: Not on file   Other Topics Concern    Not on file   Social History Narrative    Not on file Social Determinants of Health     Financial Resource Strain:     Difficulty of Paying Living Expenses:    Food Insecurity:     Worried About Running Out of Food in the Last Year:     [address] in the Last Year:    Transportation Needs:     Lack of Transportation (Medical):  Lack of Transportation (Non-Medical):    Physical Activity:     Days of Exercise per Week:     Minutes of Exercise per Session:    Stress:     Feeling of Stress :    Social Connections:     Frequency of Communication with Friends and Family:     Frequency of Social Gatherings with Friends and Family:     Attends Hindu[employer] Services:     Active Member of Clubs or Organizations:     Attends Club or Organization Meetings:     Marital Status:    Intimate Partner Violence:     Fear of Current or Ex-Partner:     Emotionally Abused:     Physically Abused:     Sexually Abused:      Social History     Tobacco Use   Smoking Status Former Smoker   Smokeless Tobacco Never Used        Temp (24hrs), Av.4 °F (36.9 °C), Min:97.7 °F (36.5 °C), Max:98.8 °F (37.1 °C)    Visit Vitals  /65   Pulse 73   Temp 98.2 °F (36.8 °C)   Resp 18   Ht 5' 10\" (1.778 m)   Wt 64.4 kg (142 lb)   SpO2 97%   BMI 20.37 kg/m²       ROS: 12 point ROS obtained in details. Pertinent positives as mentioned in HPI,   otherwise negative    Physical Exam:    General:   awake alert and oriented, appears stated age. Skin:   no rashes or skin lesions noted on limited exam   HEENT:  Normocephalic, atraumatic, PERRL, EOMI, no scleral icterus or pallor; no conjunctival hemmohage;  nasal and oral mucous are moist and without evidence of lesions. No thrush. Dentition good. Neck supple, no bruits.    Lymph Nodes:   no cervical, axillary or inguinal adenopathy   Lungs:   non-labored, bilaterally clear to aspiration- no crackles wheezes rales or rhonchi   Heart:  RRR, s1 and s2; no murmurs rubs or gallops, no edema, + pedal pulses   Abdomen:  soft, non-distended, active bowel sounds, no hepatomegaly, no splenomegaly. Non-tender   Genitourinary:  dove cath in place; Extremities:   no clubbing, cyanosis; no joint effusions or swelling; Full ROM of all large joints to the upper and lower extremities; muscle mass appropriate for age   Neurologic:  No gross focal sensory abnormalities; 5/5 muscle strength to upper and lower extremities. Speech appropirate. Cranial nerves intact   Psychiatric:   appropriate and interactive. Labs: Results:   Chemistry Recent Labs     09/19/21 0225 09/18/21  0045   GLU 64* 79    137   K 3.8 3.7    109   CO2 23 23   BUN 29* 31*   CREA 1.65* 2.16*   CA 7.4* 7.3*   AGAP 7 5   BUCR 18 14      CBC w/Diff Recent Labs     09/19/21 0225 09/18/21  0045   WBC 9.5 11.8   RBC 2.92* 2.88*   HGB 8.6* 8.4*   HCT 26.8* 26.4*    393      Microbiology Recent Labs     09/17/21  1734 09/17/21  1520 09/17/21  1505   CULT No growth (<1,000 CFU/ML) NO GROWTH 3 DAYS NO GROWTH 3 DAYS          RADIOLOGY:    All available imaging studies/reports in [facility] for this admission were reviewed      Disclaimer: Sections of this note are dictated utilizing voice recognition software, which may have resulted in some phonetic based errors in grammar and contents. Even though attempts were made to correct all the mistakes, some may have been missed, and remained in the body of the document. If questions arise, please contact our department.     Dr. Bony Miller, Infectious Disease Specialist  [phone]  September 20, 2021  10:15 AM

## 2021-09-21 NOTE — PROGRESS NOTES
Bedside and Verbal shift change report given to Giovanni Nicholas RN (oncoming nurse) by Janel Garcia (offgoing nurse). Report included the following information SBAR, Kardex and Recent Results. yes...

## 2021-09-21 NOTE — PROGRESS NOTES
Physician Progress Note      PATIENT:               Heber Hutchinson  CSN #:                  [id number]  :                       1922  ADMIT DATE:       2021 7:57 AM  DISCH DATE:  RESPONDING  PROVIDER #:        Sarah Murphy MD          QUERY TEXT:    Pt admitted with UTI. Pt noted to have indwelling urinary catheter. If possible, please document in the progress notes and discharge summary if you are evaluating and / or treating any of the following: The medical record reflects the following:  Risk Factors: 80year old male with h/o bph and indwelling dove placed on 9/10/21 admitted after presenting with reports of no urinary output. Clinical Indicators: WBC 15. .3 upon admit  HR 93, 90  Cr 2.29, 2.16  Treatment: Urine and blood cultures, ceftrixone, dove cath exchange, IV fluid hydration    Thank you,  Georgia Camilo RN, [facility]  [phone]  Options provided:  -- Severe sepsis with associated acute organ dysfunction of DALLIN due to UTI related to indwelling urinary catheter  -- Sepsis ruled out  -- Other - I will add my own diagnosis  -- Disagree - Not applicable / Not valid  -- Disagree - Clinically unable to determine / Unknown  -- Refer to Clinical Documentation Reviewer    PROVIDER RESPONSE TEXT:    This patient has sepsis ruled out.     Query created by: Modesto Jones on 2021 11:45 AM      Electronically signed by:  Sarah Murphy MD 2021 1:22 PM

## 2021-09-21 NOTE — DISCHARGE INSTRUCTIONS
Patient Education        Urinary Retention: Care Instructions  Your Care Instructions     Urinary retention means that you aren't able to urinate. In men, it is often caused by a blockage of the urinary tract from an enlarged prostate gland. In men and women, it can also be caused by an infection or nerve damage. Or it may be a side effect of a medicine. The doctor may have drained the urine from your bladder. If you still have problems passing urine, you may need to use a catheter at home. This is used to empty your bladder until the problem can be fixed. Your doctor may put a catheter in your bladder before you go home. If so, he or she will tell you when to come back to have the catheter removed. The doctor has checked you closely. But problems can develop later. If you notice any problems or new symptoms, get medical treatment right away. Follow-up care is a key part of your treatment and safety. Be sure to make and go to all appointments, and call your doctor if you are having problems. It's also a good idea to know your test results and keep a list of the medicines you take. How can you care for yourself at home? · Take your medicines exactly as prescribed. Call your doctor if you think you are having a problem with your medicine. You will get more details on the specific medicines your doctor prescribes. · Check with your doctor before you use any over-the-counter medicines. Many cold and allergy medicines, for example, can make this problem worse. Make sure your doctor knows all of the medicines, vitamins, supplements, and herbal remedies you take. · Spread out through the day the amount of fluid you drink. Do not drink a lot at bedtime. · Avoid alcohol and caffeine. · If you have been given a catheter, or if one is already in place, follow the instructions you were given. Always wash your hands before and after you handle the catheter. When should you call for help?    Call your doctor now or seek immediate medical care if:    · You cannot urinate at all, or it is getting harder to urinate.     · You have symptoms of a urinary tract infection. These may include:  ? Pain or burning when you urinate. ? A frequent need to urinate without being able to pass much urine. ? Pain in the flank, which is just below the rib cage and above the waist on either side of the back. ? Blood in your urine. ? A fever. Watch closely for changes in your health, and be sure to contact your doctor if:    · You have any problems with your catheter.     · You do not get better as expected. Where can you learn more? Go to http://www.gray.com/  Enter M244 in the search box to learn more about \"Urinary Retention: Care Instructions. \"  Current as of: February 10, 2021               Content Version: 13.0  © 3897-9047 [employer] Care instructions adapted under license by [employer] (which disclaims liability or warranty for this information). If you have questions about a medical condition or this instruction, always ask your healthcare professional. [place] [address] any warranty or liability for your use of this information. DISCHARGE SUMMARY from Nurse    PATIENT INSTRUCTIONS:  Patient armband removed and shredded      What to do at Home:  Recommended activity: Activity as tolerated,     If you experience any of the following symptoms: urinary retention, please follow up with your primary care provider or dial 911. *  Please give a list of your current medications to your Primary Care Provider. *  Please update this list whenever your medications are discontinued, doses are      changed, or new medications (including over-the-counter products) are added. *  Please carry medication information at all times in case of emergency situations.     These are general instructions for a healthy lifestyle:    No smoking/ No tobacco products/ Avoid exposure to second hand smoke  Surgeon General's Warning:  Quitting smoking now greatly reduces serious risk to your health. Obesity, smoking, and sedentary lifestyle greatly increases your risk for illness    A healthy diet, regular physical exercise & weight monitoring are important for maintaining a healthy lifestyle    You may be retaining fluid if you have a history of heart failure or if you experience any of the following symptoms:  Weight gain of 3 pounds or more overnight or 5 pounds in a week, increased swelling in our hands or feet or shortness of breath while lying flat in bed. Please call your doctor as soon as you notice any of these symptoms; do not wait until your next office visit. The discharge information has been reviewed with the caregiver. The caregiver verbalized understanding. Discharge medications reviewed with the caregiver and appropriate educational materials and side effects teaching were provided.   ___________________________________________________________________________________________________________________________________

## 2021-09-21 NOTE — PROGRESS NOTES
Infectious Disease progress Note        Reason: cystitis    Current abx Prior abx   Ceftriaxone since 9/17/21-9/20  Fosfomycin on 9/20  P.o. vancomycin since 9/20      Lines:       Assessment :     80year-old gentleman with a history of anxiety, hypertension, diverticulitis, osteoarthritis, and BPH who had originally had a Du catheter placed on 9/10/2021 due to BPH and outlet obstruction. Clinical presentation c/w acute urinary retention, probable cystitis, recent c.diff colitis    Negative urine cultures 9/17 could be due to prior antibiotics. Significant pyuria noted on urine analysis 9/17, 3+ bacteria, urinary retention concerning for cystitis. Recent history of C. Difficile-positive C. difficile PCR 9/10/2021. Status post metronidazole 9/11-9/16. Resolved diarrhea    Will attempt to avoid prolonged antibiotic for probable cystitis since recent history of C. Difficile, risk of recurrence with prolonged antibiotic use    Recommendations:    1. Recommend oral vancomycin 125 mg oral every 6 hours for total 10 days  2. Discharge planning per primary team    Above plan was discussed in details with patient, and dr Silvio Paige. Please call me if any further questions or concerns. Will continue to participate in the care of this patient. HPI:    Nonverbal  home Medication List    Details   erythromycin (ILOTYCIN) ophthalmic ointment APPLY A THIN LAYER ON EYELID THREE TIMES A DAY      varicella-zoster recombinant, PF, (Shingrix, PF,) 50 mcg/0.5 mL susr injection 0.5mL by IntraMUSCular route once now and then repeat in 2-6 months  Qty: 0.5 mL, Refills: 1    Associated Diagnoses: Medicare annual wellness visit, subsequent      metroNIDAZOLE (FlagyL) 500 mg tablet Take 1 Tablet by mouth three (3) times daily for 10 days. Indications: diarrhea from an infection with Clostridium difficile bacteria  Qty: 30 Tablet, Refills: 0      cefpodoxime (VANTIN) 100 mg tablet Take 100 mg by mouth two (2) times a day.  for 5 days      finasteride (PROSCAR) 5 mg tablet Take 1 Tablet by mouth daily. Qty: 30 Tablet, Refills: 0      tamsulosin (FLOMAX) 0.4 mg capsule Take 1 Capsule by mouth daily.   Qty: 30 Capsule, Refills: 0      risperiDONE (RisperDAL) 0.5 mg tablet take 1 tablet by mouth every evening  Qty: 120 Tablet, Refills: 3      propranoloL (INDERAL) 10 mg tablet take 1 tablet by mouth twice a day  Qty: 180 Tab, Refills: 3      pantoprazole (PROTONIX) 40 mg tablet take 1 tablet by mouth once daily  Qty: 120 Tab, Refills: 3             Current Facility-Administered Medications   Medication Dose Route Frequency    vancomycin 50 mg/mL oral solution (compounded) 125 mg  125 mg Oral Q6H    L. acidophilus,casei,rhamnosus (BIO-K PLUS) capsule 1 Capsule  1 Capsule Oral DAILY    tamsulosin (FLOMAX) capsule 0.4 mg  0.4 mg Oral DAILY    finasteride (PROSCAR) tablet 5 mg  5 mg Oral DAILY    pantoprazole (PROTONIX) tablet 40 mg  40 mg Oral ACB&D    propranoloL (INDERAL) tablet 10 mg  10 mg Oral BID    risperiDONE (RisperDAL) tablet 0.5 mg  0.5 mg Oral QPM    sodium chloride (NS) flush 5-40 mL  5-40 mL IntraVENous Q8H    sodium chloride (NS) flush 5-40 mL  5-40 mL IntraVENous PRN    acetaminophen (TYLENOL) tablet 650 mg  650 mg Oral Q6H PRN    Or    acetaminophen (TYLENOL) suppository 650 mg  650 mg Rectal Q6H PRN    polyethylene glycol (MIRALAX) packet 17 g  17 g Oral DAILY PRN    ondansetron (ZOFRAN ODT) tablet 4 mg  4 mg Oral Q8H PRN    Or    ondansetron (ZOFRAN) injection 4 mg  4 mg IntraVENous Q6H PRN    enoxaparin (LOVENOX) injection 30 mg  30 mg SubCUTAneous DAILY       Allergies: Sulfa (sulfonamide antibiotics)    Family History   Problem Relation Age of Onset    No Known Problems Mother     No Known Problems Father     Hypertension Maternal Grandfather     Stroke Maternal Grandfather      Social History     Socioeconomic History    Marital status: UNKNOWN     Spouse name: Not on file    Number of children: Not on file    Years of education: Not on file    Highest education level: Not on file   Occupational History    Not on file   Tobacco Use    Smoking status: Former Smoker    Smokeless tobacco: Never Used   Substance and Sexual Activity    Alcohol use: Yes     Comment: occasional    Drug use: Not on file    Sexual activity: Not on file   Other Topics Concern    Not on file   Social History Narrative    Not on file     Social Determinants of Health     Financial Resource Strain:     Difficulty of Paying Living Expenses:    Food Insecurity:     Worried About [address] in the Last Year:     Ran Out of Food in the Last Year:    Transportation Needs:     Lack of Transportation (Medical):     Lack of Transportation (Non-Medical):    Physical Activity:     Days of Exercise per Week:     Minutes of Exercise per Session:    Stress:     Feeling of Stress :    Social Connections:     Frequency of Communication with Friends and Family:     Frequency of Social Gatherings with Friends and Family:     Attends [employer] Services: Active Member of Clubs or Organizations:     Attends Club or Organization Meetings:     Marital Status:    Intimate Partner Violence:     Fear of Current or Ex-Partner:     Emotionally Abused:     Physically Abused:     Sexually Abused:      Social History     Tobacco Use   Smoking Status Former Smoker   Smokeless Tobacco Never Used        Temp (24hrs), Av.3 °F (36.8 °C), Min:97.7 °F (36.5 °C), Max:98.5 °F (36.9 °C)    Visit Vitals  BP (!) 172/76 (BP 1 Location: Left upper arm, BP Patient Position: Semi fowlers) Comment: Nurse notified. Pulse 70   Temp 98.3 °F (36.8 °C)   Resp 20   Ht 5' 10\" (1.778 m)   Wt 64.4 kg (142 lb)   SpO2 97%   BMI 20.37 kg/m²       ROS: 12 point ROS obtained in details. Pertinent positives as mentioned in HPI,   otherwise negative    Physical Exam:    General:   awake alert and oriented, appears stated age.    Skin:   no rashes or skin lesions noted on limited exam   HEENT: Normocephalic, atraumatic, PERRL, EOMI, no scleral icterus or pallor; no conjunctival hemmohage;  nasal and oral mucous are moist and without evidence of lesions. No thrush. Dentition good. Neck supple, no bruits. Lymph Nodes:   no cervical, axillary or inguinal adenopathy   Lungs:   non-labored, bilaterally clear to aspiration- no crackles wheezes rales or rhonchi   Heart:  RRR, s1 and s2; no  rubs or gallops, no edema, + pedal pulses   Abdomen:  soft, non-distended, active bowel sounds, no hepatomegaly, no splenomegaly. Non-tender   Genitourinary:  dove cath in place; Extremities:   no clubbing, cyanosis; no joint effusions or swelling; Full ROM of all large joints to the upper and lower extremities; muscle mass appropriate for age   Neurologic:  Does not follow commands. Detailed neurological evaluation not feasible   Psychiatric:   appropriate and interactive. Labs: Results:   Chemistry Recent Labs     09/19/21 0225   GLU 64*      K 3.8      CO2 23   BUN 29*   CREA 1.65*   CA 7.4*   AGAP 7   BUCR 18      CBC w/Diff Recent Labs     09/19/21 0225   WBC 9.5   RBC 2.92*   HGB 8.6*   HCT 26.8*         Microbiology No results for input(s): CULT in the last 72 hours. RADIOLOGY:    All available imaging studies/reports in [facility] care for this admission were reviewed      Disclaimer: Sections of this note are dictated utilizing voice recognition software, which may have resulted in some phonetic based errors in grammar and contents. Even though attempts were made to correct all the mistakes, some may have been missed, and remained in the body of the document. If questions arise, please contact our department.     Dr. Jacqueline Mckeon, Infectious Disease Specialist  [phone]  September 21, 2021  10:15 AM

## 2021-09-21 NOTE — PROGRESS NOTES
Pharmacy called with price for Vancomycin, co-pay is $36.00. RN informed and information given to the caregiver at bedside.     Kassi Stehpens RN

## 2021-09-21 NOTE — ROUTINE PROCESS
As part of the discharge instructions medication already given today were discussed with the patient's caregiver Alma Walker. The next dose due of all medications was highlighted as part of the medication discharge instructions. Discussed with the patient's caregiver pertinent information on the side effects and possible adverse reactions of medications.

## 2021-09-21 NOTE — HOME CARE
Active patient; received updated orders to resume SN/PT/OT. Patient has follow up visit scheduled for PCP (9/27/21)  and Urology (9/29/21). 24 hour caregiver Fausto lee. Additional information has been updated and arranged.   - Claribel Levin LPN

## 2021-09-21 NOTE — PROGRESS NOTES
Discharge order noted for today. Pt has been accepted to [facility] agency. Met with patient and caregiver and are agreeable to the transition plan today. Transport has been arranged through [employer].  time is 3pm. Patient's discharge summary and home health  orders have been forwarded to [facility] home health  agency via [employer]. Updated bedside RN, Susan Gan, to the transition plan.   Discharge information has been documented on the AVS.       Kassi Stephens RN

## 2021-09-21 NOTE — PROGRESS NOTES
Home Health orders received, patient is active with [facility] and office notifed of resumption of care. Hillery Kocher RN

## 2021-09-22 NOTE — DISCHARGE SUMMARY
[facility]  [facility] Summary       Patient: Connor Mccullough Age: 80 y.o. : 1922 MR#: [id number] [id number]: [id number]  PCP on record: David Muñoz MD  Admit date: 2021  Discharge date: 21    Consults:  LOKI Oh.,-ID  - SEVERINO Tipton,-urology  Procedures: none  -     Significant Diagnostic Studies: -KUB 21:  IMPRESSION     Density in the pelvis suggestive of bladder distention.  -    Discharge Diagnoses: -Acute urinary obstruction  -Complicated UTI  -DALLIN     -Recent C.diff associated diarrhea                                       Patient Active Problem List   Diagnosis Code    Hypertrophy of prostate with urinary obstruction and other lower urinary tract symptoms (LUTS) N40.1, N13.8    Elevated prostate specific antigen (PSA) R97.20    Tremor R25.1    Dizziness and giddiness R42    Benign essential hypertension I10    Anxiety states F41.1    Osteoarthrosis involving multiple sites M15.9    Depressive disorder F32.9    Memory loss R41.3    Urinary retention R33.9    UTI (urinary tract infection) N39.0    Sepsis ([address] A41.9    Leukocytosis D72.829    Fever R50.9    DALLIN (acute kidney injury) [facility]) N17.9       Hospital Course by Problem   80year old male with h/o bph and indwelling dove placed on 9/10/21 admitted after presenting with reports of no urinary output.     -Acute urinary obstruction in setting of BPH and dove: resolved after dove exchanged, urology evaluated the patient and recommend to dc w/ dove. Pt to have outpt follow up with urology.  -Probable complicated urinary tract infection: on abx, initial cbc w/ leukocytosis, urine cx NGTD, blood cx ngtd. ID consulted. And in light of recent C diff infection, to attempt to avoid prolonged abx use pt was given one dose of fosfomycin. -DALLIN due to urinary retention: creat with downward trend  -Recent C diff infection: had been given flagyl and had not completed the course.  ID consultant recommended oral vancomycin which was ordered at the oupt pharmacy at this facility and pt to get this medication prior to discharge. HISTORY OF:  -BPH  -HTN  -Hematuria              Today's examination of the patient revealed:     Subjective:   Pt was evaluated w/ caregiver present at bedside. He appeared comfortable and per caregiver, he was having fair po intake. Objective:   VS:   Visit Vitals  BP (!) 152/78   Pulse 73   Temp 97.6 °F (36.4 °C)   Resp 19   Ht 5' 10\" (1.778 m)   Wt 64.4 kg (142 lb)   SpO2 99%   BMI 20.37 kg/m²      Tmax/24hrs: Temp (24hrs), Av.6 °F (36.4 °C), Min:97.6 °F (36.4 °C), Max:97.6 °F (36.4 °C)     Input/Output: No intake or output data in the 24 hours ending 21 1047  General: somnolent, opens eyes with tactile stimulus   Cardiovascular:  RRR, no murmurs  Pulmonary:  CTAB  GI:  abd soft, nt, nd  Extremities: No edema     Additional:      Labs:    Recent Results (from the past 24 hour(s))   COVID-19 RAPID TEST    Collection Time: 21 12:30 PM   Result Value Ref Range    Specimen source Nasopharyngeal      COVID-19 rapid test Not detected NOTD       Additional Data Reviewed:     Condition on discharge:stable   Disposition:    []Home   [x]Home with Home Health   []SNF/NH   []Rehab   []Home with family   []Alternate Facility:____________________      Discharge Medications:   Cannot display discharge medications since this patient is not currently admitted. Follow-up Appointments:   1. Your PCP: Sosa Cano MD, within 7-10days  2.  Urologist in 1-2 wks        >30 minutes spent coordinating this discharge (review instructions/follow-up, prescriptions, preparing report for sign off)    Signed:  Baron Ayush MD  2021  10:47 AM

## 2021-09-22 NOTE — Clinical Note
Therapy Functional Score Assessment  Question   Score   Grooming  3       Upper Dressing 3      Lower Dressing 3      Bathing  6      Toilet Transfer  4    Transfer  3            Ambulation  5   Dyspnea                     4       Pain Interfering with activity 0  Est number therapy visits      7

## 2021-09-22 NOTE — HOME HEALTH
PT INITIAL EVALUATION    Past Medical Hx:   Allergy      Anxiety states      Arthritis      Benign essential hypertension     Depressive disorder      Diverticulitis      Dizziness and g iddiness      Elevated prostate specific antigen (PSA)      Glaucoma      Gross hematuria      Hypertrophy of prostate with urinary obstruction and other lower urinary tract symptoms (LUTS)      Nocturia      Osteoarthrosis involving multiple sites      Tremor     Recent H/o current illness:  80 year old male presents with MD referral for HHPT s/p hospitalization due to UTI  Medication Management: PCA manage medications  Social hx and home eval:  Pt lives in single family residence with 24 hr caregivers. Caregiver Involvement: patient is dependent with all ADL's and mobility  PLOF:  Pt PLOF is ambulation w FWW, pts base level of function needed some assistance with ADL's  BALANCE:     Seated unsupported balance is fair   Standing static balance is poor  Standing dynamic balance is poor  Patient is at risk for falls due to weakness and recent hospitalization  BLE Strength:  Left Hip flexion 3-/5 , hip abduction 3-/5, hip adduction 3-/5, Knee flexion 3-/5  knee extension 3-/5, ankle dorsiflexion 3-/5  Right Hip flexion 3-/5 , hip abduction 3-/5, hip adduction 3-/5, Knee flexion 3-/5  knee extension 3-/5, ankle dorsiflexion 3-/5  BLE ROM:  Right hip/knee/ankle: WFL  Left hip/knee/ankle: WFL  Bed mobility:  max A   Transfers:  max A with sit<->stand for bed to chair, with SPT. max cues and instruction needed for safety and coordination  GAIT:  Patient unable to ambulate at this time. Pt required max cues for  safety and sequencing  Stairs: NT  Patient education provided this visit: Safety with functional mobility and instruction with HEP. Assessment: Referral for HHPT following recent hospitalization due to UTI.    HHPT is medically necessary to address the following clinical findings: decreased BLE strength and ROM, impaired gait, decreased I and safety with transfers and gait, decreased endurance, decreased balance and decreased safety in order to improve functional mobility and decrease fall risk. Pt is a fall risk as indicated by poor sitting/standing balance. Patient will be seen for HHPT 2w3, 1w1 for therapeutic exercises to increase BLE strength and ROM,  training for gait, stairs and transfers to increase I and safety with daily functional mobility and balance and endurance activities to decrease fall risk, decrease impairment and increase functional mobility and independence in the home. Pt. requires skilled PT intervention to instruct Pt. with gait training with LRAD, ROM and strengthening, stair training, transfer training, balance training, manual therapy, neuromuscular re-education, patient care-giver education, HEP, endurance training, body mechanics.

## 2021-09-23 NOTE — Clinical Note
OT evaluation completed 9.23.21. pt with poor to no participation and minimal communication during evaluation this day. pt with decreased participation and communication as evaluation performed 9.9.21. pt declined participation ADL assessment and declined participation bed mobility/positioning/EOB as well as any other transfers. pt D for bathing, dressing, grooming, and toileting tasks. pt D for bed mobility and positioning as well as EOB, WC, and SPT. pt caregiver verbalized good understanding pressure ulcer prevetion use pillows as well as transfer techniques. pt caregiver demonstrated good transfer technique and safety while performing ADL tasks with pt previous visit 9.9.21. pt caregiver demonstrated good understanding UB HEP. pt B shoulder flex/abd AROM decreased during assessment due to poor participation per pt. pt caregiver reports pt demonstrated B shoulder AROM WFL this am. pt with 3+ to 4-/5 MMT grossly. pt has hired aide care 24 hours per day who provides A all I/ADL tasks and transfers as needed. pt not appropriate for continued OT at this time as he did not participating in evaluation. OT instructed pt/caregiver if pt more wants OT and wanting to participate contact MD for order. pt aide aware contact MD for referral when pt wants to participate in therapy. D/C OT at this time. D/C pt to care of Dr Charles Tarango.

## 2021-09-23 NOTE — Clinical Note
Therapy Functional Score Assessment  Question   Score   Grooming  3       Upper Dressing 3      Lower Dressing 3      Bathing  6      Toilet Transfer  4    Transfer  3            Ambulation  5   Dyspnea                     4       Pain Interfering with activity 0  Est number therapy visits      1

## 2021-09-24 NOTE — HOME HEALTH
Clinical Condition per EPIC:  \"Subjective: Patient is a 80 y.o.male who is being evaluated for bladder outlet obstruction     Mr. Parvez Hernandez is a very pleasant 60-year-old gentleman with a history of anxiety, hypertension, diverticulitis, osteoarthritis, and BPH who had originally had a Du catheter placed on 9/10/2021 due to BPH and outlet obstruction. He has a home health nurse follow-up to follow with him. She noted that he did not have any additional urine output in the bag overnight so 1?-Reportedly brought the patient to the emergency department for further evaluation. He was previously admitted in August of this year for similar symptoms and was treated with [facility] for urinary tract infection. At present time he denies any fevers or chills. He has had a mild decrease in p.o. intake. No other complaints. Called and reason for       Past Medical History:  Diagnosis Date  o Allergy    o Anxiety states    o Arthritis    o Benign essential hypertension    o Depressive disorder    o Diverticulitis    o Dizziness and giddiness    o Elevated prostate specific antigen (PSA)    o Glaucoma    o Gross hematuria    o Hypertrophy of prostate with urinary obstruction and other lower urinary tract symptoms (LUTS)    o Nocturia    o Osteoarthrosis involving multiple sites    o Tremor            Past Surgical History:  Procedure Laterality Date  o HX APPENDECTOMY      o HX HERNIA REPAIR      o HX OTHER SURGICAL        rectal fissure surgery  o HX OTHER SURGICAL Bilateral      eye surgery\"  . Subjective: Pt stated \"No\" in response to OT requesting pt to participate in transfer assessment. pt stated \"No\" in response to pain. pt with no other verbalizations throughout evaluation. pt with no head knods or shaking head throughout evaluation however pt made eye contact when OT addressed him. pt followed commands apporpriately to participate in BUE AROM/MMT assessment however minimal effort with B shoulder AROM testing.  pt with no follow through any other commands throughout evaluation. pt sitting chair upon OT arrival. pt agreed tx however with extremely minimal participation. pt declined ADL and transfer assessment. pt with very minimal response to OT during evaluation however answered aides questions appropriately. OT instructed pt/aide unable to justify continued OT at this time due to pt not wanting to participate in evaluation this day. pt aide verbalized understanding. pt and aide aware OT D/C this day due to pt poor participation. Caregiver involvement: pt has 24 hour care via hired aides who perform all I/ADL tasks and transfers, shopping, transportation, and A with medications. Medications reconciled and all medications are available in the home this visit. The following education was provided regarding medications, medication interactions, and look a like medications: dosages and adverse reactions. Medications are effective at this time. OT reconcilled medications with pt with no changes   Home health supplies by type and quantity ordered/delivered this visit include: NA   Patient education provided this visit: UB HEP, OT role, energy conservation, fall prevention/safety training ADL tasks, transfer training, pressure ulcer prevention  Rehab potential: D/C OT at this time  Home exercise program: Written HEP of the following issued. pt caregiver instructed/demonstrated BUE shoulder press, shoulder flexion, shoulder abduction, shoulder extension, chest press, elbow flexion/extension x 5, x 2 per day. pt caregiver demonstrated I UB HEP. UB HEP for pt to maintain functional endurance and strength to perform/participate in ADL tasks and transfers to perform tasks with safety and for fall prevention.    Continued need for the following skills: Physical Therapy and Nursing   The following discharge planning was discussed with the pt/caregiver: OT evaluation completed 9.23.21. pt with poor to no participation and minimal communication during evaluation this day. pt with decreased participation and communication as evaluation performed 9.9.21. pt declined participation ADL assessment and declined participation bed mobility/positioning/EOB as well as any other transfers. pt D for bathing, dressing, grooming, and toileting tasks. pt D for bed mobility and positioning as well as EOB, WC, and SPT. pt caregiver verbalized good understanding pressure ulcer prevetion use pillows as well as transfer techniques. pt caregiver demonstrated good transfer technique and safety while performing ADL tasks with pt previous visit 9.9.21. pt caregiver demonstrated good understanding UB HEP. pt B shoulder flex/abd AROM decreased during assessment due to poor participation per pt. pt caregiver reports pt demonstrated B shoulder AROM WFL this am. pt with 3+ to 4-/5 MMT grossly. pt has hired aide care 24 hours per day who provides A all I/ADL tasks and transfers as needed. pt not appropriate for continued OT at this time as he did not participating in evaluation. OT instructed pt/caregiver if pt more wants OT and wanting to participate contact MD for order. pt aide aware contact MD for referral when pt wants to participate in therapy. D/C OT at this time. D/C pt to care of Dr Saba Brochure.

## 2021-09-25 NOTE — REMOTE MONITORING
Ivan sent to [facility], [facility] regarding 3 hour Sepsis bundle.   Thank you,    MARGE Kyle RNN, [address]  Callback # [phone]

## 2021-09-25 NOTE — HOSPICE
Hospice referral received. Chart review in process. Thank you for the referral to [address] [place] [address].  If we can be of further assistance please contact [phone]

## 2021-09-25 NOTE — ED TRIAGE NOTES
Per EMS, coming from home. Caretaker states patient noticed the pain last night. Intermittently confused. Chronicly  Here for UTI. Tender in the right hip.  ,

## 2021-09-26 NOTE — CONSULTS
Comprehensive Nutrition Assessment    Type and Reason for Visit: Initial, Consult    Nutrition Recommendations/Plan:   - Continue diet per SLP recommendation  - Add oral supplement to optimize nutrition intake: Ensure Enlive BID  - Assist with meals PRN    Nutrition Assessment:  SLP evaluation noted. Unable to provide intake hx from pt. Caregiver not at bedside at time of visit. Nurse unaware of intake today. Noted feeding assistance recommended by SLP. Malnutrition Assessment:  Malnutrition Status: At risk for malnutrition (specify) (Poor intake PTA and with wt loss (3% x 1 month))      Nutrition History and Allergies: PMH: diverticulitis, HTN, tremor. Presented with hip pain, found to have fever. Concern for UTI. Noted frequent admissions recently. Poor po intake since previous admission per caregiver. Steady weight loss noted since 2014. NKFA. Estimated Daily Nutrient Needs:  Energy (kcal): 9019-7296; Weight Used for Energy Requirements: Current  Protein (g): 77-96; Weight Used for Protein Requirements: Current (1.2-1.5)  Fluid (ml/day): 3632-2862; Method Used for Fluid Requirements: 1 ml/kcal      Nutrition Related Findings:  Last BM unknown. Meds: bio-k plus.       Wounds:    Multiple, Pressure injury, Stage II, Deep tissue injury       Current Nutrition Therapies:  ADULT DIET Dysphagia - Pureed    Anthropometric Measures:  · Height:  5' 10\" (177.8 cm)  · Current Body Wt:  64.4 kg (141 lb 15.6 oz)   · Admission Body Wt:  141 lb 15.6 oz    · Usual Body Wt:  66.7 kg (147 lb) (August 2021)     · Ideal Body Wt:  166 lbs:  85.5 %   · BMI Category:  Underweight (BMI less than 22) age over 72       Nutrition Diagnosis:   · Predicted inadequate energy intake related to acute injury/trauma as evidenced by poor intake prior to admission, weight loss      Nutrition Interventions:   Food and/or Nutrient Delivery: Continue current diet, Start oral nutrition supplement, IV fluid delivery  Nutrition Education and Counseling: Education not indicated, No recommendations at this time  Coordination of Nutrition Care: Continue to monitor while inpatient    Goals:  PO nutrition intake will meet >75% of patient estimated nutritional needs within the next 7 days.        Nutrition Monitoring and Evaluation:   Behavioral-Environmental Outcomes: None identified  Food/Nutrient Intake Outcomes: Diet advancement/tolerance, Food and nutrient intake, Supplement intake, IVF intake  Physical Signs/Symptoms Outcomes: Biochemical data, Chewing or swallowing, Nutrition focused physical findings, Meal time behavior    Discharge Planning:    No discharge needs at this time     Electronically signed by Divya Keenan RD on 9/26/2021 at 12:03 PM    Contact: [phone]

## 2021-09-26 NOTE — PROGRESS NOTES
conducted an initial consultation and Spiritual Assessment for Garima Ruffin, who is a 80 y.o.,male. Patients Primary Language is: [place]. According to the patients EMR [employer] Affiliation is: No Anglican. The reason the Patient came to the hospital is:   Patient Active Problem List    Diagnosis Date Noted    [facility] 09/17/20[facility]    Urinary retention 08/27/2021    UTI (urinary tract infection) 08/27/2021    Sepsis [facility] 08/27/2021    Leukocytosis 08/27/2021    Fever 08/27/2021    Memory loss 11/08/2015    Tremor     Dizziness and giddiness     Benign essential hypertension     Anxiety states     Osteoarthrosis involving multiple sites     Depressive disorder     Hypertrophy of prostate with urinary obstruction and other lower urinary tract symptoms (LUTS)     Elevated prostate specific antigen (PSA)         The  provided the following Interventions:  Initiated a relationship of care and support with patient and friend in room 517 this morning. Patient was not talkative at this point and was covered with blankets up to his neck. Patient does have an advance directive/Medical Power of  on file here. Provided information about Spiritual Care Services. Offered prayer and assurance of continued prayers on patients behalf. Assessment:  Patient does not have any Evangelical/cultural needs that will affect patients preferences in health care. There are no further spiritual or Evangelical issues which require Spiritual Care Services interventions at this time. Plan:  Chaplains will continue to follow and will provide pastoral care on an as needed/requested basis    . Porfirio Mendez   Spiritual Care   [phone]

## 2021-09-26 NOTE — ED PROVIDER NOTES
[address] [facility],[address]  [facility] EMERGENCY DEPT    Date: 9/25/2021  Patient Name: Asa Parsons    History of Presenting Illness     Chief Complaint   Patient presents with    Leg Pain     80 y.o. male with a past medical history of hypertension, BPH, and hematuria presents the ED via EMS for complaints of right leg pain onset yesterday. Patient did not sustain any known injury. Pt is has also reportedly been intermittently confused throughout the day today. Pt had an admission for an DALLIN on 9/17 and suffers from frequent UTI's. He also has a fever here today. Patient does not provide any history. Patient denies any other associated signs or symptoms. Patient denies any other complaints. Nursing notes regarding the HPI and triage nursing notes were reviewed. Prior medical records were reviewed. Current Facility-Administered Medications   Medication Dose Route Frequency Provider Last Rate Last Admin    sodium chloride (NS) flush 5-10 mL  5-10 mL IntraVENous PRN MIRI Sethi        . Please enter patient's Height  and Weight for drug/monitoring purposes. Thank you.   1 Each Other ONCE Sonja Clark MD        [START ON 9/26/2021] cefepime (MAXIPIME) 2 g in sterile water (preservative free) 10 mL IV syringe  2 g IntraVENous Q24H Aracely Boyd PA        [START ON 9/27/2021] levoFLOXacin (LEVAQUIN) 500 mg in D5W IVPB  500 mg IntraVENous Q48H Aracely Ivory PA        [START ON 9/26/2021] finasteride (PROSCAR) tablet 5 mg  5 mg Oral DAILY Lieutenant Blaise MD        [START ON 9/26/2021] L. acidophilus,casei,rhamnosus (BIO-K PLUS) capsule 1 Capsule  1 Capsule Oral DAILY Lieutenant Blaise MD        propranoloL (INDERAL) tablet 10 mg  10 mg Oral BID Lieutenant Blaise MD        [START ON 9/26/2021] tamsulosin (FLOMAX) capsule 0.4 mg  0.4 mg Oral DAILY Lieutenant Blaise MD        vancomycin 50 mg/mL oral solution (compounded) 125 mg  125 mg Oral Q6H Lieutenant Blaise MD        dextrose 5% and 0.9% NaCl infusion  75 mL/hr IntraVENous CONTINUOUS Matty Vazquez MD        albuterol-ipratropium (DUO-NEB) 2.5 MG-0.5 MG/3 ML  3 mL Nebulization Q4H RT Matty Vazquez MD        acetaminophen (TYLENOL) suppository 325 mg  325 mg Rectal Q6H PRN Matty Vazquez MD        ondansetron [facility]) injection 4 mg  4 mg IntraVENous Q6H PRN Matty Vazquez MD        pantoprazole (PROTONIX) 40 mg in 0.9% sodium chloride 10 mL injection  40 mg IntraVENous Q24H Matty Vazquez MD        heparin (porcine) injection 5,000 Units  5,000 Units SubCUTAneous Q8H Matty Vazquez MD        morphine injection 1 mg  1 mg IntraVENous Q4H PRN Matty Vazquez MD         Current Outpatient Medications   Medication Sig Dispense Refill    vancomycin 50 mg/mL oral solution (compounded) Take 2.5 mL by mouth every six (6) hours for 10 days. 100 mL 0    L. acidophilus,casei,rhamnosus (BIO-K PLUS) 50 billion cell cpDR capsule Take 1 Capsule by mouth daily for 17 days. 17 Capsule 0    finasteride (PROSCAR) 5 mg tablet Take 1 Tablet by mouth daily. 30 Tablet 0    tamsulosin (FLOMAX) 0.4 mg capsule Take 1 Capsule by mouth daily. 30 Capsule 0    propranoloL (INDERAL) 10 mg tablet take 1 tablet by mouth twice a day 180 Tab 3    pantoprazole (PROTONIX) 40 mg tablet take 1 tablet by mouth once daily (Patient taking differently: Take 40 mg by mouth ACB/HS.  take 1 tablet by mouth once daily) 120 Tab 3       Past History     Past Medical History:  Past Medical History:   Diagnosis Date    Allergy     Anxiety states     Arthritis     Benign essential hypertension     Depressive disorder     Diverticulitis     Dizziness and giddiness     Elevated prostate specific antigen (PSA)     Glaucoma     Gross hematuria     Hypertrophy of prostate with urinary obstruction and other lower urinary tract symptoms (LUTS)     Nocturia     Osteoarthrosis involving multiple sites     Tremor        Past Surgical History:  Past Surgical History:   Procedure Laterality Date    HX APPENDECTOMY      HX HERNIA REPAIR      HX OTHER SURGICAL      rectal fissure surgery    HX OTHER SURGICAL Bilateral     eye surgery       Family History:  Family History   Problem Relation Age of Onset    No Known Problems Mother     No Known Problems Father     Hypertension Maternal Grandfather     Stroke Maternal Grandfather        Social History:  Social History     Tobacco Use    Smoking status: Former Smoker    Smokeless tobacco: Never Used   Substance Use Topics    Alcohol use: Yes     Comment: occasional    Drug use: Not on file       Allergies: Allergies   Allergen Reactions    Sulfa (Sulfonamide Antibiotics) Other (comments)     Dizziness/ passed out       Patient's primary care provider (as noted in EPIC): Ping Esparza MD    Review of Systems   Constitutional:  Denies malaise, fever, chills. Head:  Denies injury. Face:  Denies injury or pain. ENMT:  Denies sore throat. Neck:  Denies injury or pain. Chest:  Denies injury. Cardiac:  Denies chest pain or palpitations. Respiratory:  Denies cough, wheezing, difficulty breathing, shortness of breath. GI/ABD:  Denies injury, pain, distention, nausea, vomiting, diarrhea. :  Denies injury, pain, dysuria or urgency. Back:  Denies injury or pain. Pelvis:  Denies injury or pain. Extremity/MS:  Denies injury or pain. Neuro:  Denies headache, LOC, dizziness, neurologic symptoms/deficits/paresthesias. Skin: Denies injury, rash, itching or skin changes. All other systems negative as reviewed. Visit Vitals  /67   Pulse 99   Temp (!) 100.7 °F (38.2 °C)   Resp 16   SpO2 97%       PHYSICAL EXAM:    CONSTITUTIONAL:  Alert, ill appearing; frail. HEAD:  Normocephalic, atraumatic. EYES:  EOMI. Non-icteric sclera. Normal conjunctiva. ENTM:  Nose:  no rhinorrhea.   Throat:  no erythema or exudate, mucous membranes moist.  NECK: Supple  RESPIRATORY:  Chest clear, equal breath sounds, good air movement. Without wheezes, rhonchi or rales. CARDIOVASCULAR:  Regular rate and rhythm. No murmurs, rubs, or gallops. GI:  Normal bowel sounds, abdomen soft and non-tender. No rebound or guarding. : Du catheter present. UPPER EXT:  Normal inspection. LOWER EXT:  Pt screams with rotation of right hip; 2+ distal pulses. NEURO:  Moves all four extremities, and grossly normal motor exam.  SKIN:  No rashes;  Normal for age. PSYCH:  Alert and normal affect.     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)  Abnormal ECG  When compared with ECG of 10-SEP-2021 20:58,  Questionable change in QRS duration  Borderline criteria for Lateral infarct are now present     POC LACTIC ACID    Collection Time: 09/25/21  3:39 PM   Result Value Ref Range    Lactic Acid (POC) 1.56 0.40 - 8.29 mmol/L   METABOLIC PANEL, COMPREHENSIVE    Collection Time: 09/25/21  3:40 PM   Result Value Ref Range    Sodium 142 136 - 145 mmol/L    Potassium 5.0 3.5 - 5.5 mmol/L    Chloride 107 100 - 111 mmol/L    CO2 27 21 - 32 mmol/L    Anion gap 8 3.0 - 18 mmol/L    Glucose 91 74 - 99 mg/dL    BUN 53 (H) 7.0 - 18 MG/DL    Creatinine 2.49 (H) 0.6 - 1.3 MG/DL    BUN/Creatinine ratio 21 (H) 12 - 20      GFR est AA 29 (L) >60 ml/min/1.73m2    GFR est non-AA 24 (L) >60 ml/min/1.73m2    Calcium 8.4 (L) 8.5 - 10.1 MG/DL    Bilirubin, total 0.6 0.2 - 1.0 MG/DL    ALT (SGPT) 64 (H) 16 - 61 U/L    AST (SGOT) 111 (H) 10 - 38 U/L    Alk. phosphatase 95 45 - 117 U/L    Protein, total 8.6 (H) 6.4 - 8.2 g/dL    Albumin 2.2 (L) 3.4 - 5.0 g/dL    Globulin 6.4 (H) 2.0 - 4.0 g/dL    A-G Ratio 0.3 (L) 0.8 - 1.7     CBC WITH AUTOMATED DIFF    Collection Time: 09/25/21  3:40 PM   Result Value Ref Range    WBC 9.7 4.6 - 13.2 K/uL    RBC 3.33 (L) 4.35 - 5.65 M/uL    HGB 9.6 (L) 13.0 - 16.0 g/dL    HCT 30.5 (L) 36.0 - 48.0 %    MCV 91.6 78.0 - 100.0 FL    MCH 28.8 24.0 - 34.0 PG    MCHC 31.5 31.0 - 37.0 g/dL    RDW 14.5 11.6 - 14.5 %    PLATELET 992 211 - 149 K/uL    MPV 10.2 9.2 - 11.8 FL    NEUTROPHILS 78 (H) 40 - 73 %    LYMPHOCYTES 12 (L) 21 - 52 %    MONOCYTES 8 3 - 10 %    EOSINOPHILS 1 0 - 5 %    BASOPHILS 1 0 - 2 %    ABS. NEUTROPHILS 7.5 1.8 - 8.0 K/UL    ABS. LYMPHOCYTES 1.2 0.9 - 3.6 K/UL    ABS. MONOCYTES 0.8 0.05 - 1.2 K/UL    ABS. EOSINOPHILS 0.1 0.0 - 0.4 K/UL    ABS. BASOPHILS 0.1 0.0 - 0.1 K/UL    DF AUTOMATED     COVID-19 RAPID TEST    Collection Time: 09/25/21  3:45 PM   Result Value Ref Range    Specimen source Nasopharyngeal      COVID-19 rapid test Not detected NOTD     URINALYSIS W/ RFLX MICROSCOPIC    Collection Time: 09/25/21  5:25 PM   Result Value Ref Range    Color YELLOW      Appearance TURBID      Specific gravity 1.015 1.005 - 1.030      pH (UA) 5.5 5.0 - 8.0      Protein 30 (A) NEG mg/dL    Glucose Negative NEG mg/dL    Ketone TRACE (A) NEG mg/dL    Bilirubin Negative NEG      Blood MODERATE (A) NEG      Urobilinogen 0.2 0.2 - 1.0 EU/dL    Nitrites Negative NEG      Leukocyte Esterase LARGE (A) NEG     URINE MICROSCOPIC ONLY    Collection Time: 09/25/21  5:25 PM   Result Value Ref Range    WBC TOO NUMEROUS TO COUNT 0 - 4 /hpf    RBC 5 to 9 0 - 5 /hpf    Epithelial cells 3+ 0 - 5 /lpf    Bacteria FEW (A) NEG /hpf    Yeast 4+ (A) NEG      XR HIP RT W OR WO PELV 2-3 VWS    Result Date: 9/25/2021  Hip series. CPT code: 82202 Clinical history:  With tenderness in the right hip.  Technique: Two views of the hip. Comparison: No priors Findings: Right hip is flexed for the exam. There are no fractures. Normal alignment is seen at the hip. Bones are normally mineralized. There are mild hypertrophic changes right hip. Pelvis is unremarkable. No evidence for hip fracture. XR CHEST PORT    Result Date: 9/25/2021  INDICATION:  See indication above. COMPARISON:  Recent prior FINDINGS: A portable AP radiograph of the chest was obtained at 1501 hours. Hypoinflation exaggerating bronchovascular markings. There is silhouetting of the left hemidiaphragm, potentially exaggerated by rotation. Underlying effusion and/or atelectasis/infiltrate cannot be excluded. Right lung is clear. Osseous structures are stable. Limited study secondary to suboptimal positioning. Retrocardiac opacity cannot be excluded as above. IMPRESSION AND MEDICAL DECISION MAKING:  Based upon the patient's presentation with noted HPI and PE, along with the work up done in the emergency department, I believe that the patient is having an urinary tract infection. Given levaquin and cefepime. Lactate and WBC count WNL. Consult to Dr. Madeleine Greenberg, who will accept the patient for admission and states he will place the bed order. Diagnosis:   1. Urinary tract infection without hematuria, site unspecified    2. DALLIN (acute kidney injury) ([facility] [address]      Disposition: Admission     Patient's Medications   Start Taking    No medications on file   Continue Taking    FINASTERIDE (PROSCAR) 5 MG TABLET    Take 1 Tablet by mouth daily. L. ACIDOPHILUS,CASEI,RHAMNOSUS (BIO-K PLUS) 50 BILLION CELL CPDR CAPSULE    Take 1 Capsule by mouth daily for 17 days. PANTOPRAZOLE (PROTONIX) 40 MG TABLET    take 1 tablet by mouth once daily    PROPRANOLOL (INDERAL) 10 MG TABLET    take 1 tablet by mouth twice a day    TAMSULOSIN (FLOMAX) 0.4 MG CAPSULE    Take 1 Capsule by mouth daily.     VANCOMYCIN 50 MG/ML ORAL SOLUTION (COMPOUNDED)    Take 2.5 mL by mouth every six (6) hours for 10 days.    These Medications have changed    No medications on file   Stop Taking    No medications on file     MIRI Almeida

## 2021-09-26 NOTE — ROUTINE PROCESS
End of Shift Note     Bedside and verbal shift change report given to Shanda Go RN (On coming nurse) by Be Barakat RN (Off going nurse).   Report included the following information:      --Procedure Summary     --MAR,     --Recent Results     --Med Rec Status    SBAR Recommendations: Complete care    Issues for Provider to address hospice          Activity This Shift     [x] Bed Rest Order   [] Refused   [] Dangled    [] TDWB         Ambulating:     [] Bathroom     [] BSC     [] Room/Hallway      Up in Chair for meals    []Yes [] No   Voiding       [] Yes  [] No  Du          [x] Yes  [] No  Incontinent [] Yes  [] No    DUE TO VOID POUR        [] Yes [] No  Purewick    [] Yes [] No  New Onset [] Yes [] No Straight Cath   []Yes  [] No  Condom Cath  [] Yes [] No  MD Called      [] Yes  [] No   Blood Sugars Managed []Yes [x] No    Bowels Moved [] Yes [x] No    Incontinent     [] Yes [] No Passed Gas []Yes [x] No    New Onset  []Yes [] No        MD Called []Yes  [] No     CHG Bath Done     Before Surgery     After Surgery      [] Yes  [x] No  [] Yes  [x] No       Drain Removed [] Yes  [] No [x] N/A    Dressing Changed [] Yes   [] No [x] N/A      Nausea/Vomiting [] Yes   [x] No     Ice Packs Changed [] Yes   [] No  [x] N/A    Incentive Spirometer  [] Yes  [x] No      SCD Pumps On     Ankle Pumping  [] Yes   [x] No      [] Yes   [x] No        Telemetry Monitoring [] Yes   [x] No   Rhythm

## 2021-09-26 NOTE — ROUTINE PROCESS
Bedside and Verbal shift change report given to JamesRN,RN (oncoming nurse) by Melia Gatica (offgoing nurse). Report included the following information SBAR, Kardex, Intake/Output, MAR and Recent Results.

## 2021-09-26 NOTE — PROGRESS NOTES
Hospitalist Progress Note    Patient: Emanuel Estrada Age: 80 y.o. : 1922 MR#: [id number] SSN: [id number]  Date/Time: 2021 11:26 AM    DOA: 2021  PCP: Pierre Perera MD    Subjective: Wife at bedside. Patient's caretaker remains at bedside. He remains with minimal vocalization, expressed that he is OK. Apparently, caretaker noted that his in generalized pain, more in his right hip. XRAY of right hip without fracture  He has been started on IV medications for UTI  He remains on Oral vancomycin for recent C. Diff diagnosed   Hospice consulted       Interval Hospital Course:        ROS: limited but no pain complaint, no headache, no stomach pain, no chest pain      Assessment/Plan:   1. Hip pain, this is likely musculoskeletal, no fracture on hip xray        Likely osteoarthritis   2. Acute cystitis with hematuria, associate with dove catheter usage   3. DALLIN on CKD 3  4. Chronic urinary retention with chronic dove cath   5. Recent C. Diff diarrhea   6. Failure to thrive with severe dysphagia   7. Age-related cognitive impairment   8. Debility and fall risk   9.    Normocytic anemia     Spoke with wife and caretaker about transition to hospice/comfort measure   Hospice to consult and follow up   Caretakers asked if his pain and anxiety can be managed  Continue IV antibiotic, IV fluid for now  Family to make decision on transition to home with hospice tomorrow   Add morphine and ativan for anxiety     DNR      Additional Notes:    Time spent >35 minutes    Case discussed with:  [x]Patient  [x]Family  [x]Nursing  []Case Management  DVT Prophylaxis:  []Lovenox  [x]Hep SQ  []SCDs  []Coumadin   []On Heparin gtt    Signed By: Mike Brizuela MD     2021 11:26 AM              Objective:   VS:   Visit Vitals  /62   Pulse (!) 106   Temp 99.3 °F (37.4 °C)   Resp 18   Ht 5' 10\" (1.778 m) Comment: as of 21   Wt 64.4 kg (141 lb 15.6 oz) Comment: as of 21   SpO2 97%   BMI 20.37 kg/m²      Tmax/24hrs: Temp (24hrs), Av °F (37.2 °C), Min:97.6 °F (36.4 °C), Max:100.9 °F (38.3 °C)      Intake/Output Summary (Last 24 hours) at 2021 1126  Last data filed at 2021 5589  Gross per 24 hour   Intake 368.75 ml   Output    Net 368.75 ml       Tele:   General:  Cooperative, Not in acute distress, frail  HEENT: PERRL, EOMI, supple neck, no JVD, dry oral mucosa  Cardiovascular: S1S2 regular, no rub/gallop   Pulmonary: air entry bilaterally, no wheezing, no crackle  GI:  Soft, non tender, non distended, +bs, no guarding   Extremities:  No pedal edema, +distal pulses appreciated   Neuro: Awake to commends and queries    Additional:       Current Facility-Administered Medications   Medication Dose Route Frequency    heparin (porcine) injection 5,000 Units  5,000 Units SubCUTAneous Q8H    sodium chloride (NS) flush 5-10 mL  5-10 mL IntraVENous PRN    cefepime (MAXIPIME) 2 g in sterile water (preservative free) 10 mL IV syringe  2 g IntraVENous Q24H    [START ON 2021] levoFLOXacin (LEVAQUIN) 500 mg in D5W IVPB  500 mg IntraVENous Q48H    finasteride (PROSCAR) tablet 5 mg  5 mg Oral DAILY    L. acidophilus,casei,rhamnosus (BIO-K PLUS) capsule 1 Capsule  1 Capsule Oral DAILY    propranoloL (INDERAL) tablet 10 mg  10 mg Oral BID    tamsulosin (FLOMAX) capsule 0.4 mg  0.4 mg Oral DAILY    vancomycin 50 mg/mL oral solution (compounded) 125 mg  125 mg Oral Q6H    dextrose 5% and 0.9% NaCl infusion  75 mL/hr IntraVENous CONTINUOUS    albuterol-ipratropium (DUO-NEB) 2.5 MG-0.5 MG/3 ML  3 mL Nebulization Q4H RT    acetaminophen (TYLENOL) suppository 325 mg  325 mg Rectal Q6H PRN    ondansetron (ZOFRAN) injection 4 mg  4 mg IntraVENous Q6H PRN    pantoprazole (PROTONIX) 40 mg in 0.9% sodium chloride 10 mL injection  40 mg IntraVENous Q24H    morphine injection 1 mg  1 mg IntraVENous Q4H PRN            Lab/Data Review:  Labs: Results:       Chemistry Recent Labs     21  0320 09/25/21  1540   * 91    142   K 4.6 5.0    107   CO2 26 27   BUN 56* 53*   CREA 2.32* 2.49*   BUCR 24* 21*   AGAP 7 8   CA 7.8* 8.4*     Recent Labs     09/25/21  1540   ALT 64*   TP 8.6*   ALB 2.2*   GLOB 6.4*   AGRAT 0.3*      CBC w/Diff Recent Labs     09/26/21  0327 09/25/21  1540 09/25/21  1540   WBC 9.3  --  9.7   RBC 2.87*  --  3.33*   HGB 8.4*  --  9.6*   HCT 26.8*  --  30.5*   MCV 93.4   < > 91.6   MCH 29.3   < > 28.8   MCHC 31.3   < > 31.5   RDW 14.6*   < > 14.5     --  400   GRANS 78*  --  78*   LYMPH 12*  --  12*   EOS 1  --  1    < > = values in this interval not displayed. Coagulation No results for input(s): PTP, INR, APTT, INREXT in the last 72 hours.     Iron/Ferritin Lab Results   Component Value Date/Time    Iron 37 (L) 09/29/2014 05:53 PM    TIBC 241 (L) 09/29/2014 05:53 PM    Iron % saturation 15 09/29/2014 05:53 PM       BNP    Cardiac Enzymes Lab Results   Component Value Date/Time     09/17/2021 10:00 AM    CK - MB 2.0 09/17/2021 10:00 AM    CK-MB Index 1.5 09/17/2021 10:00 AM    Troponin-I, QT 0.07 (H) 09/17/2021 10:00 AM        Lactic Acid    Thyroid Studies          All Micro Results     Procedure Component Value Units Date/Time    CULTURE, BLOOD [[id number]]     Order Status: Sent Specimen: Blood     CULTURE, URINE [[id number]] Collected: 09/25/21 1725    Order Status: Completed Specimen: Cath Urine Updated: 09/26/21 1043    CULTURE, BLOOD [[id number]] Collected: 09/25/21 1534    Order Status: Completed Specimen: Blood Updated: 09/26/21 1024     Special Requests: NO SPECIAL REQUESTS        Culture result: NO GROWTH AFTER 17 HOURS       CULTURE, BLOOD [[id number]] Collected: 09/25/21 1540    Order Status: Completed Specimen: Blood Updated: 09/26/21 0902     Special Requests: LEFT        GRAM STAIN       AEROBIC BOTTLE GRAM POSITIVE COCCI IN GROUPS                  CALLED TO AND CORRECTLY REPEATED BY: HANNARN 5S AT 0900 [id number] TO [facility]           Culture result:       CULTURE IN PROGRESS,FURTHER UPDATES TO FOLLOW                  Sent to [facility] for ID/Susceptibility if indicated. COVID-19 RAPID TEST [[id number]]     Order Status: Canceled     COVID-19 RAPID TEST [[id number]] Collected: 09/25/21 1545    Order Status: Completed Specimen: Nasopharyngeal Updated: 09/25/21 1657     Specimen source Nasopharyngeal        COVID-19 rapid test Not detected        Comment: Rapid [employer] ID Now       Rapid NAAT:  The specimen is NEGATIVE for SARS-CoV-2, the novel coronavirus associated with COVID-19. Negative results should be treated as presumptive and, if inconsistent with clinical signs and symptoms or necessary for patient management, should be tested with an alternative molecular assay. Negative results do not preclude SARS-CoV-2 infection and should not be used as the sole basis for patient management decisions. This test has been authorized by the FDA under an Emergency Use Authorization (EUA) for use by authorized laboratories. Fact sheet for Healthcare Providers: [employer]  Fact sheet for Patients: [employer]       Methodology: Isothermal Nucleic Acid Amplification                 Images:    CT (Most Recent). XRAY (Most Recent) XR Results (most recent):  Results from Hospital Encounter encounter on 09/25/21    XR HIP RT W OR WO PELV 2-3 VWS    Narrative  Hip series. CPT code: 01404    Clinical history:  With tenderness in the right hip. Technique: Two views of the hip. Comparison: No priors    Findings: Right hip is flexed for the exam. There are no fractures. Normal  alignment is seen at the hip. Bones are normally mineralized. There are mild  hypertrophic changes right hip. Pelvis is unremarkable. Impression  No evidence for hip fracture. EKG No results found for this or any previous visit.      2D ECHO

## 2021-09-26 NOTE — H&P
History and Physical    Chief complaint: Patient initially presented to the emergency room for management and evaluation of right hip pain. However, he was found out to have fever and abnormal urinalysis. HPI:     Severo Gavel is a 80 y.o.  male who presented to emergency room earlier today with complaint of hip pain. Patient has generalized body ache but more towards right hip. No fall per caregiver at bedside. Patient says he is fine and he just wants to go away he is tired. Patient has not been eating well per caregiver since he left the hospital.  He has a Du catheter. I spoke to patient's medical power of  over the phone initially: She told me that home health care nurse thought he has been hurting due to fracture that is why they sent him here. I have told her that he has been getting this recurrent UTI because of his not eating well and staying in bed and having indwelling Du catheter and he should be comfortable/on hospice care. Initially she was in agreement with it. However, ED PA call her back to make sure that he comes home with antibiotic by mouth. At this point, she change her mind and stated she would like him to be treated for UTI with IV antibiotic. Patient cannot provide any meaningful history at this point. Patient has multiple admissions in last 5 weeks for similar condition.     Past Medical History:   Diagnosis Date    Allergy     Anxiety states     Arthritis     Benign essential hypertension     Depressive disorder     Diverticulitis     Dizziness and giddiness     Elevated prostate specific antigen (PSA)     Glaucoma     Gross hematuria     Hypertrophy of prostate with urinary obstruction and other lower urinary tract symptoms (LUTS)     Nocturia     Osteoarthrosis involving multiple sites     Tremor       Past Surgical History:   Procedure Laterality Date    HX APPENDECTOMY      HX HERNIA REPAIR      HX OTHER SURGICAL      rectal fissure surgery    HX OTHER SURGICAL Bilateral     eye surgery     Family History   Problem Relation Age of Onset    No Known Problems Mother     No Known Problems Father     Hypertension Maternal Grandfather     Stroke Maternal Grandfather       Social History     Tobacco Use    Smoking status: Former Smoker    Smokeless tobacco: Never Used   Substance Use Topics    Alcohol use: Yes     Comment: occasional       Prior to Admission medications    Medication Sig Start Date End Date Taking? Authorizing Provider   vancomycin 50 mg/mL oral solution (compounded) Take 2.5 mL by mouth every six (6) hours for 10 days. 9/20/21 9/30/21  Ceci Choudhary MD   L. acidophilus,casei,rhamnosus (BIO-K PLUS) 50 billion cell cpDR capsule Take 1 Capsule by mouth daily for 17 days. 9/21/21 10/8/21  Ceci Choudhary MD   finasteride (PROSCAR) 5 mg tablet Take 1 Tablet by mouth daily. 8/31/21   Светлана Jaime MD   tamsulosin (FLOMAX) 0.4 mg capsule Take 1 Capsule by mouth daily. 8/31/21   Светлана Jaime MD   propranoloL (INDERAL) 10 mg tablet take 1 tablet by mouth twice a day 4/17/21   Yecenia Wynne MD   pantoprazole (PROTONIX) 40 mg tablet take 1 tablet by mouth once daily  Patient taking differently: Take 40 mg by mouth ACB/HS. take 1 tablet by mouth once daily 4/17/21   Jose Roberto Arellano MD     Allergies   Allergen Reactions    Sulfa (Sulfonamide Antibiotics) Other (comments)     Dizziness/ passed out        Review of Systems:  Review of systems not obtained due to patient factors. Age-related dementia    Objective: Intake and Output:    No intake/output data recorded. No intake/output data recorded. Physical Exam:     /67   Pulse 99   Temp (!) 100.7 °F (38.2 °C)   Resp 16   SpO2 97%     General appearance -awake, sitting in fetal position, cachectic, not in acute distress, able to answer some questions. Eyes - sclera anicteric, no pallor  Nose - no obvious nasal discharge.    Neck - supple, no JVD, trachea is midline  Chest -diminished air entry noted in bases, no wheezes  Heart - S1 and S2 normal  Abdomen - soft, nontender, nondistended, Bowel sounds present, Du catheter in situ  Neurological -awake, patient is in fetal position, able to move upper extremities sometimes. Musculoskeletal - no joint tenderness or swelling of knees/hips bilaterally  Extremities - no pedal edema noted    Data Review:   Recent Results (from the past 24 hour(s))   EKG, 12 LEAD, INITIAL    Collection Time: 09/25/21  2:59 PM   Result Value Ref Range    Ventricular Rate 96 BPM    Atrial Rate 96 BPM    P-R Interval 162 ms    QRS Duration 84 ms    Q-T Interval 374 ms    QTC Calculation (Bezet) 472 ms    Calculated P Axis 65 degrees    Calculated R Axis -46 degrees    Calculated T Axis 81 degrees    Diagnosis       Normal sinus rhythm with sinus arrhythmia  Left axis deviation  Possible Lateral infarct , age undetermined  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)  Abnormal ECG  When compared with ECG of 10-SEP-2021 20:58,  Questionable change in QRS duration  Borderline criteria for Lateral infarct are now present     POC LACTIC ACID    Collection Time: 09/25/21  3:39 PM   Result Value Ref Range    Lactic Acid (POC) 1.56 0.40 - 7.25 mmol/L   METABOLIC PANEL, COMPREHENSIVE    Collection Time: 09/25/21  3:40 PM   Result Value Ref Range    Sodium 142 136 - 145 mmol/L    Potassium 5.0 3.5 - 5.5 mmol/L    Chloride 107 100 - 111 mmol/L    CO2 27 21 - 32 mmol/L    Anion gap 8 3.0 - 18 mmol/L    Glucose 91 74 - 99 mg/dL    BUN 53 (H) 7.0 - 18 MG/DL    Creatinine 2.49 (H) 0.6 - 1.3 MG/DL    BUN/Creatinine ratio 21 (H) 12 - 20      GFR est AA 29 (L) >60 ml/min/1.73m2    GFR est non-AA 24 (L) >60 ml/min/1.73m2    Calcium 8.4 (L) 8.5 - 10.1 MG/DL    Bilirubin, total 0.6 0.2 - 1.0 MG/DL    ALT (SGPT) 64 (H) 16 - 61 U/L    AST (SGOT) 111 (H) 10 - 38 U/L    Alk.  phosphatase 95 45 - 117 U/L    Protein, total 8.6 (H) 6.4 - 8.2 g/dL Albumin 2.2 (L) 3.4 - 5.0 g/dL    Globulin 6.4 (H) 2.0 - 4.0 g/dL    A-G Ratio 0.3 (L) 0.8 - 1.7     CBC WITH AUTOMATED DIFF    Collection Time: 09/25/21  3:40 PM   Result Value Ref Range    WBC 9.7 4.6 - 13.2 K/uL    RBC 3.33 (L) 4.35 - 5.65 M/uL    HGB 9.6 (L) 13.0 - 16.0 g/dL    HCT 30.5 (L) 36.0 - 48.0 %    MCV 91.6 78.0 - 100.0 FL    MCH 28.8 24.0 - 34.0 PG    MCHC 31.5 31.0 - 37.0 g/dL    RDW 14.5 11.6 - 14.5 %    PLATELET 035 731 - 248 K/uL    MPV 10.2 9.2 - 11.8 FL    NEUTROPHILS 78 (H) 40 - 73 %    LYMPHOCYTES 12 (L) 21 - 52 %    MONOCYTES 8 3 - 10 %    EOSINOPHILS 1 0 - 5 %    BASOPHILS 1 0 - 2 %    ABS. NEUTROPHILS 7.5 1.8 - 8.0 K/UL    ABS. LYMPHOCYTES 1.2 0.9 - 3.6 K/UL    ABS. MONOCYTES 0.8 0.05 - 1.2 K/UL    ABS. EOSINOPHILS 0.1 0.0 - 0.4 K/UL    ABS. BASOPHILS 0.1 0.0 - 0.1 K/UL    DF AUTOMATED     COVID-19 RAPID TEST    Collection Time: 09/25/21  3:45 PM   Result Value Ref Range    Specimen source Nasopharyngeal      COVID-19 rapid test Not detected NOTD     URINALYSIS W/ RFLX MICROSCOPIC    Collection Time: 09/25/21  5:25 PM   Result Value Ref Range    Color YELLOW      Appearance TURBID      Specific gravity 1.015 1.005 - 1.030      pH (UA) 5.5 5.0 - 8.0      Protein 30 (A) NEG mg/dL    Glucose Negative NEG mg/dL    Ketone TRACE (A) NEG mg/dL    Bilirubin Negative NEG      Blood MODERATE (A) NEG      Urobilinogen 0.2 0.2 - 1.0 EU/dL    Nitrites Negative NEG      Leukocyte Esterase LARGE (A) NEG     URINE MICROSCOPIC ONLY    Collection Time: 09/25/21  5:25 PM   Result Value Ref Range    WBC TOO NUMEROUS TO COUNT 0 - 4 /hpf    RBC 5 to 9 0 - 5 /hpf    Epithelial cells 3+ 0 - 5 /lpf    Bacteria FEW (A) NEG /hpf    Yeast 4+ (A) NEG       CXR Results  (Last 48 hours)               09/25/21 1520  XR CHEST PORT Final result    Impression:  Limited study secondary to suboptimal positioning. Retrocardiac   opacity cannot be excluded as above. Narrative:  INDICATION:  See indication above. COMPARISON:  Recent prior       FINDINGS: A portable AP radiograph of the chest was obtained at 1501 hours. Hypoinflation exaggerating bronchovascular markings. There is silhouetting of   the left hemidiaphragm, potentially exaggerated by rotation. Underlying effusion   and/or atelectasis/infiltrate cannot be excluded. Right lung is clear. Osseous   structures are stable. Assessment:     Active Problems:    UTI (urinary tract infection) (8/27/2021)      1. Hip pain, unknown etiology with pending x-ray report currently. 2.  Low-grade fever, negative for Covid  3. Abnormal urinalysis could be due to recurrent UTI  4. Recent C. difficile colitis  5. Failure to thrive and severe dysphagia  6. Age-related cognitive impairment  7. Worsening chronic debility  8. Chronic urinary retention status post Du  9. Acute on stage III chronic kidney disease due to dehydration secondary to failure to thrive  10. Osteoarthritis involving multiple sites  Plan:     Patient will be admitted for observation until x-ray report is back and urine culture report is back. Patient is high risk for recurrent UTI due to his bedbound status and failure to thrive. We will continue Du catheter and Proscar with Flomax. We will continue vancomycin until September 30, 2021 to complete the course of antibiotic as recommended by ID for #4. Continue probiotic. Speech therapist to follow. We will start patient on IV fluid and monitor renal function. Patient will be placed on morphine as needed for pain management. Patient is really suffering from overall conditions and there is no real hope of clinical recovery. Palliative care and hospice will be consulted. I tried to talk to medical power of  and she understood it first time but second time she did not want to talk to me over the phone.   We will admit him so we can talk to her in person tomorrow to discuss the goals of care as she needs to understand that patient is really deteriorating clinically and we should fulfill his wish to be as comfortable as possible. Patient will be continued with DNR status. Total time to take care of this patient was 65 minutes and more than 50% of time was spent counseling and coordinating care. Disclaimer: Sections of this note are dictated using utilizing voice recognition software, which may have resulted in some phonetic based errors in grammar and contents. Even though attempts were made to correct all the mistakes, some may have been missed, and remained in the body of the document. If questions arise, please contact our department.

## 2021-09-26 NOTE — HOME HEALTH
Summary of clinical health condition: 81 y/o male rehospitalized dx: UTI/Sepsis. Medications reconciled  and all medications are available at home. The following education was provided regarding medications, medication interactions, and look a like medications: N/A, all meds reviewed. Discussed importance of timely taking all prescribed meds, proper dosage and freq. Discussed to administer abx Vanco 2.5 ml on timely basis as prescribed, discussed S/E; increased loose stools watery or bloody, ringing ears, or hearing loss. Medications are somewhat effective at this time. Caregiver: CG/PCA assist with ADL's, prepare daily meals, administer with daily medications, run errands, groceries and accompany to MD appt.prn.     Skilled care provided: Teaching disease and medication management, completed assessment, Du cath care teaching, discussed MICHELLE service, POC, SNV and D/C plan. Patient education provided this visit to include: Discussed intervention to prevent infection; hand washing, wearing mask during clinician's visit, when going to public places and avoiding sick person. Reviewed Du cath care; how to cleanse juan david-ely area including how to cleanse Du cath and emptying bag when half full. Repositioning q2 hrs and propping bony prominences with pillow. Setting 45 degrees during meals and avoid feeding when letahrgic. Safety and fall prec; placing walker in easy access, avoid getting up too quickly when feeling weak, dizzy when transferring to [facility]. Avoid skipping meals, sm freq meals with less appetite, and good hydration. Providing Ensure with less appetite. Reviewed S/S of infection; fever 101.3, increase pain, redness, swelling,  coughing with yellow thick sputum, cloudy urine with foul smell, not feeling well 2-3 days, SOB, and to call HCFA or MD for assistance if experiencing any of these S/S.  To call 911 with chest pains, facial drooping, difficulty talking, non arousal/unconscious and uncontrollable bleeding. (Teaching provided to CG/PCA and Mariah  Patient/caregiver degree of understanding: good   Home health supplies by type and quantity ordered/delivered this visit include: N/A   Continued need for the following skills: Nursing, Physical Therapy Pt./CG instructed on plan of care,  PT, OT to continue, SN freq visit; 1d1, 2w1,1w3,  2 prn and D/C plan. Plan of care and MICHELLE orders called to attending physician: Darian Balderrama MD informed MICHELLE sevice started 9/22/21   Home exercise program/Homework provided: Ambulation, HEP deep breathing exercises 10x when having SOB, pain and anxiety. Discharge planning discussed with patient and caregiver. Discharge planning as follows: Pt/CG will be able to manage disease and medication independently, and health condition stable. Inda Brittle Pt/Caregiver did verbalize understanding of discharge planning. Patient/caregiver encouraged/instructed to keep appointment as lack of follow through with physician appointment could result in discontinuation of home care services for non-compliance. COVID - 23 Screening completed before visit:      Denies and no family member  has any of these S/S:   Fever, dry cough, sore throat diarrhea, body aches, not feeling well and loss of taste.

## 2021-09-26 NOTE — PROGRESS NOTES
Problem: Dysphagia (Adult)  Goal: *Acute Goals and Plan of Care (Insert Text)  Description: Patient will:  1. Tolerate PO trials with 0 s/s overt distress in 4/5 trials  2. Utilize compensatory swallow strategies/maneuvers (decrease bite/sip, size/rate, alt. liq/sol) with min cues in 4/5 trials  3. Perform oral-motor/laryngeal exercises to increase oropharyngeal swallow function with min cues  4. Complete an objective swallow study (i.e., MBSS) to assess swallow integrity, r/o aspiration, and   Rec:     Puree with thin - Feeding Assistance  Aspiration precautions  HOB >45 during po intake, remain >30 for 30-45 minutes after po   Small bites/sips; alternate liquid/solid with slow feeding rate   Oral care TID  Meds crushed in puree      Outcome: Progressing Towards Goal     SPEECH LANGUAGE PATHOLOGY BEDSIDE SWALLOW   EVALUATION & TREATMENT     Patient: Dulce Andrews (19 y.o. male)  Date: 9/26/2021  Primary Diagnosis: UTI (urinary tract infection) [N39.0]        Precautions: Aspiration Risk      PLOF: Per H&P    ASSESSMENT :  Based on the objective data described below, the patient presents with moderate oral and suspected mild pharyngeal dysphagia in the setting of UTI. Chart Review and clearance obtained by RN. Today, SLP conducted a Clinical Beside Swallow Evaluation, per MD orders. Pt A&Ox2 and able to follow commands. Caregiver at bedside reporting good po intake of regular solids up until ~ 2 weeks ago. Speech/voice WNLS. Oral mech examination revealed structures functional for speech and deglutition. Pt observed with thin liquids +/- straw via single sips and successive swallows with timely swallow initiation, adequate laryngeal elevation to palpation and no overt s/sx aspiration. Pt demo'd acceptance of puree with functional oral prep. Pt demo'd labored/disorganized bolus formation/manipulation/ mastication with piecemeal deglutition.  ModA warranted to assist to achieve oral clear of > 50%of solid trials; liquid wash x2 and tsp x2 of applesauce successful to clear. SLP RECS: continue Puree diet with  thin liquids, meds crushed in puree with oral care TID, positioned > 45* with all meals and 30 min after, standard aspiration precautions, and feeding assistance to promote po intake. ST will continue to follow. Pt would further benefit from Palliative consult. Pt/spouse/caregiver educated with regard to s/sx aspiration, positioning, aspiration risk, comp strategies, diet recs and role of SLP. D/w RNAmber      TREATMENT :  Skilled therapy initiated; Educated pt on aspiration precautions and importance of compensatory swallow techniques to decrease aspiration risk (decrease rate of intake & sip/bite size, upright @HOB for all po intake and ~30 minutes after po); verbalized comprehension. SLP to follow as indicated. Patient will benefit from skilled intervention to address the above impairments. Patient's rehabilitation potential is considered to be Guarded  Factors which may influence rehabilitation potential include:   []            None noted  []            Mental ability/status  [x]            Medical condition  []            Home/family situation and support systems  []            Safety awareness  []            Pain tolerance/management  []            Other:      PLAN :  Recommendations and Planned Interventions:  See above  Frequency/Duration: Patient will be followed by speech-language pathology 1-2 times per day/4-7 days per week to address goals. Discharge Recommendations: To Be Determined     SUBJECTIVE:   Patient stated *Casimiro.     OBJECTIVE:     Past Medical History:   Diagnosis Date    Allergy     Anxiety states     Arthritis     Benign essential hypertension     Depressive disorder     Diverticulitis     Dizziness and giddiness     Elevated prostate specific antigen (PSA)     Glaucoma     Gross hematuria     Hypertrophy of prostate with urinary obstruction and other lower urinary tract symptoms (LUTS)     Nocturia     Osteoarthrosis involving multiple sites     Tremor      Past Surgical History:   Procedure Laterality Date    HX APPENDECTOMY      HX HERNIA REPAIR      HX OTHER SURGICAL      rectal fissure surgery    HX OTHER SURGICAL Bilateral     eye surgery     Home Situation:   Home Situation  Home Environment: Private residence  One/Two Story Residence: One story  Living Alone: Yes  Support Systems: Child(sara), Caregiver/Home Care Staff  Patient Expects to be Discharged to[de-identified] Hospice (comment)  Current DME Used/Available at Home: None      Cognitive and Communication Status:  Neurologic State: Alert, Confused  Orientation Level: Oriented to person, Oriented to place  Cognition: Decreased attention/concentration, Follows commands, Memory loss  Perception: Appears intact  Perseveration: No perseveration noted  Safety/Judgement: Decreased awareness of need for safety, Decreased insight into deficits  Oral Assessment:  Oral Assessment  Labial: Decreased seal  Dentition: Intact  Oral Hygiene: fair  Lingual: Decreased strength; Incoordinated  Mandible: No impairment  Gag Reflex: No impairment  P.O. Trials:  Patient Position: 90  Vocal quality prior to P.O.: No impairment  Consistency Presented: All consistencies  How Presented: SLP-fed/presented     Bolus Acceptance: Impaired  Bolus Formation/Control: Impaired  Type of Impairment: Anterior;Delayed;Mastication;Piecemeal  Propulsion: Delayed (# of seconds); Discoordination  Oral Residue: Greater than 50% of bolus; Lingual  Initiation of Swallow: No impairment  Laryngeal Elevation: Functional  Aspiration Signs/Symptoms: None  Pharyngeal Phase Characteristics: Easily fatigued   Effective Modifications: Small sips and bites; Other (comment)  Cues for Modifications:  Moderate       Oral Phase Severity: Moderate  Pharyngeal Phase Severity : Mild    PAIN:  Pain level pre-treatment: 0/10   Pain level post-treatment: 0/10   Pain Intervention(s): Medication (see MAR); Rest, Ice, Repositioning  Response to intervention: Nurse notified, See doc flow    After treatment:   []            Patient left in no apparent distress sitting up in chair  [x]            Patient left in no apparent distress in bed  [x]            Call bell left within reach  [x]            Nursing notified  []            Family present  []            Caregiver present  []            Bed alarm activated    COMMUNICATION/EDUCATION:   [x]            Aspiration precautions; swallow safety; compensatory techniques. [x]            Patient/family have participated as able in goal setting and plan of care. [x]            Patient/family agree to work toward stated goals and plan of care. []            Patient understands intent and goals of therapy; neutral about participation. []            Patient unable to participate in goal setting/plan of care; educ ongoing with interdisciplinary staff  [x]         Posted safety precautions in patient's room.     Thank you for this referral.  Shanita Cody, SLP  MA, CCC-SLP  Speech-Language Pathologist    Time Calculation: 23 mins  Evaluation Time: 8 minutes   Treatment Time: 15 minutes

## 2021-09-27 NOTE — PROGRESS NOTES
Hospitalist Progress Note    Patient: Alvaro Franz Age: 80 y.o. : 1922 MR#: [id number] SSN: [id number]  Date/Time: 2021 12:15 PM    DOA: 2021  PCP: Yessica Carlson MD    Subjective:     Fever noted  Blood culture with 1 set grew Coag Neg staph. Urine with candida  Currently, no diarrhea. He is on Oral vancomycin   Sitter at bedside  Patient is minimal vocalization, expressed he has no pain. ID consult follow today. Family has accepted hospice and consider further home care when equipment arrived       Interval Hospital Course:      ROS: limited but no pain complaint, no headache, no stomach pain, no chest pain      Assessment/Plan:     1. Hip pain, this is likely musculoskeletal, no fracture on hip xray        Likely osteoarthritis   2. Acute cystitis with hematuria, associate with dove catheter usage        Noted candida growth, possibly candida cystitis   3. DALLIN on CKD 3  4. Chronic urinary retention with chronic dove cath   5. Recent C. Diff diarrhea   6. Failure to thrive with severe dysphagia   7. Age-related cognitive impairment   8. Debility and fall risk   9. Normocytic anemia     Spoke with ID for treatment plan. Continue oral vancomycin for his C. Diff   IV fluid for now but he is not taking in enough oral intake for hydration   Spoke with palliative care team.   He is appropriate for hospice and comfort care measure   Add morphine and ativan for anxiety   Hospice team follow up, will need to set home equipment prior to discharge     DNR    Additional Notes:    Time spent >30 minutes    Case discussed with:  [x]Patient  [x]Family  [x]Nursing  []Case Management  DVT Prophylaxis:  []Lovenox  [x]Hep SQ  []SCDs  []Coumadin   []On Heparin gtt    Signed By: Pat Jin MD     2021 12:15 PM              Objective:   VS:   Visit Vitals  BP (!) 110/57   Pulse 97   Temp 99.6 °F (37.6 °C)   Resp 18   Ht 5' 10\" (1.778 m)   Wt 66.7 kg (147 lb)   SpO2 93%   BMI 21.09 kg/m²      Tmax/24hrs: Temp (24hrs), Av.8 °F (37.7 °C), Min:98.4 °F (36.9 °C), Max:101.3 °F (38.5 °C)      Intake/Output Summary (Last 24 hours) at 2021 1216  Last data filed at 2021 0900  Gross per 24 hour   Intake 1106.25 ml   Output 1200 ml   Net -93.75 ml       Tele:   General:  Cooperative, Not in acute distress, frail  HEENT: PERRL, EOMI, supple neck, no JVD, dry oral mucosa  Cardiovascular: S1S2 regular, no rub/gallop   Pulmonary: air entry bilaterally, no wheezing, no crackle  GI:  Soft, non tender, non distended, +bs, no guarding   Extremities:  No pedal edema, +distal pulses appreciated   Neuro: Awake to commends and queries    Additional:       Current Facility-Administered Medications   Medication Dose Route Frequency    albuterol-ipratropium (DUO-NEB) 2.5 MG-0.5 MG/3 ML  3 mL Nebulization Q4H PRN    fluconazole (DIFLUCAN) tablet 200 mg  200 mg Oral DAILY    acetaminophen (TYLENOL) tablet 650 mg  650 mg Oral Q6H PRN    heparin (porcine) injection 5,000 Units  5,000 Units SubCUTAneous Q8H    sodium chloride (NS) flush 5-10 mL  5-10 mL IntraVENous PRN    finasteride (PROSCAR) tablet 5 mg  5 mg Oral DAILY    L. acidophilus,casei,rhamnosus (BIO-K PLUS) capsule 1 Capsule  1 Capsule Oral DAILY    propranoloL (INDERAL) tablet 10 mg  10 mg Oral BID    tamsulosin (FLOMAX) capsule 0.4 mg  0.4 mg Oral DAILY    vancomycin 50 mg/mL oral solution (compounded) 125 mg  125 mg Oral Q6H    dextrose 5% and 0.9% NaCl infusion  75 mL/hr IntraVENous CONTINUOUS    acetaminophen (TYLENOL) suppository 325 mg  325 mg Rectal Q6H PRN    ondansetron (ZOFRAN) injection 4 mg  4 mg IntraVENous Q6H PRN    pantoprazole (PROTONIX) 40 mg in 0.9% sodium chloride 10 mL injection  40 mg IntraVENous Q24H    morphine injection 1 mg  1 mg IntraVENous Q4H PRN            Lab/Data Review:  Labs: Results:       Chemistry Recent Labs     21  0327 21  1540   * 91    142   K 4.6 5.0    107   CO2 26 27   BUN 56* 53*   CREA 2.32* 2.49*   BUCR 24* 21*   AGAP 7 8   CA 7.8* 8.4*     Recent Labs     09/25/21  1540   ALT 64*   TP 8.6*   ALB 2.2*   GLOB 6.4*   AGRAT 0.3*      CBC w/Diff Recent Labs     09/26/21  0327 09/25/21  1540 09/25/21  1540   WBC 9.3  --  9.7   RBC 2.87*  --  3.33*   HGB 8.4*  --  9.6*   HCT 26.8*  --  30.5*   MCV 93.4   < > 91.6   MCH 29.3   < > 28.8   MCHC 31.3   < > 31.5   RDW 14.6*   < > 14.5     --  400   GRANS 78*  --  78*   LYMPH 12*  --  12*   EOS 1  --  1    < > = values in this interval not displayed. Coagulation No results for input(s): PTP, INR, APTT, INREXT, INREXT in the last 72 hours.     Iron/Ferritin Lab Results   Component Value Date/Time    Iron 37 (L) 09/29/2014 05:53 PM    TIBC 241 (L) 09/29/2014 05:53 PM    Iron % saturation 15 09/29/2014 05:53 PM       BNP    Cardiac Enzymes Lab Results   Component Value Date/Time     09/17/2021 10:00 AM    CK - MB 2.0 09/17/2021 10:00 AM    CK-MB Index 1.5 09/17/2021 10:00 AM    Troponin-I, QT 0.07 (H) 09/17/2021 10:00 AM        Lactic Acid    Thyroid Studies          All Micro Results     Procedure Component Value Units Date/Time    CULTURE, BLOOD [[id number]]  (Abnormal) Collected: 09/25/21 1540    Order Status: Completed Specimen: Blood Updated: 09/27/21 1002     Special Requests: LEFT        GRAM STAIN       AEROBIC AND ANAEROBIC BOTTLES GRAM POSITIVE COCCI IN GROUPS                  CALLED TO AND CORRECTLY REPEATED BY: HANNA,RN 5S AT 0900 [id number] TO S           Culture result:       STAPHYLOCOCCUS SPECIES, COAGULASE NEGATIVE GROWING IN THE AEROBIC BOTTLE ( SITE = L AC)                  GRAM POSITIVE COCCI IN CLUSTERS GROWING IN THE ANAEROBIC BOTTLE (SITE = LAC)                  IDENTIFICATION AND SUSCEPTIBILITY TO FOLLOW          CULTURE, URINE [[id number]]  (Abnormal) Collected: 09/25/21 1723    Order Status: Completed Specimen: Cath Urine Updated: 09/27/21 0961     Special Requests: NO SPECIAL REQUESTS        Friendship Count --        >100,000  COLONIES/mL       Culture result: CANDIDA ALBICANS       CULTURE, BLOOD [[id number]] Collected: 09/26/21 1325    Order Status: Completed Specimen: Blood Updated: 09/27/21 0842     Special Requests: NO SPECIAL REQUESTS        Culture result: NO GROWTH AFTER 18 HOURS       CULTURE, BLOOD [[id number]] Collected: 09/25/21 1534    Order Status: Completed Specimen: Blood Updated: 09/27/21 0656     Special Requests: NO SPECIAL REQUESTS        Culture result: NO GROWTH 2 DAYS       COVID-19 RAPID TEST [[id number]]     Order Status: Canceled     COVID-19 RAPID TEST [[id number]] Collected: 09/25/21 1545    Order Status: Completed Specimen: Nasopharyngeal Updated: 09/25/21 1654     Specimen source Nasopharyngeal        COVID-19 rapid test Not detected        Comment: Rapid Abbott ID Now       Rapid NAAT:  The specimen is NEGATIVE for SARS-CoV-2, the novel coronavirus associated with COVID-19. Negative results should be treated as presumptive and, if inconsistent with clinical signs and symptoms or necessary for patient management, should be tested with an alternative molecular assay. Negative results do not preclude SARS-CoV-2 infection and should not be used as the sole basis for patient management decisions. This test has been authorized by the FDA under an Emergency Use Authorization (EUA) for use by authorized laboratories. Fact sheet for Healthcare Providers: ConventionUpdate.co.nz  Fact sheet for Patients: ConventionUpdate.co.nz       Methodology: Isothermal Nucleic Acid Amplification                 Images:    CT (Most Recent). XRAY (Most Recent) XR Results (most recent):  Results from Hospital Encounter encounter on 09/25/21    XR HIP RT W OR WO PELV 2-3 VWS    Narrative  Hip series. CPT code: 94810    Clinical history:  With tenderness in the right hip. Technique: Two views of the hip.     Comparison: No priors    Findings: Right hip is flexed for the exam. There are no fractures. Normal  alignment is seen at the hip. Bones are normally mineralized. There are mild  hypertrophic changes right hip. Pelvis is unremarkable. Impression  No evidence for hip fracture. EKG No results found for this or any previous visit.      2D ECHO

## 2021-09-27 NOTE — CONSULTS
[facility]: [phone]  [facility]: [phone]     Patient Name: Nany Thomson  YOB: 1922    Date of consult: 9/27/21  Reason for Consult: establish goals of care  Requesting Provider: [place] eRn MD   Primary Care Physician: Aubrey Cardenas MD      SUMMARY:   Nany Thomson is a 80 y.o. male with a past history of age related cognitive impairment, Debility, CKD 3, BPH, recent C-diff infection, HTN, who was admitted on 9/25/2021 from home  with a diagnosis of Left hip pain and possible UTI. Current medical issues leading to Palliative Medicine involvement include: Establish goals of care    CHIEF COMPLAINT: hungry, non verbal    HPI/SUBJECTIVE:    Patient is a 70-year-old male came in from home after recently being discharged from the hospital with a complicated urinary tract infection. He also has had a recent C. difficile infection with prolonged use of antibiotics. Patient lives at home with his long-term girlfriend and multiple hired caregivers. Most recent admission patient had complaints of generalized pain mostly in the right hip. Recent x-ray of the right hip shows no fracture patient remains on oral vancomycin for the C. difficile diagnosis. Patient has debility with recent weight loss. The patient is:   [] Verbal and participatory  [x] Non-participatory due to:     GOALS OF CARE:  Patient/Health Care Proxy Stated Goals: Prolong life      TREATMENT PREFERENCES:   Code Status: DNR         PALLIATIVE DIAGNOSES:   1. Goals of care/ACP  2. Advanced age/encounter for palliative medicine  3. Protein deficient malnutrition  4. Debility       PLAN:   1. Goals of care/ACP  This NP into see patient at the bedside he remains nonverbal although he will state his last name if you say it. Fed patient his breakfast he ate approximately 70% without incident of aspiration. Patient able to indicate when he was full.   Reached out to patient's MPOA/girlfriend and Llamas. She was reluctant to speak at this time but did verify that she is okay with patient coming home on hospice. Review of patient's prior POST done by Dr. Kanchan Malave noted that he did sign it just not in the proper place. Have refiled for rescanning. Patient also has DDNR. Priyanka Garza declined coming in to complete a new POST for comfort care. Hospice consult has been addressed. Plan is for discharge home with hospice services. Patient remains DO NOT RESUSCITATE DO NOT INTUBATE with full care while in the hospital  2. Encounter for palliative medicine/advanced age  Patient to go home with hospice services due to chronic illness weight loss, dysphagia, chronic infections, poor long-term prognosis. Pt age 80/very old. 3.  Protein deficiency malnutrition  Patient's albumin level 2.2. Minimal intake of food less than required calories needed for long-term sustenance. 4.  Debility  Patient has current palliative performance score of around 30 he is bedbound, total care severe reduction in nutritional intake and drowsy/confusion level of consciousness. Patient nonambulatory incontinent of bowel and bladder. 5.   Initial consult note routed to primary continuity provider  6. Communicated plan of care with: Palliative IDT      Advance Care Planning:  [] [facility] Interdisciplinary Team has updated the ACP Navigator with [address] and Patient Capacity    Primary Decision [address] ([address]):   Primary Decision Maker: Aurora Matias - Girlfriend - [phone]    Secondary Decision Maker: Antionette Heaton - Next of Kin - [phone]    Medical Interventions: Limited additional interventions   Other Instructions:         As far as possible, the palliative care team has discussed with patient / health care proxy about goals of care / treatment preferences for patient.          HISTORY:     History obtained from: Chart review  Active Problems:    UTI (urinary tract infection) (8/27/2021)      Past Medical History:   Diagnosis Date    Allergy     Anxiety states     Arthritis     Benign essential hypertension     Depressive disorder     Diverticulitis     Dizziness and giddiness     Elevated prostate specific antigen (PSA)     Glaucoma     Gross hematuria     Hypertrophy of prostate with urinary obstruction and other lower urinary tract symptoms (LUTS)     Nocturia     Osteoarthrosis involving multiple sites     Tremor       Past Surgical History:   Procedure Laterality Date    HX APPENDECTOMY      HX HERNIA REPAIR      HX OTHER SURGICAL      rectal fissure surgery    HX OTHER SURGICAL Bilateral     eye surgery      Family History   Problem Relation Age of Onset    No Known Problems Mother     No Known Problems Father     Hypertension Maternal Grandfather     Stroke Maternal Grandfather      History reviewed, no pertinent family history.   Social History     Tobacco Use    Smoking status: Former Smoker    Smokeless tobacco: Never Used   Substance Use Topics    Alcohol use: Yes     Comment: occasional     Allergies   Allergen Reactions    Sulfa (Sulfonamide Antibiotics) Other (comments)     Dizziness/ passed out      Current Facility-Administered Medications   Medication Dose Route Frequency    albuterol-ipratropium (DUO-NEB) 2.5 MG-0.5 MG/3 ML  3 mL Nebulization Q4H PRN    fluconazole (DIFLUCAN) tablet 200 mg  200 mg Oral DAILY    acetaminophen (TYLENOL) tablet 650 mg  650 mg Oral Q6H PRN    heparin (porcine) injection 5,000 Units  5,000 Units SubCUTAneous Q8H    sodium chloride (NS) flush 5-10 mL  5-10 mL IntraVENous PRN    finasteride (PROSCAR) tablet 5 mg  5 mg Oral DAILY    L. acidophilus,casei,rhamnosus (BIO-K PLUS) capsule 1 Capsule  1 Capsule Oral DAILY    propranoloL (INDERAL) tablet 10 mg  10 mg Oral BID    tamsulosin (FLOMAX) capsule 0.4 mg  0.4 mg Oral DAILY    vancomycin 50 mg/mL oral solution (compounded) 125 mg  125 mg Oral Q6H  dextrose 5% and 0.9% NaCl infusion  75 mL/hr IntraVENous CONTINUOUS    acetaminophen (TYLENOL) suppository 325 mg  325 mg Rectal Q6H PRN    ondansetron (ZOFRAN) injection 4 mg  4 mg IntraVENous Q6H PRN    pantoprazole (PROTONIX) 40 mg in 0.9% sodium chloride 10 mL injection  40 mg IntraVENous Q24H    morphine injection 1 mg  1 mg IntraVENous Q4H PRN          Clinical Pain Assessment (nonverbal scale for nonverbal patients): Activity (Movement): Lying quietly, normal position    Duration: for how long has pt been experiencing pain (e.g., 2 days, 1 month, years)  Frequency: how often pain is an issue (e.g., several times per day, once every few days, constant)     FUNCTIONAL ASSESSMENT:     Palliative Performance Scale (PPS):  PPS: 30    ECOG  ECOG Status : Completely disabled     PSYCHOSOCIAL/SPIRITUAL SCREENING:      Any spiritual / Buddhism concerns:  [] Yes /  [x] No    Caregiver Burnout:  [] Yes /  [] No /  [x] No Caregiver Present      Anticipatory grief assessment:   [x] Normal  / [] Maladaptive        REVIEW OF SYSTEMS:     Systems: constitutional, ears/nose/mouth/throat, respiratory, gastrointestinal, genitourinary, musculoskeletal, integumentary, neurologic, psychiatric, endocrine. Positive findings noted below. Modified ESAS Completed by: provider                                       Positive and pertinent negative findings in ROS are noted above in HPI. The following systems were [x] reviewed / [] unable to be reviewed as noted in HPI  Other findings are noted below.      PHYSICAL EXAM:     Constitutional: alert but non communicative, does not appear in pain   Eyes: pupils equal, anicteric  ENMT: no nasal discharge, moist mucous membranes  Cardiovascular: regular rhythm, distal pulses intact  Respiratory: breathing not labored, symmetric  Gastrointestinal: soft non-tender, +bowel sounds  Musculoskeletal: no deformity, no tenderness to palpation  Skin: warm, dry  Neurologic: AA not following commands or moving all extremities    Other: Wt Readings from Last 3 Encounters:   09/27/21 66.7 kg (147 lb)   09/19/21 64.4 kg (142 lb)   09/10/21 63.5 kg (140 lb)     Blood pressure (!) 110/57, pulse 97, temperature 99.6 °F (37.6 °C), resp. rate 18, height 5' 10\" (1.778 m), weight 66.7 kg (147 lb), SpO2 93 %. Pain:  Pain Scale 1: Visual  Pain Intensity 1: 0     Pain Location 1: Generalized (entire body per patient)  Pain Orientation 1: Upper, Lower  Pain Description 1: Aching, Constant  Pain Intervention(s) 1: Medication (see MAR)       LAB AND IMAGING FINDINGS:     Lab Results   Component Value Date/Time    WBC 9.3 09/26/2021 03:27 AM    HGB 8.4 (L) 09/26/2021 03:27 AM    PLATELET 346 95/25/6162 03:27 AM     Lab Results   Component Value Date/Time    Sodium 139 09/26/2021 03:27 AM    Potassium 4.6 09/26/2021 03:27 AM    Chloride 106 09/26/2021 03:27 AM    CO2 26 09/26/2021 03:27 AM    BUN 56 (H) 09/26/2021 03:27 AM    Creatinine 2.32 (H) 09/26/2021 03:27 AM    Calcium 7.8 (L) 09/26/2021 03:27 AM    Magnesium 2.3 09/26/2021 03:27 AM      Lab Results   Component Value Date/Time    Alk.  phosphatase 95 09/25/2021 03:40 PM    Protein, total 8.6 (H) 09/25/2021 03:40 PM    Albumin 2.2 (L) 09/25/2021 03:40 PM    Globulin 6.4 (H) 09/25/2021 03:40 PM     Lab Results   Component Value Date/Time    INR 1.0 09/29/2014 05:53 PM    Prothrombin time 13.5 09/29/2014 05:53 PM      Lab Results   Component Value Date/Time    Iron 37 (L) 09/29/2014 05:53 PM    TIBC 241 (L) 09/29/2014 05:53 PM    Iron % saturation 15 09/29/2014 05:53 PM      No results found for: PH, PCO2, PO2  No components found for: [place]   Lab Results   Component Value Date/Time     09/17/2021 10:00 AM    CK - MB 2.0 09/17/2021 10:00 AM              Total time: 50 minutes   Counseling / coordination time, spent as noted above:   > 50% counseling / coordination:  Time spent in direct consultation with the patient, medical team, and family Prolonged service was provided for  []30 min   []75 min in face to face time in the presence of the patient, spent as noted above. Time Start:   Time End:     Disclaimer: Sections of this note are dictated using utilizing voice recognition software, which may have resulted in some phonetic based errors in grammar and contents. Even though attempts were made to correct all the mistakes, some may have been missed, and remained in the body of the document. If questions arise, please contact our department.

## 2021-09-27 NOTE — PROGRESS NOTES
Problem: Pressure Injury - Risk of  Goal: *Prevention of pressure injury  Description: Document Jeo Scale and appropriate interventions in the flowsheet. Outcome: Progressing Towards Goal  Note: Pressure Injury Interventions:  Sensory Interventions: Avoid rigorous massage over bony prominences    Moisture Interventions: Absorbent underpads    Activity Interventions: Pressure redistribution bed/mattress(bed type)    Mobility Interventions: Pressure redistribution bed/mattress (bed type)    Nutrition Interventions: Document food/fluid/supplement intake, Offer support with meals,snacks and hydration    Friction and Shear Interventions: Apply protective barrier, creams and emollients                Problem: Patient Education: Go to Patient Education Activity  Goal: Patient/Family Education  Outcome: Progressing Towards Goal     Problem: Falls - Risk of  Goal: *Absence of Falls  Description: Document Miroslava Fall Risk and appropriate interventions in the flowsheet.   Outcome: Progressing Towards Goal  Note: Fall Risk Interventions:       Mentation Interventions: Bed/chair exit alarm, Door open when patient unattended    Medication Interventions: Bed/chair exit alarm    Elimination Interventions: Bed/chair exit alarm    History of Falls Interventions: Bed/chair exit alarm, Door open when patient unattended         Problem: Patient Education: Go to Patient Education Activity  Goal: Patient/Family Education  Outcome: Progressing Towards Goal     Problem: Pain  Goal: *Control of Pain  Outcome: Progressing Towards Goal     Problem: Patient Education: Go to Patient Education Activity  Goal: Patient/Family Education  Outcome: Progressing Towards Goal     Problem: Altered Thought Process (Adult/Pediatric)  Goal: *STG: Participates in treatment plan  Outcome: Progressing Towards Goal  Goal: *STG: Remains safe in hospital  Outcome: Progressing Towards Goal  Goal: *STG: Seeks staff when feelings of anxiety and fear arise  Outcome: Progressing Towards Goal  Goal: *STG: Complies with medication therapy  Outcome: Progressing Towards Goal  Goal: *STG: Attends activities and groups  Outcome: Progressing Towards Goal  Goal: *STG: Decreased delusional thinking  Outcome: Progressing Towards Goal  Goal: *STG: Decreased hallucinations  Outcome: Progressing Towards Goal  Goal: *STG: Absence of lethality  Outcome: Progressing Towards Goal  Goal: *STG: Demonstrates ability to understand and use improved judgment in daily activities and relationships  Outcome: Progressing Towards Goal  Goal: *LTG: Returns to baseline functioning  Outcome: Progressing Towards Goal  Goal: Interventions  Outcome: Progressing Towards Goal     Problem: Patient Education: Go to Patient Education Activity  Goal: Patient/Family Education  Outcome: Progressing Towards Goal     Problem: Pressure Injury - Risk of  Goal: *Prevention of pressure injury  Description: Document Joe Scale and appropriate interventions in the flowsheet.   Outcome: Progressing Towards Goal  Note: Pressure Injury Interventions:  Sensory Interventions: Avoid rigorous massage over bony prominences    Moisture Interventions: Absorbent underpads    Activity Interventions: Pressure redistribution bed/mattress(bed type)    Mobility Interventions: Pressure redistribution bed/mattress (bed type)    Nutrition Interventions: Document food/fluid/supplement intake, Offer support with meals,snacks and hydration    Friction and Shear Interventions: Apply protective barrier, creams and emollients                Problem: Patient Education: Go to Patient Education Activity  Goal: Patient/Family Education  Outcome: Progressing Towards Goal     Problem: Impaired Skin Integrity/Pressure Injury Treatment  Goal: *Improvement of Existing Pressure Injury  Outcome: Progressing Towards Goal  Goal: *Prevention of pressure injury  Description: Document Joe Scale and appropriate interventions in the flowsheet. Outcome: Progressing Towards Goal     Problem: Patient Education: Go to Patient Education Activity  Goal: Patient/Family Education  Outcome: Progressing Towards Goal     Problem: Patient Education: Go to Patient Education Activity  Goal: Patient/Family Education  Outcome: Progressing Towards Goal     Problem: Nutrition Deficit  Goal: *Optimize nutritional status  Outcome: Progressing Towards Goal     Problem: Risk for Spread of Infection  Goal: Prevent transmission of infectious organism to others  Description: Prevent the transmission of infectious organisms to other patients, staff members, and visitors.   Outcome: Progressing Towards Goal     Problem: Patient Education:  Go to Education Activity  Goal: Patient/Family Education  Outcome: Progressing Towards Goal

## 2021-09-27 NOTE — PROGRESS NOTES
Palliative Medicine    Telephone call made to pt's medical POA/girlfriend Isabelle Santos. She stated that now was not a really great time to talk and that our phone call would have to be short. Ms. Paula Minor confirmed that she wants the pt to come home on hospice. Hospice consult has already been placed. Asked if Ms. Paula Minor would be available to come to the hospital to sign a new POST, but she stated, \"I don't know\" and proceeded to hang up on me. Pt has a DDNR on file. He remains DNR/DNI. Potential dispo plan is home with the support of hospice. Thank you for the Palliative Medicine consult and allowing us to participate in the care of Mr. Aguirre. Will continue to monitor and provide support.     Lyn Cornejo RN, BSN  Palliative Medicine Inpatient RN  [facility]  Palliative COPE Line: [phone] ([phone]

## 2021-09-27 NOTE — PROGRESS NOTES
Discharge/Transition Planning    0063:  Following for Hospice set up  1045: [address] extension at 2015 and received voicemail  1140: Spoke with Roni Brooke and they will look into  referral  Katherine Weir RN BSN  Outcomes Manager    Pager # [phone]

## 2021-09-27 NOTE — HOSPICE
Spoke with Ms. Llamas via phone   Discussed [facility] philosophy, services, criteria, and IDT. Discussed caregiver need for round the clock care with Ms. Fina Heaton  primary caregiver identified as Ms. Llamas  Caregiver concerns identified as n/a     Answered all questions. Ms. Fina Heaton advised they have 24 hour caregivers already in the home. She stated she was told a bed was ordered and would be delivered sometime today but unsure who ordered the bed. Provided with 24/7 contact information. Thank you for the referral to [facility]. If we can be of further assistance please contact [phone].     Ramez Cheek RN  [place] [address] [place] [address], [address] [place] [address] Str.  [phone]  Email: [email or web address]

## 2021-09-27 NOTE — ROUTINE PROCESS
RESPIRATORY MEDICATION PROTOCOL ASSESSMENT      Patient  Sylvester Martinez     80 y.o.   male     9/27/2021  2:43 AM    Breath Sounds Pre Procedure:                                               Breath Sounds Post Procedure:                                                 Breathing pattern: Pre procedure             Post procedure       Cough: Pre procedure                  Post procedure      Heart Rate: Pre procedure             Post procedure      Resp Rate: Pre procedure              Post procedure          Nebulizer Therapy: Current medications         Problem List:   Patient Active Problem List   Diagnosis Code    Hypertrophy of prostate with urinary obstruction and other lower urinary tract symptoms (LUTS) N40.1, N13.8    Elevated prostate specific antigen (PSA) R97.20    Tremor R25.1    Dizziness and giddiness R42    Benign essential hypertension I10    Anxiety states F41.1    Osteoarthrosis involving multiple sites M15.9    Depressive disorder F32.9    Memory loss R41.3    Urinary retention R33.9    UTI (urinary tract infection) N39.0    Sepsis (HCC) A41.9    Leukocytosis D72.829    Fever R50.9    DALLIN (acute kidney injury) ([facility] [address] N17.9       Patient alert and cooperative to use MDI: No    Home Respiratory Therapy Regimen/Frequency:  NO  Medication   Device  Frequency     SEVERITY INDEX:    ITEM 0 1 2 3 4 Score   Respiratory Pattern and or Rate Regular  10-19 Regular  20-24   24-30    30-34 Severe SOB or   Greater than 35 0   Breath Sounds Clear Occasional Wheeze Mild Wheezing Moderate Wheezing  wheezing/Absent breath sounds 0   Shortness of Breath None Dyspnea on Exertion Dyspnea at Rest Moderate Shortness of Breath at Rest Severe Shortness of Breath - Limited Speech 0       Total Score  0    * Scoring Guidelines  0-4 pts:  PRN-BID   5-7 pts:  BID, TID, QID  8-9 pts:  TID, QID, Q6  10-12 pts:  Q4-Q6  * - Guidelines used with clinical judgement.   PRN Treatments can be ordered to supplement scheduled treatments. Regardless of score, frequency should not be less than normal home regimen.     Recommended Order/Frequency: PRN    Comments:          Respiratory Therapist: Adrián Valle RT

## 2021-09-27 NOTE — ROUTINE PROCESS
Bedside verbal report given to Marvetta Severs, Oncoming Nurse. Report consisted of patients Situation, Background, Assessment and   Recommendations(SBAR). Information from the following report(s): Kardex, MAR and Recent Results was reviewed with the oncoming nurse. Opportunity for questions and clarification was provided.

## 2021-09-27 NOTE — CONSULTS
Infectious Disease Consultation Note        Reason: Fever, coagulase-negative Staphylococcus bacteremia, suspected cystitis    Current abx Prior abx   Cefepime, levofloxacin since 9/25/2021  P.o. vancomycin since 9/20/2021        Lines:       Assessment :    27-year-old gentleman with a history of anxiety, hypertension, diverticulitis, osteoarthritis, and BPH presented to emergency room on 9/25/2021 with hip pain. BPH and outlet obstruction s/p Du catheter placed on 9/10/2021     Hospitalization at [facility] 9/17/21-9/21/21 for acute urinary retention, probable cystitis, recent c.diff colitis     Negative urine cultures 9/17 could be due to prior antibiotics. Significant pyuria noted on urine analysis 9/17, 3+ bacteria, urinary retention concerning for cystitis. Now with fever with tm:101.3, candida in urine, coagulase negative staph bacteremia, worsening renal function    Patient presents with a highly complex clinical picture. Exact etiology of fever not entirely clear at this time. It could be multifactorial-secondary to dehydration, Candida cystitis, ? Silent aspiration    Single positive blood culture for coagulase-negative Staphylococcus on 9/25/2021-likely contaminant     Recent history of C. Difficile-positive C. difficile PCR 9/10/2021. Status post metronidazole 9/11-9/16, status post oral vancomycin since 9/20/2021 Resolved diarrhea     Probable Candida cystitis-significant pyuria noted on urine analysis 9/25 along with 4+ yeast, fever concerning for evolving Candida cystitis     Will attempt to avoid prolonged antibiotic for probable cystitis since recent history of C. Difficile, risk of recurrence with prolonged antibiotic use     Recommendations:     1.  continue oral vancomycin 125 mg oral every 6 hours till 9/30/21  2. D/c cefepime. Start po diflucan x 2 weeks  3. We will hold off on starting IV vancomycin since low clinical probability of bloodstream infection  4.   Agree with ongoing discussions regarding hospice  5. Recommend tylenol po prn for fever     Above plan was discussed in details with caretaker at bedside, RN and dr Francisco Klein. Please call me if any further questions or concerns. Will continue to participate in the care of this patient. Thank you for consultation request.   HPI:      72-year-old gentleman with a history of anxiety, hypertension, diverticulitis, osteoarthritis, and BPH presented to emergency room on 9/25/2021 with hip pain. Patient is known to me from recent inpatient hospitalization at [facility]. BPH and outlet obstruction s/p Du catheter placed on 9/10/2021. Hospitalization at [facility] 9/17/21-9/21/21 for acute urinary retention, probable cystitis, recent c.diff colitis. Negative urine cultures 9/17 felt to be due to prior antibiotics. Significant pyuria noted on urine analysis 9/17, 3+ bacteria, urinary retention concerning for cystitis. Was sent home on oral vancomycin. Apparently patient was complaining of generalized body ache but more pain in his right hip. No fall per caregiver at bedside. Hence brought to emergency room for further evaluation. In the ED was noted to have significant pyuria. Was started on cefepime, levofloxacin. Urine culture now growing Candida. 1 set of blood culture positive for coagulase-negative Staphylococcus. There is ongoing discussion about transitioning patient to hospice. I have been consulted for further recommendations.     Detailed review system not feasible since patient not very communicative         Past Medical History:   Diagnosis Date    Allergy     Anxiety states     Arthritis     Benign essential hypertension     Depressive disorder     Diverticulitis     Dizziness and giddiness     Elevated prostate specific antigen (PSA)     Glaucoma     Gross hematuria     Hypertrophy of prostate with urinary obstruction and other lower urinary tract symptoms (LUTS)     Nocturia     Osteoarthrosis involving multiple sites     Tremor        Past Surgical History:   Procedure Laterality Date    HX APPENDECTOMY      HX HERNIA REPAIR      HX OTHER SURGICAL      rectal fissure surgery    HX OTHER SURGICAL Bilateral     eye surgery       Current Discharge Medication List      CONTINUE these medications which have NOT CHANGED    Details   vancomycin 50 mg/mL oral solution (compounded) Take 2.5 mL by mouth every six (6) hours for 10 days. Qty: 100 mL, Refills: 0      L. acidophilus,casei,rhamnosus (BIO-K PLUS) 50 billion cell cpDR capsule Take 1 Capsule by mouth daily for 17 days. Qty: 17 Capsule, Refills: 0      finasteride (PROSCAR) 5 mg tablet Take 1 Tablet by mouth daily. Qty: 30 Tablet, Refills: 0      tamsulosin (FLOMAX) 0.4 mg capsule Take 1 Capsule by mouth daily.   Qty: 30 Capsule, Refills: 0      propranoloL (INDERAL) 10 mg tablet take 1 tablet by mouth twice a day  Qty: 180 Tab, Refills: 3      pantoprazole (PROTONIX) 40 mg tablet take 1 tablet by mouth once daily  Qty: 120 Tab, Refills: 3             Current Facility-Administered Medications   Medication Dose Route Frequency    albuterol-ipratropium (DUO-NEB) 2.5 MG-0.5 MG/3 ML  3 mL Nebulization Q4H PRN    fluconazole (DIFLUCAN) tablet 200 mg  200 mg Oral DAILY    heparin (porcine) injection 5,000 Units  5,000 Units SubCUTAneous Q8H    sodium chloride (NS) flush 5-10 mL  5-10 mL IntraVENous PRN    finasteride (PROSCAR) tablet 5 mg  5 mg Oral DAILY    L. acidophilus,casei,rhamnosus (BIO-K PLUS) capsule 1 Capsule  1 Capsule Oral DAILY    propranoloL (INDERAL) tablet 10 mg  10 mg Oral BID    tamsulosin (FLOMAX) capsule 0.4 mg  0.4 mg Oral DAILY    vancomycin 50 mg/mL oral solution (compounded) 125 mg  125 mg Oral Q6H    dextrose 5% and 0.9% NaCl infusion  75 mL/hr IntraVENous CONTINUOUS    acetaminophen (TYLENOL) suppository 325 mg  325 mg Rectal Q6H PRN    ondansetron (ZOFRAN) injection 4 mg  4 mg IntraVENous Q6H PRN    pantoprazole (PROTONIX) 40 mg in 0.9% sodium chloride 10 mL injection  40 mg IntraVENous Q24H    morphine injection 1 mg  1 mg IntraVENous Q4H PRN       Allergies: Sulfa (sulfonamide antibiotics)    Family History   Problem Relation Age of Onset    No Known Problems Mother     No Known Problems Father     Hypertension Maternal Grandfather     Stroke Maternal Grandfather      Social History     Socioeconomic History    Marital status: UNKNOWN     Spouse name: Not on file    Number of children: Not on file    Years of education: Not on file    Highest education level: Not on file   Occupational History    Not on file   Tobacco Use    Smoking status: Former Smoker    Smokeless tobacco: Never Used   Substance and Sexual Activity    Alcohol use: Yes     Comment: occasional    Drug use: Not on file    Sexual activity: Not on file   Other Topics Concern    Not on file   Social History Narrative    Not on file     Social Determinants of Health     Financial Resource Strain:     Difficulty of Paying Living Expenses:    Food Insecurity:     Worried About Running Out of Food in the Last Year:     Ran Out of Food in the Last Year:    Transportation Needs:     Lack of Transportation (Medical):      Lack of Transportation (Non-Medical):    Physical Activity:     Days of Exercise per Week:     Minutes of Exercise per Session:    Stress:     Feeling of Stress :    Social Connections:     Frequency of Communication with Friends and Family:     Frequency of Social Gatherings with Friends and Family:     Attends Holiness Services:     Active Member of Clubs or Organizations:     Attends Club or Organization Meetings:     Marital Status:    Intimate Partner Violence:     Fear of Current or Ex-Partner:     Emotionally Abused:     Physically Abused:     Sexually Abused:      Social History     Tobacco Use   Smoking Status Former Smoker   Smokeless Tobacco Never Used        Temp (24hrs), Av.7 °F (37.6 °C), Min:98.4 °F (36.9 °C), Max:101.3 °F (38.5 °C)    Visit Vitals  /68 (BP 1 Location: Right upper arm, BP Patient Position: At rest)   Pulse 68   Temp (!) 101.3 °F (38.5 °C)   Resp 20   Ht 5' 10\" (1.778 m)   Wt 66.7 kg (147 lb)   SpO2 95%   BMI 21.09 kg/m²       ROS: Unable to obtain due to patient factors    Physical Exam:    General appearance -awake, laying on bed, cachectic, not in acute distress, able to answer some questions. Eyes - sclera anicteric, no pallor  Nose - no obvious nasal discharge. Neck - supple, no JVD, trachea is midline  Chest -diminished air entry noted in bases, no wheezes  Heart - S1 and S2 normal  Abdomen - soft, nontender, nondistended, Bowel sounds present, Du catheter in situ with cloudy urine  Neurological -awake, patient is in fetal position, able to move upper extremities sometimes. Musculoskeletal - no joint tenderness or swelling of knees/hips bilaterally  Extremities - no pedal edema noted        Labs: Results:   Chemistry Recent Labs     09/26/21  0327 09/25/21  1540   * 91    142   K 4.6 5.0    107   CO2 26 27   BUN 56* 53*   CREA 2.32* 2.49*   CA 7.8* 8.4*   AGAP 7 8   BUCR 24* 21*   AP  --  95   TP  --  8.6*   ALB  --  2.2*   GLOB  --  6.4*   AGRAT  --  0.3*      CBC w/Diff Recent Labs     09/26/21  0327 09/25/21  1540   WBC 9.3 9.7   RBC 2.87* 3.33*   HGB 8.4* 9.6*   HCT 26.8* 30.5*    400   GRANS 78* 78*   LYMPH 12* 12*   EOS 1 1      Microbiology Recent Labs     09/26/21  1325 09/25/21  1725 09/25/21  1540 09/25/21  1534   CULT NO GROWTH AFTER 18 HOURS AIME ALBICANS* STAPHYLOCOCCUS SPECIES, COAGULASE NEGATIVE GROWING IN THE AEROBIC BOTTLE ( SITE = L AC)* NO GROWTH 2 DAYS          RADIOLOGY:    All available imaging studies/reports in [facility] for this admission were reviewed      Disclaimer: Sections of this note are dictated utilizing voice recognition software, which may have resulted in some phonetic based errors in grammar and contents.  Even though attempts were made to correct all the mistakes, some may have been missed, and remained in the body of the document. If questions arise, please contact our department.     Dr. Flor Muhammad, Infectious Disease Specialist  [phone]  September 27, 2021  9:56 AM

## 2021-09-28 NOTE — PROGRESS NOTES
Nutrition Assessment     Type and Reason for Visit: Reassess, Wound, Consult    Nutrition Recommendations/Plan[de-identified]   - Continue diet per SLP recommendation with Ensure Enlive BID  - Assist with meals PRN. - IVF per MD    Nutrition Assessment:  Referral received for pressure injury. Plan for discharge home with hospice services this evening. Fair to good intake of pureed breakfast meal this morning per caregiver report to nursing. Supplement intake unknown at this time. Malnutrition Assessment:  Malnutrition Status: At risk for malnutrition (specify) (Poor intake PTA and with wt loss (3% x 1 month))     Estimated Daily Nutrient Needs:  Energy (kcal):  6902-7526  Protein (g):  77-96       Fluid (ml/day):  6660-2860    Nutrition Related Findings:  Loose BM 9/28. Meds: bio-k plus      Additional Caloric Sources: D5 NS at 75 mL/hr (90 gm dextrose, 306 kcal per day)    Current Nutrition Therapies:  ADULT DIET Dysphagia - Pureed  ADULT ORAL NUTRITION SUPPLEMENT Breakfast, Lunch; Standard High Calorie/High Protein    Anthropometric Measures:  · Height:  5' 10\" (177.8 cm)  · Current Body Wt:  64.4 kg (141 lb 15.6 oz)  · BMI: 20.4    Nutrition Diagnosis:   · Predicted inadequate energy intake related to acute injury/trauma as evidenced by poor intake prior to admission, weight loss      Nutrition Intervention:  Food and/or Nutrient Delivery: Continue current diet, Continue oral nutrition supplement, IV fluid delivery  Nutrition Education and Counseling: No recommendations at this time, Education not indicated  Coordination of Nutrition Care: Continue to monitor while inpatient, Speech therapy, Feeding assistance/environmental change (meal intake discussed with nursing)    Goals:  PO nutrition intake will meet >75% of patient estimated nutritional needs within the next 7 days.        Nutrition Monitoring and Evaluation:   Behavioral-Environmental Outcomes: None identified  Food/Nutrient Intake Outcomes: Diet advancement/tolerance, Food and nutrient intake, Supplement intake, IVF intake  Physical Signs/Symptoms Outcomes: Biochemical data, Chewing or swallowing, Meal time behavior, Nutrition focused physical findings    Discharge Planning:    No discharge needs at this time     Electronically signed by Gris Silva RD on 9/28/2021 at 2:50 PM    Contact Number: [phone]

## 2021-09-28 NOTE — HOME HEALTH
SUBJECTIVE: PCA notes patient has not been eating well since returning from the hospital. He has had about a half of boost and having difficulty with drinking it. PCA notes patient seems weaker than usual and has been lying in bed for the past few days. CAREGIVER INVOLVEMENT/ASSISTANCE NEEDED FOR: Patient has PCA 24/7 to assist with ADL's and transfers. HOME HEALTH SUPPLIES BY TYPE AND QUANTITY ORDERED/DELIVERED THIS VISIT INCLUDE: none    OBJECTIVE: See interventions. ASSESSMENT OF PROGRESS TOWARD GOALS: Patient with limited activity due to increase weakness and c/o dizziness during transfers. Patient transferred from sit<->supine with mod A x 2 and constant verbal/tactile cues for proper technique. Patient with difficulty following commands and staying on task. PCA educated on signs/sx associated with stroke and if any changes in sx to seek medical support immediately. CONTINUED NEED FOR THE FOLLOWING SKILLS: Continuation of PT to further improve patients sitting/standing balance to reduce fall risk. Patient would benefit from focus on gait training with walker. Patient needs reinforcement on safety with transfers. PLAN FOR NEXT VISIT: Attempt ambulation with walker next visit. THE FOLLOWING DISCHARGE PLANNING WAS DISCUSSED WITH THE PATIENT/CAREGIVER: D/C from HHPT in 2w3, 1w1 when goals are met or max potential benefit achieved.

## 2021-09-28 NOTE — PROGRESS NOTES
Discharge order noted for today. Pt has been accepted to [facility] agency. Spoke with patients girlfriend and their caregiver and are agreeable to the transition plan today. Transport has been arranged through [facility] for a 8:30pm pickup. Patient's discharge summary and home health  orders have been forwarded to [facility] agency via [facility]. Updated bedside RN, Miguelina Ordonez,  to the transition plan.   Discharge information has been documented on the AVS.       Zayra Zeng RN - Outcomes Manager  [phone]

## 2021-09-28 NOTE — PROGRESS NOTES
Shift summary Note:  Assumed care of patient in bed awake and quiet, able to take medication with applesauce. No s/s of acute distress. IV started and fluids restarted. Du draining cloudy yellow urine. Call bell within reach.   Patient Vitals for the past 12 hrs:   Temp Pulse Resp BP SpO2   09/28/21 0333 99.5 °F (37.5 °C) 89 16 103/60 96 %   09/27/21 2109 (!) 100.8 °F (38.2 °C) 83 18 (!) 116/56 99 %

## 2021-09-28 NOTE — HOSPICE
DME will be delivered by 515pm today. Caregivers advised pt can come home anytime after bed is delivered. She also advised all of the caregivers are certified med techs and have been managing the pt's medication and Ms. Lilli's medication for a while.       Dawood Pierre RN  [facility] [address] [facility], [address] [place] [address] Str.  [phone]  Email: [email or web address]

## 2021-09-28 NOTE — DISCHARGE SUMMARY
Discharge Summary    Patient: Asa Parsons               Sex: male          DOA: 9/25/2021         YOB: 1922      Age:  80 y.o.        LOS:  LOS: 2 days                Admit Date: 9/25/2021    Discharge Date: 9/28/2021    Primary care physician: Karly Benítez MD    Discharge Diagnoses:    1. Hip pain, this is likely musculoskeletal, no fracture on hip xray   2. Candida Acute cystitis with hematuria, associate with dove catheter usage   3. DALLIN on CKD 3  4. Chronic urinary retention with chronic dove cath   5. Recent C. Diff diarrhea   6. Failure to thrive with severe dysphagia   7. Age-related cognitive impairment   8. Debility and fall risk   9. Normocytic anemia      Discharge Condition: stable  Disposition: home with hospice   Code Status:*DNR/DNI    Follow up for Primary Care Physician:  1) he needs to finish 2 weeks of flucoazole per ID recommendation  2) he continue to finish his oral vancomycin for his recently diagnosed C. Diff colitis   3) he needs to continue maintain 1.5 liter oral fluid hydration daily   4)  He will transition in to hospice and transition toward comfort measure per hospice team     Hospital Course:   80 y.o male with failure to thrive, severe dysphagia, age-related cognitive impairment, recent hospitalization for urinary retention, C. Diff colitis, presented from home with lower legs pain, hip pain. There was no report of fall. He was seen in the ER to find no fracture on imaging. Further workup showed UTI and DALLIN on CKD. He was admitted for antibiotics. Urine culture grew Candida. ID consulted and started him on fluconazole. His IV antibiotics stopped. His renal function improved with IV hydration. MPOA accept transition toward hospice   Blood culture showed contamination growth. Repeat blood culture without growth     Last 24 Hours: he remains more comfortable. No pain complaint. Family await for DME at home before bringing him home today.  He has tolerate oral intake. ROS: limited but able to expressed no headache, no chest pain, no stomach pain, no diarrhea, still with hip pain    VS:   Visit Vitals  BP (!) 113/54   Pulse 86   Temp 99.5 °F (37.5 °C)   Resp 16   Ht 5' 10\" (1.778 m)   Wt 62.8 kg (138 lb 6.4 oz)   SpO2 97%   BMI 19.86 kg/m²      Tmax/24hrs: Temp (24hrs), Av.9 °F (37.7 °C), Min:99.5 °F (37.5 °C), Max:100.8 °F (38.2 °C)      Intake/Output Summary (Last 24 hours) at 2021  Last data filed at 2021 0617  Gross per 24 hour   Intake 240 ml   Output 500 ml   Net -260 ml       Tele:   General:  Cooperative, Not in acute distress  HEENT: PERRL, EOMI, supple neck, no JVD, dry oral mucosa  Cardiovascular: S1S2 regular, no rub/gallop   Pulmonary: air entry bilaterally, no wheezing, no crackle  GI:  Soft, non tender, non distended, +bs, no guarding   Extremities:  No pedal edema, +distal pulses appreciated   Neuro: Awake and answering slowly    Consults:   GABI Nix     Significant Diagnostic Studies:   XR Results (most recent):  Results from Hospital Encounter encounter on 21    XR HIP RT W OR WO PELV 2-3 VWS    Narrative  Hip series. CPT code: 84714    Clinical history:  With tenderness in the right hip. Technique: Two views of the hip. Comparison: No priors    Findings: Right hip is flexed for the exam. There are no fractures. Normal  alignment is seen at the hip. Bones are normally mineralized. There are mild  hypertrophic changes right hip. Pelvis is unremarkable. Impression  No evidence for hip fracture.         Lab/Data Review:  Labs: Results:       Chemistry Recent Labs     21  0335 21  0327   GLU 96 105*    139   K 4.5 4.6   * 106   CO2 24 26   BUN 58* 56*   CREA 2.54* 2.32*   CA 7.7* 7.8*   AGAP 7 7   BUCR 23* 24*      CBC w/Diff Recent Labs     21  0327   WBC 9.3   RBC 2.87*   HGB 8.4*   HCT 26.8*      GRANS 78*   LYMPH 12*   EOS 1      Coagulation No results for input(s): PTP, INR, APTT, INREXT in the last 72 hours. Iron/Ferritin No results for input(s): IRON in the last 72 hours. No lab exists for component: TIBCCALC   BNP No results for input(s): BNPP in the last 72 hours. Cardiac Enzymes No results for input(s): CPK, CKND1, MERCEDES in the last 72 hours. No lab exists for component: CKRMB, TROIP   Liver Enzymes No results for input(s): TP, ALB, TBIL, AP in the last 72 hours. No lab exists for component: SGOT, GPT, DBIL   Thyroid Studies No results for input(s): T4, T3U, TSH, TSHEXT in the last 72 hours.     No lab exists for component: T3RU       All Micro Results     Procedure Component Value Units Date/Time    CULTURE, BLOOD [[id number]]  (Abnormal) Collected: 09/25/21 1540    Order Status: Completed Specimen: Blood Updated: 09/28/21 0922     Special Requests: LEFT        GRAM STAIN       AEROBIC AND ANAEROBIC BOTTLES GRAM POSITIVE COCCI IN GROUPS                  CALLED TO AND CORRECTLY REPEATED BY: HANNARN 5S AT 0900 [id number] TO [facility]           Culture result:       STAPHYLOCOCCUS SPECIES, COAGULASE NEGATIVE GROWING IN THE AEROBIC AND ANAEROBIC BOTTLES (SITE = LAC)          CULTURE, BLOOD [[id number]] Collected: 09/26/21 1325    Order Status: Completed Specimen: Blood Updated: 09/28/21 0646     Special Requests: NO SPECIAL REQUESTS        Culture result: NO GROWTH 2 DAYS       CULTURE, BLOOD [[id number]] Collected: 09/25/21 1534    Order Status: Completed Specimen: Blood Updated: 09/28/21 0646     Special Requests: NO SPECIAL REQUESTS        Culture result: NO GROWTH 3 DAYS       CULTURE, URINE [[id number]]  (Abnormal) Collected: 09/25/21 1725    Order Status: Completed Specimen: Cath Urine Updated: 09/27/21 0946     Special Requests: NO SPECIAL REQUESTS        G[place]ier Count --        >100,000  COLONIES/mL       Culture result: AIME ALBICANS       COVID-19 RAPID TEST [[id number]]     Order Status: Canceled     COVID-19 RAPID TEST [[id number]] Collected: 09/25/21 1545    Order Status: Completed Specimen: Nasopharyngeal Updated: 09/25/21 1654     Specimen source Nasopharyngeal        COVID-19 rapid test Not detected        Comment: Rapid Abbott ID Now       Rapid NAAT:  The specimen is NEGATIVE for SARS-CoV-2, the novel coronavirus associated with COVID-19. Negative results should be treated as presumptive and, if inconsistent with clinical signs and symptoms or necessary for patient management, should be tested with an alternative molecular assay. Negative results do not preclude SARS-CoV-2 infection and should not be used as the sole basis for patient management decisions. This test has been authorized by the FDA under an Emergency Use Authorization (EUA) for use by authorized laboratories. Fact sheet for Healthcare Providers: ConventionToro Developmentdate.co.nz  Fact sheet for Patients: Cellfiredate.co.nz       Methodology: Isothermal Nucleic Acid Amplification                     Medications at discharge  including reasons for change and indications for new ones:   Current Discharge Medication List      START taking these medications    Details   acetaminophen (TYLENOL) 500 mg tablet Take 2 Tablets by mouth every eight (8) hours. Indications: fever  Qty: 90 Tablet, Refills: 0  Start date: 9/28/2021      fluconazole (DIFLUCAN) 200 mg tablet Take 1 Tablet by mouth daily for 7 days. FDA advises cautious prescribing of oral fluconazole in pregnancy. Qty: 7 Tablet, Refills: 0  Start date: 9/29/2021, End date: 10/6/2021      LORazepam (INTENSOL) 2 mg/mL concentrated solution Take 0.5 mL by mouth every one (1) hour as needed for Agitation, Anxiety, Restlessness or Shortness of Breath. Max Daily Amount: 24 mg.   Qty: 30 mL, Refills: 0  Start date: 9/28/2021    Associated Diagnoses: Hospice care      morphine (ROXANOL) 100 mg/5 mL (20 mg/mL) concentrated solution Take 0.5 mL by mouth every one (1) hour as needed for Pain or Shortness of Breath for up to 3 days. Max Daily Amount: 240 mg.  Qty: 30 mL, Refills: 0  Start date: 9/28/2021, End date: 10/1/2021    Associated Diagnoses: Hospice care         CONTINUE these medications which have NOT CHANGED    Details   vancomycin 50 mg/mL oral solution (compounded) Take 2.5 mL by mouth every six (6) hours for 10 days. Qty: 100 mL, Refills: 0      L. acidophilus,casei,rhamnosus (BIO-K PLUS) 50 billion cell cpDR capsule Take 1 Capsule by mouth daily for 17 days.   Qty: 17 Capsule, Refills: 0      propranoloL (INDERAL) 10 mg tablet take 1 tablet by mouth twice a day  Qty: 180 Tab, Refills: 3         STOP taking these medications       finasteride (PROSCAR) 5 mg tablet Comments:   Reason for Stopping:         tamsulosin (FLOMAX) 0.4 mg capsule Comments:   Reason for Stopping:         pantoprazole (PROTONIX) 40 mg tablet Comments:   Reason for Stopping:                       Pending laboratory work and tests: repeat culture     Activity: Activity as tolerated    Diet: Comfort feeding    Wound Care: Keep wound clean and dry      Time spent >30 minutes  Orlin Pickard MD  9/28/2021  8:04 PM

## 2021-09-28 NOTE — PROGRESS NOTES
Reason for Admission:  UTI (urinary tract infection) [N39.0]                 RUR Score:    21%            Plan for utilizing home health:    no                      Likelihood of Readmission:   Moderate                         Do you (patient/family) have any concerns for transition/discharge?  no    Transition of Care Plan:       Initial assessment completed with caregiver / friend. Cognitive status of patient: not assessed. Face sheet information confirmed:  yes. The patient designates girlfriend, Howie Monteiro to participate in his discharge plan and to receive any needed information. This patient lives in a single family home with girlfriend with 24/7 caregivers. Patient is not able to navigate steps as needed. Prior to hospitalization, patient was considered to be independent with ADLs/IADLS : no . If not independent,  patient needs assist with : dressing, bathing, food preparation, cooking, toileting, grooming and self feeding    Patient has a current ACP document on file: yes      Healthcare Decision Maker:   Primary Decision Maker: Yarelisjama Faina - Girlfrienjuan francisco - [phone]    Secondary Decision Maker: Dolores Mulligan - Next of Kin - [phone]    Click here to complete [place] including selection of the Healthcare Decision Maker Relationship (ie \"Primary\")    The patient will need stretcher transport home upon discharge. The patient has a wheelchair but will need a hospital bed delivered. Patient is currently active with home health. If active, [facility]. Patient has stayed in a skilled nursing facility or rehab. Was  stay within last 60 days : no. This patient is on dialysis :no    Currently, the discharge plan is Hospice. The patient states that he can obtain his medications from the pharmacy, and take his medications as directed. Patient's current insurance is Medicare and BC. Care Management Interventions  PCP Verified by CM:  Yes  Mode of Transport at Discharge: BLS  Transition of Care Consult (CM Consult): Discharge Planning, [place]: Yes  Support Systems: Spouse/Significant Other, Caregiver/Home Care Staff  Confirm Follow Up Transport: Family  Discharge Location  Discharge Placement: Home with hospice        Ton Ambrocio RN - Outcomes Manager  [phone]

## 2021-09-28 NOTE — PROGRESS NOTES
Palliative Medicine    Palliative Medicine team Kenia Martinez NP and Deangelo Farr RN met at the pt's bedside. Pt's caregiver was also present. Pt was lying in the bed asleep. He did briefly open his eyes to verbal stimuli. He appeared to be comfortable. Caregiver reports that he ate a significant portion of his breakfast. Pt does not appear to be SOB or in distress at this time. Pt remains DNR/DNI. Potential dispo plan is home this evening with the support of hospice. Thank you for the Palliative Medicine consult and allowing us to participate in the care of Mr. Aguirre. Will continue to monitor and provide support.     Deangelo Farr RN, BSN  Palliative Medicine Inpatient RN  [facility]  Palliative [facility] Line: [phone]

## 2021-09-28 NOTE — PROGRESS NOTES
Spoke with Magdalene Ennis from [facility]. She asked for this CM to contact family about their plans for patients discharge. Called patients girlfriend, Isabelle Santos and spoke with her and her caregiver Juan. Jessica Luna verified that patient and Jonelle Martínez have 24hr caregivers. They are requesting a hospital bed and a tray table. Let them know hospice would be contacting them today to make arrangements. Spoke with Magdalene Ennis and asked her to speak with Jessica Luna and Jonelle Martínez. If equipment can be delivered this afternoon, pt can discharge later tonight or tomorrow morning.   Belén Vanegas RN - Outcomes Manager  [phone]

## 2021-09-28 NOTE — PROGRESS NOTES
Chart reviewed. Tm:100. 2. hemodynamically stable. Labs reviewed. Blood cx 9/26- no growth. Worsening creatinine. A/p:  80year-old gentleman with a history of anxiety, hypertension, diverticulitis, osteoarthritis, and BPH presented to emergency room on 9/25/2021 with hip pain.         BPH and outlet obstruction s/p Du catheter placed on 9/10/2021     Hospitalization at [facility] 9/17/21-9/21/21 for acute urinary retention, probable cystitis, recent c.diff colitis     Negative urine cultures 9/17 could be due to prior antibiotics.  Significant pyuria noted on urine analysis 9/17, 3+ bacteria, urinary retention concerning for cystitis.     Now with fever with tm:101.3 on 9/27, candida in urine, coagulase negative staph bacteremia, worsening renal function     Patient presents with a highly complex clinical picture. Exact etiology of fever not entirely clear at this time. It could be multifactorial-secondary to dehydration, Candida cystitis, ? Silent aspiration     Single positive blood culture for coagulase-negative Staphylococcus on 9/25/2021-likely contaminant     Recent history of C. Difficile-positive C. difficile PCR 9/10/2021.  Status post metronidazole 9/11-9/16, status post oral vancomycin since 9/20/2021 Resolved diarrhea      Probable Candida cystitis-significant pyuria noted on urine analysis 9/25 along with 4+ yeast, fever concerning for evolving Candida cystitis     Will attempt to avoid prolonged antibiotic for probable cystitis since recent history of C. Difficile, risk of recurrence with prolonged antibiotic use    D/w dr. Meliton Chapin. Plan to discharge patient home on hospice. Hence, will not escalate care     Recommendations:     1.  continue oral vancomycin 125 mg oral every 6 hours till 9/30/21  2. continue po diflucan x 2 weeks  3.  await transition to hospice  4. Recommend tylenol po prn for fever     Time spent > 10 min. Please call me if any further questions or concerns. D/w dr. Meliton Chapin.

## 2021-09-28 NOTE — HOME HEALTH
Caregiver involvement: Paid HHA is caregiver, she  Assists with ADLs, Medication management, Transportation to appointments, Meal prep and assists with ambulation. Medications reconciled and all medications are available in the home this visit. Medications  are somewhat effective at this time. Home health supplies by type and quantity ordered/delivered this visit include: n/a    Patient education provided this visit:Went over the care of a dove catheter, How to empty drainage bag and to observe for changes in color or clarity of urine. Also the importance of avoiding dependent looping of tubing and to keep drainage bag below level of the bladder. Patient is a fall risk, went over the need of having someone with him when ambulating, keep hallways and living areas free of clutter and throw rugs. Progress toward goals: urine output is clear with sediment. Patient yells out in pain when lightly touched or turning is attempted. Caregiver is calling 911 for ambulance. She states this behavior is not his normal.      Home exercise program: Discussed the need for keeping skin clean and dry, using a zinc product on bottom to prevent pressure injuries as well as turning patient q2 hours as able. Discussed s/s of infection to monitor for, s/s of UTI, who to report to/when. Instructed cg to notify staff/md/seek tx if complications occur. Patient instructed to maintain clear pathways in home and to minimize clutter to prevent falls from occurring/minimize fall potential.   Patient needing a well balanced diet with the 5 food groups, patient to increase fiber in diet, fresh fruits and vegetables, whole grains and increase water intake. Maintain healthy  diet, observe for signs of infection. I went over the importance of taking all prescriptions as ordered. We discussed the signs of infection and when to call MD.  We discussed the high risk for falls and ways to prevent falls in the future.   We also discussed the need of a healthy diet, and the need to change positions frequently. Continued need for the following skills: Nursing, Physical Therapy and Occupational Therapy    The following discharge planning was discussed with the pt/caregiver: Will discharge from nursing when education is complete and patient is medically stable.

## 2021-09-29 NOTE — PROGRESS NOTES
Brief Progress    Received call from nursing. Patient d/c'd today. Transport could not come until midnight and was therefore cancelled earlier today. Transport has been rescheduled for 9am tomorrow morning.     Amber Alfaro DO  09/28/21  9:38 PM

## 2021-09-29 NOTE — PROGRESS NOTES
I have reviewed discharge  instruction with the patient/family. The family verbalized understanding. Discharged medications reviewed with patient and appropriate educational materials and adverse effects of medications teaching were provided. Patient's VS was stable. Patient armband was removed and shredded. Patient is cleared for discharge. Patient picked up by [facility] transport, all belongings sent with patient. Medications and discharge instructions sent with patient. Family notified that patient is on the way home.

## 2021-09-29 NOTE — PROGRESS NOTES
2138  Spoke to , Arnie Horner to verify the time the patient is going to  [facility].  called back and stated that it is not until late midnight tonight and she will cancel the transport and have the patient  tomorrow at [facility].  will notify the hospice care about the plan. 2140  Called hospitalist and notify about the cancellation of patient transport tonight. Per Dr. Mckenzie Stark., continue all patient's meds tonight. Primary nurse made aware about the above situation and she will notify the family member.

## 2021-09-29 NOTE — DISCHARGE INSTRUCTIONS
Discharge Instructions    Patient: Alvaro Franz MRN: [id number]  CSN: [id number]    YOB: 1922  Age: 80 y.o. Sex: male    DOA: 9/25/2021 LOS:  LOS: 2 days   Discharge Date:      ACUTE DIAGNOSES:  1. Hip pain, this is likely musculoskeletal, no fracture on hip xray   2. Candida Acute cystitis with hematuria, associate with dove catheter usage   3. DALLIN on CKD 3  4. Chronic urinary retention with chronic dove cath   5. Recent C. Diff diarrhea   6. Failure to thrive with severe dysphagia   7. Age-related cognitive impairment   8. Debility and fall risk   9. Normocytic anemia          DISCHARGE MEDICATIONS:       · It is important that you take the medication exactly as they are prescribed. · Keep your medication in the bottles provided by the pharmacist and keep a list of the medication names, dosages, and times to be taken in your wallet. · Do not take other medications without consulting your doctor. DIET:  Comfort feeding    ACTIVITY: Activity as tolerated, fall precaution     ADDITIONAL INFORMATION: If you experience any of the following symptoms then please call your primary care physician or return to the emergency room if you cannot get hold of your doctor: Fever, chills, nausea, vomiting, diarrhea, change in mentation, falling, bleeding, shortness of breath. FOLLOW UP CARE:  Dr. Yessica Carlson MD  you are to call and set up an appointment to see them in 2 weeks. Follow-up with *Home hospice for comfort care       Information obtained by :  I understand that if any problems occur once I am at home I am to contact my physician. I understand and acknowledge receipt of the instructions indicated above.                                                                                                                                            Physician's or R.N.'s Signature                                                                  Date/Time Patient or Representative Signature                                                          Date/Time    Carlos Bonilla MD  9/28/2021  8:05 PM

## 2021-09-29 NOTE — PROGRESS NOTES
Discharge/Transition Planning    Received page that transport had not arrived. Called [facility] and notified transport running very behind and would be [phone] likely before arrival but could be later. Given age and pt going with hospice, opted to change to morning transport to 9am in morning. Called [address] floor and notified of change . RN will notify family/caregiver. See that Dr Pineda has note in and notified. [address] [place] [address] Street [phone] and spoke with call center and she will notify nurse of change.   PCS will get changed in morning

## 2021-09-29 NOTE — PROGRESS NOTES
Problem: Pressure Injury - Risk of  Goal: *Prevention of pressure injury  Description: Document Joe Scale and appropriate interventions in the flowsheet. Outcome: Progressing Towards Goal  Note: Pressure Injury Interventions:  Sensory Interventions: Avoid rigorous massage over bony prominences    Moisture Interventions: Absorbent underpads, Minimize layers    Activity Interventions: Pressure redistribution bed/mattress(bed type)    Mobility Interventions: Pressure redistribution bed/mattress (bed type)    Nutrition Interventions: Document food/fluid/supplement intake    Friction and Shear Interventions: Apply protective barrier, creams and emollients, Foam dressings/transparent film/skin sealants, Minimize layers                Problem: Patient Education: Go to Patient Education Activity  Goal: Patient/Family Education  Outcome: Progressing Towards Goal     Problem: Falls - Risk of  Goal: *Absence of Falls  Description: Document Miroslava Fall Risk and appropriate interventions in the flowsheet.   Outcome: Progressing Towards Goal  Note: Fall Risk Interventions:       Mentation Interventions: Adequate sleep, hydration, pain control, Door open when patient unattended    Medication Interventions: Bed/chair exit alarm    Elimination Interventions: Call light in reach, Bed/chair exit alarm    History of Falls Interventions: Bed/chair exit alarm, Door open when patient unattended         Problem: Patient Education: Go to Patient Education Activity  Goal: Patient/Family Education  Outcome: Progressing Towards Goal     Problem: Pain  Goal: *Control of Pain  Outcome: Progressing Towards Goal     Problem: Patient Education: Go to Patient Education Activity  Goal: Patient/Family Education  Outcome: Progressing Towards Goal     Problem: Altered Thought Process (Adult/Pediatric)  Goal: *STG: Participates in treatment plan  Outcome: Progressing Towards Goal  Goal: *STG: Remains safe in hospital  Outcome: Progressing Towards Goal  Goal: *STG: Seeks staff when feelings of anxiety and fear arise  Outcome: Progressing Towards Goal  Goal: *STG: Complies with medication therapy  Outcome: Progressing Towards Goal  Goal: *STG: Attends activities and groups  Outcome: Progressing Towards Goal  Goal: *STG: Decreased delusional thinking  Outcome: Progressing Towards Goal  Goal: *STG: Decreased hallucinations  Outcome: Progressing Towards Goal  Goal: *STG: Absence of lethality  Outcome: Progressing Towards Goal  Goal: *STG: Demonstrates ability to understand and use improved judgment in daily activities and relationships  Outcome: Progressing Towards Goal  Goal: *LTG: Returns to baseline functioning  Outcome: Progressing Towards Goal  Goal: Interventions  Outcome: Progressing Towards Goal     Problem: Patient Education: Go to Patient Education Activity  Goal: Patient/Family Education  Outcome: Progressing Towards Goal     Problem: Pressure Injury - Risk of  Goal: *Prevention of pressure injury  Description: Document Joe Scale and appropriate interventions in the flowsheet.   Outcome: Progressing Towards Goal  Note: Pressure Injury Interventions:  Sensory Interventions: Avoid rigorous massage over bony prominences    Moisture Interventions: Absorbent underpads, Minimize layers    Activity Interventions: Pressure redistribution bed/mattress(bed type)    Mobility Interventions: Pressure redistribution bed/mattress (bed type)    Nutrition Interventions: Document food/fluid/supplement intake    Friction and Shear Interventions: Apply protective barrier, creams and emollients, Foam dressings/transparent film/skin sealants, Minimize layers                Problem: Patient Education: Go to Patient Education Activity  Goal: Patient/Family Education  Outcome: Progressing Towards Goal     Problem: Impaired Skin Integrity/Pressure Injury Treatment  Goal: *Improvement of Existing Pressure Injury  Outcome: Progressing Towards Goal  Goal: *Prevention of pressure injury  Description: Document Joe Scale and appropriate interventions in the flowsheet. Outcome: Progressing Towards Goal  Note: Pressure Injury Interventions:  Sensory Interventions: Avoid rigorous massage over bony prominences    Moisture Interventions: Absorbent underpads, Minimize layers    Activity Interventions: Pressure redistribution bed/mattress(bed type)    Mobility Interventions: Pressure redistribution bed/mattress (bed type)    Nutrition Interventions: Document food/fluid/supplement intake    Friction and Shear Interventions: Apply protective barrier, creams and emollients, Foam dressings/transparent film/skin sealants, Minimize layers                Problem: Patient Education: Go to Patient Education Activity  Goal: Patient/Family Education  Outcome: Progressing Towards Goal     Problem: Patient Education: Go to Patient Education Activity  Goal: Patient/Family Education  Outcome: Progressing Towards Goal     Problem: Nutrition Deficit  Goal: *Optimize nutritional status  Outcome: Progressing Towards Goal     Problem: Risk for Spread of Infection  Goal: Prevent transmission of infectious organism to others  Description: Prevent the transmission of infectious organisms to other patients, staff members, and visitors.   Outcome: Progressing Towards Goal     Problem: Patient Education:  Go to Education Activity  Goal: Patient/Family Education  Outcome: Progressing Towards Goal

## 2021-09-29 NOTE — PROGRESS NOTES
Called [facility]; ambulance is about 5 minutes away. Spoke with Edward Hurt from hospice to update. Left message for patients girlfriend to update on discharge time.   Minerva Horton RN - Outcomes Manager  [phone]

## 2021-09-29 NOTE — PROGRESS NOTES
Assumed care of patient in bed . aiting for transport.  notified of delay in pick p so transport moved to morning Family notified of change in discharge. Patient status unchanged, awake and quiet, VSS, call bell within reach.   Patient Vitals for the past 12 hrs:   Temp Pulse Resp BP SpO2   09/29/21 0334 98.7 °F (37.1 °C) 77 16 103/65 98 %   09/28/21 2051 98 °F (36.7 °C) 86 16 108/62 100 %

## 2021-09-29 NOTE — PROGRESS NOTES
Problem: Pressure Injury - Risk of  Goal: *Prevention of pressure injury  Description: Document Joe Scale and appropriate interventions in the flowsheet. Outcome: Progressing Towards Goal  Note: Pressure Injury Interventions:  Sensory Interventions: Discuss PT/OT consult with provider, Keep linens dry and wrinkle-free, Minimize linen layers    Moisture Interventions: Absorbent underpads, Minimize layers    Activity Interventions: Pressure redistribution bed/mattress(bed type)    Mobility Interventions: Pressure redistribution bed/mattress (bed type)    Nutrition Interventions: Document food/fluid/supplement intake    Friction and Shear Interventions: Apply protective barrier, creams and emollients, Minimize layers                Problem: Patient Education: Go to Patient Education Activity  Goal: Patient/Family Education  Outcome: Progressing Towards Goal     Problem: Falls - Risk of  Goal: *Absence of Falls  Description: Document Miroslava Fall Risk and appropriate interventions in the flowsheet.   Outcome: Progressing Towards Goal  Note: Fall Risk Interventions:       Mentation Interventions: Adequate sleep, hydration, pain control, Door open when patient unattended, Family/sitter at bedside    Medication Interventions: Bed/chair exit alarm    Elimination Interventions: Call light in reach, Patient to call for help with toileting needs    History of Falls Interventions: Bed/chair exit alarm, Investigate reason for fall         Problem: Patient Education: Go to Patient Education Activity  Goal: Patient/Family Education  Outcome: Progressing Towards Goal     Problem: Pain  Goal: *Control of Pain  Outcome: Progressing Towards Goal     Problem: Patient Education: Go to Patient Education Activity  Goal: Patient/Family Education  Outcome: Progressing Towards Goal     Problem: Altered Thought Process (Adult/Pediatric)  Goal: *STG: Participates in treatment plan  Outcome: Progressing Towards Goal  Goal: *STG: Remains safe in hospital  Outcome: Progressing Towards Goal  Goal: *STG: Seeks staff when feelings of anxiety and fear arise  Outcome: Progressing Towards Goal  Goal: *STG: Complies with medication therapy  Outcome: Progressing Towards Goal  Goal: *STG: Attends activities and groups  Outcome: Progressing Towards Goal  Goal: *STG: Decreased delusional thinking  Outcome: Progressing Towards Goal  Goal: *STG: Decreased hallucinations  Outcome: Progressing Towards Goal  Goal: *STG: Absence of lethality  Outcome: Progressing Towards Goal  Goal: *STG: Demonstrates ability to understand and use improved judgment in daily activities and relationships  Outcome: Progressing Towards Goal  Goal: *LTG: Returns to baseline functioning  Outcome: Progressing Towards Goal  Goal: Interventions  Outcome: Progressing Towards Goal     Problem: Patient Education: Go to Patient Education Activity  Goal: Patient/Family Education  Outcome: Progressing Towards Goal     Problem: Pressure Injury - Risk of  Goal: *Prevention of pressure injury  Description: Document Joe Scale and appropriate interventions in the flowsheet.   Outcome: Progressing Towards Goal  Note: Pressure Injury Interventions:  Sensory Interventions: Discuss PT/OT consult with provider, Keep linens dry and wrinkle-free, Minimize linen layers    Moisture Interventions: Absorbent underpads, Minimize layers    Activity Interventions: Pressure redistribution bed/mattress(bed type)    Mobility Interventions: Pressure redistribution bed/mattress (bed type)    Nutrition Interventions: Document food/fluid/supplement intake    Friction and Shear Interventions: Apply protective barrier, creams and emollients, Minimize layers                Problem: Patient Education: Go to Patient Education Activity  Goal: Patient/Family Education  Outcome: Progressing Towards Goal     Problem: Impaired Skin Integrity/Pressure Injury Treatment  Goal: *Improvement of Existing Pressure Injury  Outcome: Progressing Towards Goal  Goal: *Prevention of pressure injury  Description: Document Joe Scale and appropriate interventions in the flowsheet. Outcome: Progressing Towards Goal     Problem: Patient Education: Go to Patient Education Activity  Goal: Patient/Family Education  Outcome: Progressing Towards Goal     Problem: Patient Education: Go to Patient Education Activity  Goal: Patient/Family Education  Outcome: Progressing Towards Goal     Problem: Nutrition Deficit  Goal: *Optimize nutritional status  Outcome: Progressing Towards Goal     Problem: Risk for Spread of Infection  Goal: Prevent transmission of infectious organism to others  Description: Prevent the transmission of infectious organisms to other patients, staff members, and visitors.   Outcome: Progressing Towards Goal     Problem: Patient Education:  Go to Education Activity  Goal: Patient/Family Education  Outcome: Progressing Towards Goal

## 2021-09-29 NOTE — ROUTINE PROCESS
Bedside and verbal shift change report given to Alejandra Patel by Sonam Franks RN.  Report included the following information:  -procedure summary  -MAR  -Recent Results  -Med Rec Status  -SBAR

## 2021-09-29 NOTE — PROGRESS NOTES
Problem: Pressure Injury - Risk of  Goal: *Prevention of pressure injury  Description: Document Joe Scale and appropriate interventions in the flowsheet. 9/29/2021 1039 by Vicki Shepherd RN  Outcome: Resolved/Met  9/29/2021 1034 by Vicki Shepherd RN  Outcome: Progressing Towards Goal  Note: Pressure Injury Interventions:  Sensory Interventions: Avoid rigorous massage over bony prominences    Moisture Interventions: Absorbent underpads, Minimize layers    Activity Interventions: Pressure redistribution bed/mattress(bed type)    Mobility Interventions: Pressure redistribution bed/mattress (bed type)    Nutrition Interventions: Document food/fluid/supplement intake    Friction and Shear Interventions: Apply protective barrier, creams and emollients, Foam dressings/transparent film/skin sealants, Minimize layers                Problem: Patient Education: Go to Patient Education Activity  Goal: Patient/Family Education  9/29/2021 1039 by Vicki Shepherd RN  Outcome: Resolved/Met  9/29/2021 1034 by Vicki Shepherd RN  Outcome: Progressing Towards Goal     Problem: Falls - Risk of  Goal: *Absence of Falls  Description: Document Miroslava Fall Risk and appropriate interventions in the flowsheet.   9/29/2021 1039 by Vicki Shepherd RN  Outcome: Resolved/Met  9/29/2021 1034 by Vicki Shepherd RN  Outcome: Progressing Towards Goal  Note: Fall Risk Interventions:       Mentation Interventions: Adequate sleep, hydration, pain control, Door open when patient unattended    Medication Interventions: Bed/chair exit alarm    Elimination Interventions: Call light in reach, Bed/chair exit alarm    History of Falls Interventions: Bed/chair exit alarm, Door open when patient unattended         Problem: Patient Education: Go to Patient Education Activity  Goal: Patient/Family Education  9/29/2021 1039 by Vicki Shepherd RN  Outcome: Resolved/Met  9/29/2021 1034 by Vicki Shepherd RN  Outcome: Progressing Towards Goal     Problem: Pain  Goal: *Control of Pain  9/29/2021 1039 by Barrett Ulloa RN  Outcome: Resolved/Met  9/29/2021 1034 by Barrett Ulloa RN  Outcome: Progressing Towards Goal     Problem: Patient Education: Go to Patient Education Activity  Goal: Patient/Family Education  9/29/2021 1039 by Barrett Ulloa RN  Outcome: Resolved/Met  9/29/2021 1034 by Barrett Ulloa RN  Outcome: Progressing Towards Goal     Problem: Altered Thought Process (Adult/Pediatric)  Goal: *STG: Participates in treatment plan  9/29/2021 1039 by Barrett Ulloa RN  Outcome: Resolved/Met  9/29/2021 1034 by Barrett Ulloa RN  Outcome: Progressing Towards Goal  Goal: *STG: Remains safe in hospital  9/29/2021 1039 by Barrett Ulloa RN  Outcome: Resolved/Met  9/29/2021 1034 by Barrett Ulloa RN  Outcome: Progressing Towards Goal  Goal: *STG: Seeks staff when feelings of anxiety and fear arise  9/29/2021 1039 by Barrett Ulloa RN  Outcome: Resolved/Met  9/29/2021 1034 by Barrett Ulloa RN  Outcome: Progressing Towards Goal  Goal: *STG: Complies with medication therapy  9/29/2021 1039 by Brarett Ulloa RN  Outcome: Resolved/Met  9/29/2021 1034 by Barrett Ulloa RN  Outcome: Progressing Towards Goal  Goal: *STG: Attends activities and groups  9/29/2021 1039 by Barrett Ulloa RN  Outcome: Resolved/Met  9/29/2021 1034 by Barrett Ulloa RN  Outcome: Progressing Towards Goal  Goal: *STG: Decreased delusional thinking  9/29/2021 1039 by Barrett Ulloa RN  Outcome: Resolved/Met  9/29/2021 1034 by Barrett Ulloa RN  Outcome: Progressing Towards Goal  Goal: *STG: Decreased hallucinations  9/29/2021 1039 by Barrett Ulloa RN  Outcome: Resolved/Met  9/29/2021 1034 by Barrett Ulloa RN  Outcome: Progressing Towards Goal  Goal: *STG: Absence of lethality  9/29/2021 1039 by Barrett Ulloa RN  Outcome: Resolved/Met  9/29/2021 1034 by Aliza Benites RN  Outcome: Progressing Towards Goal  Goal: *STG: Demonstrates ability to understand and use improved judgment in daily activities and relationships  9/29/2021 1039 by Aliza Benites RN  Outcome: Resolved/Met  9/29/2021 1034 by Aliza Benites RN  Outcome: Progressing Towards Goal  Goal: *LTG: Returns to baseline functioning  9/29/2021 1039 by Aliza Benites RN  Outcome: Resolved/Met  9/29/2021 1034 by Aliza Benites RN  Outcome: Progressing Towards Goal  Goal: Interventions  9/29/2021 1039 by Aliza Benites RN  Outcome: Resolved/Met  9/29/2021 1034 by Aliza Benites RN  Outcome: Progressing Towards Goal     Problem: Patient Education: Go to Patient Education Activity  Goal: Patient/Family Education  9/29/2021 1039 by Aliza Benites RN  Outcome: Resolved/Met  9/29/2021 1034 by Aliza Benites RN  Outcome: Progressing Towards Goal     Problem: Pressure Injury - Risk of  Goal: *Prevention of pressure injury  Description: Document Joe Scale and appropriate interventions in the flowsheet.   9/29/2021 1039 by Aliza Benites RN  Outcome: Resolved/Met  9/29/2021 1034 by Aliza Benites RN  Outcome: Progressing Towards Goal  Note: Pressure Injury Interventions:  Sensory Interventions: Avoid rigorous massage over bony prominences    Moisture Interventions: Absorbent underpads, Minimize layers    Activity Interventions: Pressure redistribution bed/mattress(bed type)    Mobility Interventions: Pressure redistribution bed/mattress (bed type)    Nutrition Interventions: Document food/fluid/supplement intake    Friction and Shear Interventions: Apply protective barrier, creams and emollients, Foam dressings/transparent film/skin sealants, Minimize layers                Problem: Patient Education: Go to Patient Education Activity  Goal: Patient/Family Education  9/29/2021 1039 by Ally Guerrero Nidhi Spivey RN  Outcome: Resolved/Met  9/29/2021 1034 by Monique Rudd RN  Outcome: Progressing Towards Goal     Problem: Impaired Skin Integrity/Pressure Injury Treatment  Goal: *Improvement of Existing Pressure Injury  9/29/2021 1039 by Monique Rudd RN  Outcome: Resolved/Met  9/29/2021 1034 by Monique Rudd RN  Outcome: Progressing Towards Goal  Goal: *Prevention of pressure injury  Description: Document Jeo Scale and appropriate interventions in the flowsheet.   9/29/2021 1039 by Monique Rudd RN  Outcome: Resolved/Met  9/29/2021 1034 by Monique Rudd RN  Outcome: Progressing Towards Goal  Note: Pressure Injury Interventions:  Sensory Interventions: Avoid rigorous massage over bony prominences    Moisture Interventions: Absorbent underpads, Minimize layers    Activity Interventions: Pressure redistribution bed/mattress(bed type)    Mobility Interventions: Pressure redistribution bed/mattress (bed type)    Nutrition Interventions: Document food/fluid/supplement intake    Friction and Shear Interventions: Apply protective barrier, creams and emollients, Foam dressings/transparent film/skin sealants, Minimize layers                Problem: Patient Education: Go to Patient Education Activity  Goal: Patient/Family Education  9/29/2021 1039 by Monique Rudd RN  Outcome: Resolved/Met  9/29/2021 1034 by Monique Rudd RN  Outcome: Progressing Towards Goal     Problem: Patient Education: Go to Patient Education Activity  Goal: Patient/Family Education  9/29/2021 1039 by Monique Rudd RN  Outcome: Resolved/Met  9/29/2021 1034 by Monique Rudd RN  Outcome: Progressing Towards Goal     Problem: Nutrition Deficit  Goal: *Optimize nutritional status  9/29/2021 1039 by Monique Rudd RN  Outcome: Resolved/Met  9/29/2021 1034 by Monique Rudd RN  Outcome: Progressing Towards Goal     Problem: Risk for Spread of Infection  Goal: Prevent transmission of infectious organism to others  Description: Prevent the transmission of infectious organisms to other patients, staff members, and visitors.   9/29/2021 1039 by Roxana Cannon RN  Outcome: Resolved/Met  9/29/2021 1034 by Roxana Cannon RN  Outcome: Progressing Towards Goal     Problem: Patient Education:  Go to Education Activity  Goal: Patient/Family Education  9/29/2021 1039 by Roxana Cannon RN  Outcome: Resolved/Met  9/29/2021 1034 by Roxana Cannon RN  Outcome: Progressing Towards Goal

## 2021-10-01 NOTE — HOSPICE
Hospice Admission Summary  Evert Knapp : 1922  80 y.o male with failure to thrive, severe dysphagia, age-related cognitive impairment, recent hospitalization for urinary retention, C. Diff colitis, presented from home with lower legs pain, hip pain. There was no report of fall. He was seen in the ER to find no fracture on imaging. Further workup showed UTI and DALLIN on CKD. He was admitted for antibiotics and IV fluids. Patient is lethargic, responds to tactile stimuli. He is completely dependent for all ADL's. He lives in his own home with his life partner of 39 years and 24/7 paid caregiving. Patient has had decreased appetite - now not tolerating any PO intake due to increased lethargy and weakness. He is bedbound with multiple pressure injuries present. Patient has elected hospice services and is no longer seeking aggressive treatment. Co-morbidities related to the terminal diagnosis: debility, CKD, BPH  The patient and family is present and willing and safely able to provide care and administer medications, their availability is 24/7. The patient/family/caregiver/facility participated in goal setting, care planning, and are agreeable to the care plan. Admission booklet reviewed with patient/family/caregiver/facility; services provided under hospice benefit, review of rights and responsibilities, disposal of medications, contact information for , Joint Commission, Medicare, O, and outside resources for independence. The patient/family/caregiver/facility educated on IDT and their right to attend meetings. Education provided regarding 24-hour availability of hospice services and on-call number provided.     Attending physician:  Dr. Foreign Santamaria Director:  Dr. Dheeraj Shore:  DNR/DNI - POST   Allergies:  NKDA  MAC:  20.8cm  Height:   Weight:    PPS:  10%  FAST:  7E  NYHA:  N/A   EF%:  N/A   Tobacco usage:  N/A  Functional status:  bed bound/ completely dependent  Infection: CDIFF, UTI  Pain:  mild -  medications include PRN morphine and lorazepam  Bowel Regimen:  PRN Dulcolax suppositories  Lines, Drains, or Airways present: Du catheter  Wounds present:  stage 2 upper back, stage 2 sacral ulcer, stage 2 left hip, stage 1 right hip, unstageable right heel, unstageable left heel, See wound care plan for intervention orders  Symptoms to monitor to maintain comfort:  swelling, shortness of breath, UTI symptoms, pain, anxiety  Hospice Aide Services Requested:   Volunteer Services Requested:  n/a  Bereavement risk/contact:  low bereavement  Patient/family/caregiver specific end of life goal:  Patient wishes to play [employer] one more time. Family and patient verbalize importance of supportive care for comfort vs aggressive care. Training and education provided this visit:  Hospice philosophy  Plan for next visit: reinforce hospice philosophy, assess pain, assess grief responses. Care coordination with Medical Director, BREEZY Maldonado, life partner of 39 years, Brenda Swan. regarding admission to hospice and all are in agreement with plan of care.

## 2021-10-02 VITALS — TEMPERATURE: 100.3 F | OXYGEN SATURATION: 89 % | HEART RATE: 105 BPM | RESPIRATION RATE: 36 BRPM

## 2021-10-02 LAB
BACTERIA SPEC CULT: NORMAL
SERVICE CMNT-IMP: NORMAL

## 2021-10-02 PROCEDURE — 0651 HSPC ROUTINE HOME CARE

## 2021-10-03 DIAGNOSIS — Z51.5 HOSPICE CARE PATIENT: Primary | ICD-10-CM

## 2021-10-03 PROCEDURE — 0651 HSPC ROUTINE HOME CARE

## 2021-10-03 RX ORDER — LORAZEPAM 2 MG/ML
.5-2 CONCENTRATE ORAL
Qty: 30 ML | Refills: 0 | OUTPATIENT
Start: 2021-10-03

## 2021-10-03 RX ORDER — MORPHINE SULFATE 20 MG/ML
.25-1 SOLUTION ORAL
Qty: 30 ML | Refills: 0 | OUTPATIENT
Start: 2021-10-03 | End: 2021-10-06

## 2021-10-04 NOTE — HOSPICE
Patient pronounced at [id number]. Pronouncement of death completed by: LEIGH Cortes, RN. Agency staff was not present at the time of death. At the time of death prononouncement the patient was documented apneic, pulseless, blood pressure absent. The patient  within his home. The following were notified of the patient's death: girlfriend at bedside, daughter Ximena Stalls and care staff. Medications were disposed of per [facility] protocol. Medication wasted with Atilio House, Caregiver:  24mL Ativan, 24mL Morphine. Writer educated family about returning medications to local pharmacy drop box for proper disposal.    Postmortem care provided to patient by paid caregivers. Family did not actively and willingly participated in care. Patient and families postmortem Anab[employer]ist and cultural wishes maintained. The following patient belongings stayed in the home with patient family. The following were removed from patient body: None. Eyes gently closed. Soft rolled towel placed under patient chin. HOB raised to 30 degrees. Encouraged family/caregiver to spend time with  patient. Provided emotional support. This clinician did/did not stay with family/caregiver until clergy or  home arrived. Family/Caregiver is appropriately/inappropriately coping/grieving currently. [facility]  home picked up remains.

## 2021-10-07 NOTE — PROGRESS NOTES
Physician Progress Note      PATIENT:               Alejandra Matthew  CSN #:                  [id number]  :                       1922  ADMIT DATE:       2021 2:20 PM  [address] [place] DATE:        2021 10:42 AM  RESPONDING  PROVIDER #:        ANA Del Cid MD          QUERY TEXT:    Dear  Dr Patricia Greco      Pt admitted with acute cystitis   secondary  to dove  cath. Pt noted to have   oral  temp  to  100.7   which brings core temp  to 101.2. 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    Clinical Indicators: -pt noted to be on  ABX  prior to culture taken  urine cx  did show candida  no leukocytosis on admit ;  HR  99    with  b/p   95/50  oral  temp  100.7,  core temp them   101.2  blood cx  initially  contamination, repeat no growth    Treatment: -   IV  ABX    Thank you,    Venice Okeefe RN   CCDS  x [phone]  Options provided:  -- Sepsis,  with associated   DALLIN,  present on admission  -- Sepsis,  with associated   DALLIN,  not present on admission  -- Candida Acute cystitis  without  Sepsis  -- Sepsis was ruled out  -- Other - I will add my own diagnosis  -- Disagree - Not applicable / Not valid  -- Disagree - Clinically unable to determine / Unknown  -- Refer to Clinical Documentation Reviewer    PROVIDER RESPONSE TEXT:    This patient has Candida Acute cystitis without Sepsis.     Query created by: Sheldon Lopez on 10/7/2021 9:07 AM      Electronically signed by:  [facility] MD Karen Del Cid MD 10/7/2021 9:46 AM

## 2024-02-29 NOTE — TELEPHONE ENCOUNTER
A venogram was performed on the coronary sinus. Injected with hand injection. Xanax called to [facility] [phone]